# Patient Record
Sex: MALE | Race: WHITE | NOT HISPANIC OR LATINO | Employment: OTHER | URBAN - NONMETROPOLITAN AREA
[De-identification: names, ages, dates, MRNs, and addresses within clinical notes are randomized per-mention and may not be internally consistent; named-entity substitution may affect disease eponyms.]

---

## 2018-05-30 RX ORDER — NITROGLYCERIN 0.4 MG/1
0.4 TABLET SUBLINGUAL
COMMUNITY
End: 2019-09-11 | Stop reason: SDUPTHER

## 2018-05-30 RX ORDER — ATORVASTATIN CALCIUM 80 MG/1
80 TABLET, FILM COATED ORAL DAILY
COMMUNITY
End: 2018-05-31 | Stop reason: SDUPTHER

## 2018-05-30 RX ORDER — DILTIAZEM HYDROCHLORIDE 180 MG/1
180 CAPSULE, COATED, EXTENDED RELEASE ORAL DAILY
COMMUNITY
End: 2019-09-11

## 2018-05-30 RX ORDER — ASPIRIN 81 MG/1
81 TABLET ORAL DAILY
COMMUNITY

## 2018-05-31 ENCOUNTER — OFFICE VISIT (OUTPATIENT)
Dept: CARDIOLOGY | Facility: CLINIC | Age: 73
End: 2018-05-31

## 2018-05-31 VITALS
OXYGEN SATURATION: 97 % | WEIGHT: 184 LBS | DIASTOLIC BLOOD PRESSURE: 40 MMHG | HEART RATE: 75 BPM | SYSTOLIC BLOOD PRESSURE: 90 MMHG

## 2018-05-31 DIAGNOSIS — I25.10 CORONARY ARTERY DISEASE INVOLVING NATIVE CORONARY ARTERY OF NATIVE HEART WITHOUT ANGINA PECTORIS: Primary | ICD-10-CM

## 2018-05-31 DIAGNOSIS — G47.33 OBSTRUCTIVE SLEEP APNEA: ICD-10-CM

## 2018-05-31 DIAGNOSIS — E78.5 DYSLIPIDEMIA: ICD-10-CM

## 2018-05-31 DIAGNOSIS — I10 ESSENTIAL HYPERTENSION: ICD-10-CM

## 2018-05-31 PROCEDURE — 99204 OFFICE O/P NEW MOD 45 MIN: CPT | Performed by: INTERNAL MEDICINE

## 2018-05-31 PROCEDURE — 93000 ELECTROCARDIOGRAM COMPLETE: CPT | Performed by: INTERNAL MEDICINE

## 2018-05-31 RX ORDER — ATORVASTATIN CALCIUM 80 MG/1
80 TABLET, FILM COATED ORAL DAILY
Qty: 90 TABLET | Refills: 11 | Status: SHIPPED | OUTPATIENT
Start: 2018-05-31 | End: 2019-08-26 | Stop reason: SDUPTHER

## 2018-05-31 NOTE — PROGRESS NOTES
Reason for Visit: Establish cardiovascular care.    HPI:  Erasmo Martinez is a 73 y.o. male is being seen for consultation today at the request of Dr. Edwards.  His main complaint recently is fatigue and not having any energy.  Occasionally has some dizziness.   Symptoms has been going on for years and did not improving following CABG.  Also was diagnosed with sleep apnea in March.  He was set up for CPAP.  He uses it about 4-5 hours tonight.  He was told he needed to use the higher pressure setting but was unable to tolerate it.  Has not had any recent heart complaints.  He denies any recent chest pain, palpitations, dizzines, syncope, PND, or orthopnea.     Previous Cardiac Testing and Procedures:  - LHC (10/03/2016) severe subtotal occlusion of mid LAD, 80% proximal diagonal, 40-50% mid LCx, 80% proximal RCA  - Echo (11/13/2017) EF 72%, mild TR and MR, aortic valve sclerosis with trace AI  - Lipid panel (11/13/2017) total cholesterol 143, HDL 40, LDL 86, triglycerides 86  - Nuclear stress (01/09/2018) anterior apical perfusion defect consistent with transmural myocardial infarction with ischemia, lateral perfusion defect consistent with ischemia, EF 70-75%  - Sleep study (03/08/2018) severe obstructive sleep apnea    Patient Active Problem List   Diagnosis   • Obstructive sleep apnea   • Coronary artery disease involving native coronary artery of native heart without angina pectoris   • Dyslipidemia   • Essential hypertension       Social History   Substance Use Topics   • Smoking status: Never Smoker   • Smokeless tobacco: Never Used   • Alcohol use Not on file      Comment: very rare       Family History   Problem Relation Age of Onset   • No Known Problems Mother    • Heart disease Father        The following portions of the patient's history were reviewed and updated as appropriate: allergies, current medications, past family history, past medical history, past social history, past surgical history and problem  list.      Current Outpatient Prescriptions:   •  aspirin 81 MG EC tablet, Take 81 mg by mouth Daily., Disp: , Rfl:   •  atorvastatin (LIPITOR) 80 MG tablet, Take 1 tablet by mouth Daily., Disp: 90 tablet, Rfl: 11  •  diltiaZEM CD (CARDIZEM CD) 180 MG 24 hr capsule, Take 180 mg by mouth Daily., Disp: , Rfl:   •  nitroglycerin (NITROSTAT) 0.4 MG SL tablet, Place 0.4 mg under the tongue Every 5 (Five) Minutes As Needed for Chest Pain. Take no more than 3 doses in 15 minutes., Disp: , Rfl:     Review of Systems   Constitution: Positive for malaise/fatigue. Negative for chills, decreased appetite, fever and weakness.   HENT: Negative for congestion and nosebleeds.    Eyes: Negative for blurred vision and double vision.   Cardiovascular: Positive for near-syncope. Negative for chest pain, irregular heartbeat, leg swelling and palpitations.   Respiratory: Negative for cough and shortness of breath.    Endocrine: Positive for heat intolerance. Negative for cold intolerance.   Hematologic/Lymphatic: Does not bruise/bleed easily.   Skin: Negative for dry skin, itching and rash.   Musculoskeletal: Positive for back pain, joint pain and muscle cramps. Negative for neck pain.   Gastrointestinal: Negative for abdominal pain, constipation, diarrhea, heartburn and melena.   Genitourinary: Positive for nocturia. Negative for dysuria, frequency and hematuria.   Neurological: Positive for dizziness. Negative for headaches, light-headedness and loss of balance.   Psychiatric/Behavioral: Positive for memory loss. Negative for depression. The patient does not have insomnia and is not nervous/anxious.        Objective   BP 90/40 (BP Location: Left arm, Patient Position: Sitting, Cuff Size: Adult)   Pulse 75   Wt 83.5 kg (184 lb)   SpO2 97%   Physical Exam   Constitutional: He is oriented to person, place, and time. He appears well-developed and well-nourished.   HENT:   Head: Normocephalic and atraumatic.   Eyes: Conjunctivae and EOM  are normal. Pupils are equal, round, and reactive to light.   Neck: Normal range of motion. Neck supple. No JVD present. No thyromegaly present.   Cardiovascular: Normal rate, regular rhythm and normal heart sounds.    No murmur heard.  Pulmonary/Chest: Effort normal and breath sounds normal. He has no wheezes. He has no rales.   Abdominal: Soft. Bowel sounds are normal. He exhibits no distension. There is no tenderness.   Musculoskeletal: Normal range of motion. He exhibits no edema.   Neurological: He is alert and oriented to person, place, and time. Coordination normal.   Skin: Skin is warm and dry. No rash noted.   Psychiatric: He has a normal mood and affect. His behavior is normal.       ECG 12 Lead  Date/Time: 5/31/2018 3:46 PM  Performed by: APARNA DAUGHERTY  Authorized by: APARNA DAUGHERTY   Previous ECG: no previous ECG available  Rhythm: sinus rhythm  Rate: normal  Conduction: incomplete RBBB              ICD-10-CM ICD-9-CM   1. Coronary artery disease involving native coronary artery of native heart without angina pectoris I25.10 414.01   2. Dyslipidemia E78.5 272.4   3. Essential hypertension I10 401.9   4. Obstructive sleep apnea G47.33 327.23         Assessment/Plan:  1. Coronary artery disease: History of CABG in 2016.  Will attempt to obtain copy of CABG report.  Has an abnormal stress test from January.  Currently is asymptomatic with reasonable functional capacity.  Continue medical therapy with aspirin, atorvastatin, and diltiazem.    2.  Dyslipidemia: Managed on atorvastatin.  Told by his previous cardiologist needs a lipid panel and liver function test.  Will order along with refill of atorvastatin.    3.  Essential hypertension: Blood pressure is borderline low today.  Encourage him to stay well-hydrated.  If remains low we will consider discontinuation of diltiazem.    4.  Obstructive sleep apnea: Recent sleep study from March demonstrates severe obstructive sleep apnea.  Tolerating minimal  settings on CPAP for part of the night.  Encourage him to use it as much as possible at the maximally tolerated settings.  Inadequately treated sleep apnea may be contributing to his chronic fatigue.

## 2018-07-06 ENCOUNTER — TELEPHONE (OUTPATIENT)
Dept: CARDIOLOGY | Facility: CLINIC | Age: 73
End: 2018-07-06

## 2018-07-06 NOTE — TELEPHONE ENCOUNTER
Please let him know that his hepatic function panel is within normal limits and his lipid panel demonstrates good control.

## 2018-07-06 NOTE — TELEPHONE ENCOUNTER
Tried calling patient's wife back at the number she requested, no answer. Left message for her to return my call for results.

## 2018-07-06 NOTE — TELEPHONE ENCOUNTER
PATIENT'S WIFE CALLED REQUESTING LAB RESULTS. DOES NOT LOOK LIKE RESULTS WERE ROUTED TO YOU TO REVIEW. WILL YOU PLEASE REVIEW HEPATIC/LIPID RESULTS?    THANKS

## 2018-08-14 ENCOUNTER — OFFICE VISIT (OUTPATIENT)
Dept: NEUROLOGY | Age: 73
End: 2018-08-14
Payer: MEDICARE

## 2018-08-14 ENCOUNTER — TELEPHONE (OUTPATIENT)
Dept: NEUROLOGY | Age: 73
End: 2018-08-14

## 2018-08-14 VITALS
DIASTOLIC BLOOD PRESSURE: 67 MMHG | SYSTOLIC BLOOD PRESSURE: 110 MMHG | HEIGHT: 67 IN | WEIGHT: 182.4 LBS | HEART RATE: 67 BPM | BODY MASS INDEX: 28.63 KG/M2

## 2018-08-14 DIAGNOSIS — R41.3 MEMORY LOSS: ICD-10-CM

## 2018-08-14 DIAGNOSIS — G45.4 TRANSIENT GLOBAL AMNESIA: ICD-10-CM

## 2018-08-14 DIAGNOSIS — G47.52 REM SLEEP BEHAVIOR DISORDER: ICD-10-CM

## 2018-08-14 DIAGNOSIS — G47.33 SLEEP APNEA, OBSTRUCTIVE: Primary | ICD-10-CM

## 2018-08-14 LAB
FOLATE: 12.8 NG/ML (ref 4.5–32.2)
TSH SERPL DL<=0.05 MIU/L-ACNC: 2.15 UIU/ML (ref 0.27–4.2)
VITAMIN B-12: 508 PG/ML (ref 211–946)

## 2018-08-14 PROCEDURE — 99204 OFFICE O/P NEW MOD 45 MIN: CPT | Performed by: PSYCHIATRY & NEUROLOGY

## 2018-08-14 RX ORDER — NITROGLYCERIN 0.4 MG/1
0.4 TABLET SUBLINGUAL PRN
COMMUNITY
End: 2020-01-07 | Stop reason: SDUPTHER

## 2018-08-14 RX ORDER — ATORVASTATIN CALCIUM 80 MG/1
80 TABLET, FILM COATED ORAL NIGHTLY
COMMUNITY
Start: 2018-05-31 | End: 2018-08-14

## 2018-08-14 RX ORDER — MEMANTINE HYDROCHLORIDE 10 MG/1
10 TABLET ORAL DAILY
Qty: 60 TABLET | Refills: 5 | Status: SHIPPED | OUTPATIENT
Start: 2018-08-14 | End: 2019-06-24 | Stop reason: SDUPTHER

## 2018-08-14 RX ORDER — ATORVASTATIN CALCIUM 80 MG/1
80 TABLET, FILM COATED ORAL NIGHTLY
Refills: 11 | COMMUNITY
Start: 2018-07-19 | End: 2018-08-14

## 2018-08-14 RX ORDER — MEMANTINE HYDROCHLORIDE 5 MG/1
5 TABLET ORAL DAILY
Qty: 60 TABLET | Refills: 0 | Status: SHIPPED | OUTPATIENT
Start: 2018-08-14 | End: 2019-06-24

## 2018-08-14 RX ORDER — LATANOPROST 50 UG/ML
1 SOLUTION/ DROPS OPHTHALMIC NIGHTLY
Refills: 6 | COMMUNITY
Start: 2018-07-19

## 2018-08-14 RX ORDER — ASPIRIN 81 MG/1
81 TABLET ORAL NIGHTLY
COMMUNITY

## 2018-08-14 RX ORDER — DILTIAZEM HYDROCHLORIDE 180 MG/1
180 CAPSULE, COATED, EXTENDED RELEASE ORAL NIGHTLY
COMMUNITY
End: 2020-01-07

## 2018-08-14 RX ORDER — CLONAZEPAM 0.5 MG/1
0.5 TABLET ORAL NIGHTLY
Refills: 0 | COMMUNITY
Start: 2018-07-26 | End: 2020-01-07

## 2018-08-14 ASSESSMENT — ENCOUNTER SYMPTOMS
VOMITING: 0
CONSTIPATION: 1
BLOOD IN STOOL: 0
COUGH: 0
HEMOPTYSIS: 0
ABDOMINAL PAIN: 0
DOUBLE VISION: 0
SHORTNESS OF BREATH: 0
BACK PAIN: 0
SPUTUM PRODUCTION: 0
BLURRED VISION: 0
NAUSEA: 0
DIARRHEA: 0

## 2018-08-14 NOTE — LETTER
[facility]  [address] [address]  [place], [address]  Phone [phone]  Fax [phone]     Patient Name: Ivory Haile 2018  : 1945  Age: 75 y.o.   Patient Address: [address] [place] [address] [address] [address]       Patient Phone: [phone] (home)     REFERRAL  Referred to: DME provider of patient's choice  Ivory Haile is referred for the following:        Replinishible PAP Supplies, 1 year supply  Item HPCPS Code Frequency   Mask of choice  or  1 per 3 months   Nasal Mask cushion/pillows  or  2 per 30 days   Full Face Mask Interface  1 per 30 days   Headgear  1 per 6 months   Tubing, length of choice  or  1 per 3 months   Water Chamber  1 per 6 months   Chinstrap  1 per 6 months   Disposable Filters  2 per 30 days   Reusable Filters  1 per 6 months     Diagnoses:  Obstructive sleep apnea (G47.33)  Length of Need: Lifetime, 99    Ordering Provider: STAN Reyes Kasia: [id number]         Signature:       Date: 2018      Electronically Signed by Ciera Lopez M.D.

## 2018-08-14 NOTE — PROGRESS NOTES
[facility]  [address] [place] [address] [address]  [address]  Phone [phone]  Fax [phone]     Giovani Alegria PATIENT VISIT        Patient: Velasquez yCr  :  1945  Age:  75 y.o. MRN:  [id number]  Account #:  [de-identified]  Today:  18    Referring Provider:  Natalee Baker M.D. Consulting Provider: Mary Sanabria M.D.    [address] [place] [address]:  Chief Complaint   Patient presents with   Orlene Gala New Patient     feels tired all the time       History Source: History obtained from chart review and the patient. PCP: Natalee Baker    HISTORY OF PRESENT ILLNESS:   Velasquez Cyr is a 75y.o. year old man with a history of obstructive sleep apnea who was referred for continued care. He had a sleep study in [place] 3/2018 that showed ANGE with an AHI of 47. He was prescribed CPAP at 8cm. He is having problems using the device. He uses a full face mask and it makes noises. He ends up taking the mask off. He sleeps OK but is fatigued in the day. He gets to sleep quickly. He has active sleep and fights, hits, and kicks in his sleep. He has had this since about . 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 He has had several episodes of transient amnesia and has had worsening memory loss. He did see a neurologist in [place] and had a negative work up his wife says. He has had worsening memory for many years apparently. He and his wife have been  for 6 years and she relates that she founds letters or a diary he wrote 10 years ago complaining of memory loss.     PAST MEDICAL HITORY:    Past Medical History:   Diagnosis Date    Arthritis     Cardiovascular disease     Glaucoma     pre glaucoma    Hyperlipidemia     Hypertension     Memory disorder     Obstructive sleep apnea     Polycythemia        Past Surgical History:   Procedure Laterality Date    CORONARY ARTERY BYPASS GRAFT      HERNIA REPAIR      HIP ARTHROPLASTY Right        Recent

## 2018-08-14 NOTE — TELEPHONE ENCOUNTER
Patients wife called and wanted to know if the patient needed to atorvastatin, it was discounted and on the paperwork that the patient left the office with to quit taking it. I asked [facility], she stated that it was on there twice and met to take one off not both. I called the patients wife back and left a message that the patient is suppose to be taking that, it was our fault that since there was two listed. That patient needs to continue that medication and start the nameda.

## 2018-08-16 ENCOUNTER — TELEPHONE (OUTPATIENT)
Dept: NEUROLOGY | Age: 73
End: 2018-08-16

## 2018-09-07 ENCOUNTER — HOSPITAL ENCOUNTER (OUTPATIENT)
Dept: NEUROLOGY | Age: 73
Discharge: HOME | End: 2018-09-07
Payer: MEDICARE

## 2018-09-07 ENCOUNTER — HOSPITAL ENCOUNTER (OUTPATIENT)
Dept: MRI IMAGING | Age: 73
Discharge: HOME | End: 2018-09-07
Payer: MEDICARE

## 2018-09-07 DIAGNOSIS — G45.4 TRANSIENT GLOBAL AMNESIA: ICD-10-CM

## 2018-09-07 DIAGNOSIS — R41.3 MEMORY LOSS: ICD-10-CM

## 2018-09-07 PROCEDURE — 70551 MRI BRAIN STEM W/O DYE: CPT

## 2018-09-07 PROCEDURE — 95819 EEG AWAKE AND ASLEEP: CPT | Performed by: PSYCHIATRY & NEUROLOGY

## 2018-09-07 PROCEDURE — 95812 EEG 41-60 MINUTES: CPT

## 2018-09-10 ENCOUNTER — TELEPHONE (OUTPATIENT)
Dept: NEUROLOGY | Age: 73
End: 2018-09-10

## 2018-09-26 ENCOUNTER — TELEPHONE (OUTPATIENT)
Dept: NEUROLOGY | Age: 73
End: 2018-09-26

## 2018-11-12 ENCOUNTER — OFFICE VISIT (OUTPATIENT)
Dept: NEUROLOGY | Age: 73
End: 2018-11-12
Payer: MEDICARE

## 2018-11-12 VITALS
SYSTOLIC BLOOD PRESSURE: 100 MMHG | WEIGHT: 192.2 LBS | BODY MASS INDEX: 29.13 KG/M2 | RESPIRATION RATE: 18 BRPM | HEART RATE: 60 BPM | HEIGHT: 68 IN | DIASTOLIC BLOOD PRESSURE: 62 MMHG

## 2018-11-12 DIAGNOSIS — G47.33 SLEEP APNEA, OBSTRUCTIVE: ICD-10-CM

## 2018-11-12 DIAGNOSIS — G47.52 REM SLEEP BEHAVIOR DISORDER: ICD-10-CM

## 2018-11-12 DIAGNOSIS — G45.4 TRANSIENT GLOBAL AMNESIA: ICD-10-CM

## 2018-11-12 DIAGNOSIS — R41.3 MEMORY LOSS: Primary | ICD-10-CM

## 2018-11-12 PROCEDURE — G8484 FLU IMMUNIZE NO ADMIN: HCPCS | Performed by: PSYCHIATRY & NEUROLOGY

## 2018-11-12 PROCEDURE — G8427 DOCREV CUR MEDS BY ELIG CLIN: HCPCS | Performed by: PSYCHIATRY & NEUROLOGY

## 2018-11-12 PROCEDURE — 1036F TOBACCO NON-USER: CPT | Performed by: PSYCHIATRY & NEUROLOGY

## 2018-11-12 PROCEDURE — 1123F ACP DISCUSS/DSCN MKR DOCD: CPT | Performed by: PSYCHIATRY & NEUROLOGY

## 2018-11-12 PROCEDURE — 4040F PNEUMOC VAC/ADMIN/RCVD: CPT | Performed by: PSYCHIATRY & NEUROLOGY

## 2018-11-12 PROCEDURE — G8419 CALC BMI OUT NRM PARAM NOF/U: HCPCS | Performed by: PSYCHIATRY & NEUROLOGY

## 2018-11-12 PROCEDURE — 3017F COLORECTAL CA SCREEN DOC REV: CPT | Performed by: PSYCHIATRY & NEUROLOGY

## 2018-11-12 PROCEDURE — 1101F PT FALLS ASSESS-DOCD LE1/YR: CPT | Performed by: PSYCHIATRY & NEUROLOGY

## 2018-11-12 PROCEDURE — 99213 OFFICE O/P EST LOW 20 MIN: CPT | Performed by: PSYCHIATRY & NEUROLOGY

## 2018-11-12 RX ORDER — ATORVASTATIN CALCIUM 80 MG/1
80 TABLET, FILM COATED ORAL
COMMUNITY
Start: 2018-05-31 | End: 2020-01-07

## 2018-11-12 NOTE — PROGRESS NOTES
3  Heart[de-identified]  regular rate and rhythm, S1, S2 normal, no murmur, click, rub or gallop  Lungs:  clear to auscultation bilaterally  Extremities:  extremities normal, atraumatic, no cyanosis or edema  Neurologic:  Extraocular movements are intact without nystagmus. He has reduced eye blink rate. Visual fields are full to confrontation. Facial movements are symmetrical and mildly hypomymic. Speech is precise. Extremity strength is normal in both uppers and lowers. Deep tendon reflexes are intact and symmetrical.  Rapid alternating movements are unimpaired. Finger-to-nose testing is performed well, without dysmetria. Gait is normal.    Pertinent Diagnostic Studies:  Narrative   MRI BRAIN WO CONTRAST 9/7/2018 8:37 AM   HISTORY: R41.3   Comparison: None.     Technique: Multiplanar imaging of the brain was performed in a routine   fashion without intravenous contrast.   Findings: There is no diffusion signal abnormality to suggest acute infarct. There appears to be mild generalized white matter volume loss in the   cerebral hemispheres. There does not appear to be significant small   vessel ischemic change or evidence of old cortical infarct. No lacunar   infarcts identified. Bilateral symmetric T2 hyperintensity in the   globus pallidus (series 5-image 13). No other areas of signal   abnormality identified. There is no intra-axial or extra-axial   hemorrhage. No visualized mass lesion on this noncontrast exam. Normal   size and configuration of the ventricular system for patient age. The   posterior fossa structures are unremarkable. There does not appear to   be obvious volume loss in the brainstem or cerebellar hemispheres. Incidentally noted small hippocampal cysts. The pituitary gland and   sella are unremarkable. The major intracranial flow voids are   preserved. The orbits are unremarkable. The paranasal sinuses and mastoids are   clear.  Marrow signal in the calvarium and skull base appears normal.

## 2019-06-24 ENCOUNTER — OFFICE VISIT (OUTPATIENT)
Dept: NEUROLOGY | Age: 74
End: 2019-06-24
Payer: MEDICARE

## 2019-06-24 VITALS
DIASTOLIC BLOOD PRESSURE: 64 MMHG | WEIGHT: 194 LBS | HEART RATE: 64 BPM | HEIGHT: 67 IN | BODY MASS INDEX: 30.45 KG/M2 | SYSTOLIC BLOOD PRESSURE: 108 MMHG | OXYGEN SATURATION: 96 %

## 2019-06-24 DIAGNOSIS — G47.52 REM SLEEP BEHAVIOR DISORDER: ICD-10-CM

## 2019-06-24 DIAGNOSIS — G45.4 TRANSIENT GLOBAL AMNESIA: ICD-10-CM

## 2019-06-24 DIAGNOSIS — R41.3 MEMORY LOSS: ICD-10-CM

## 2019-06-24 DIAGNOSIS — R53.83 FATIGUE, UNSPECIFIED TYPE: ICD-10-CM

## 2019-06-24 DIAGNOSIS — G47.33 OBSTRUCTIVE SLEEP APNEA: Primary | ICD-10-CM

## 2019-06-24 DIAGNOSIS — Z99.89 CPAP (CONTINUOUS POSITIVE AIRWAY PRESSURE) DEPENDENCE: ICD-10-CM

## 2019-06-24 PROCEDURE — 1036F TOBACCO NON-USER: CPT | Performed by: PHYSICIAN ASSISTANT

## 2019-06-24 PROCEDURE — G8427 DOCREV CUR MEDS BY ELIG CLIN: HCPCS | Performed by: PHYSICIAN ASSISTANT

## 2019-06-24 PROCEDURE — 4040F PNEUMOC VAC/ADMIN/RCVD: CPT | Performed by: PHYSICIAN ASSISTANT

## 2019-06-24 PROCEDURE — 3017F COLORECTAL CA SCREEN DOC REV: CPT | Performed by: PHYSICIAN ASSISTANT

## 2019-06-24 PROCEDURE — 99214 OFFICE O/P EST MOD 30 MIN: CPT | Performed by: PHYSICIAN ASSISTANT

## 2019-06-24 PROCEDURE — 1123F ACP DISCUSS/DSCN MKR DOCD: CPT | Performed by: PHYSICIAN ASSISTANT

## 2019-06-24 PROCEDURE — G8417 CALC BMI ABV UP PARAM F/U: HCPCS | Performed by: PHYSICIAN ASSISTANT

## 2019-06-24 RX ORDER — MEMANTINE HYDROCHLORIDE 10 MG/1
10 TABLET ORAL 2 TIMES DAILY
Qty: 60 TABLET | Refills: 3 | Status: SHIPPED | OUTPATIENT
Start: 2019-06-24 | End: 2020-07-21

## 2019-06-24 NOTE — PATIENT INSTRUCTIONS
Instructions:  1. Call our office in about 1 week to see if I received the lab and MRI report  2. 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 Increase memantine 10 mg to twice a day  4. Please obtain a compliance report   5. I ordered a repeat sleep study, the sleep lab will mail you a packet and call you with an appointment      Patient education: Sleep apnea in adults       INTRODUCTION -- Normally during sleep, air moves through the throat and in and out of the lungs at a regular rhythm. In a person with sleep apnea, air movement is periodically diminished or stopped. There are two types of sleep apnea: obstructive sleep apnea and central sleep apnea. In obstructive sleep apnea, breathing is abnormal because of narrowing or closure of the throat. In central sleep apnea, breathing is abnormal because of a change in the breathing control and rhythm. Sleep apnea is a serious condition that can affect a person's ability to safely perform normal daily activities and can affect long term health. Approximately 25 percent of adults are at risk for sleep apnea of some degree. Men are more commonly affected than women. Other risk factors include middle and older age, being overweight or obese, and having a small mouth and throat. This topic review focuses on the most common type of sleep apnea in adults, obstructive sleep apnea (AGNE). HOW SLEEP APNEA OCCURS -- The throat is surrounded by muscles that control the airway for speaking, swallowing, and breathing. During sleep, these muscles are less active, and this causes the throat to narrow. In most people, this narrowing does not affect breathing. In others, it can cause snoring, sometimes with reduced or completely blocked airflow. A completely blocked airway without airflow is called an obstructive apnea. Partial obstruction with diminished airflow is called a hypopnea.  A person may have apnea and hypopnea during sleep. Insufficient breathing due to apnea or hypopnea causes oxygen levels to fall and carbon dioxide to rise. Because the airway is blocked, breathing faster or harder does not help to improve oxygen levels until the airway is reopened. Typically, the obstruction requires the person to awaken to activate the upper airway muscles. Once the airway is opened, the person then takes several deep breaths to catch up on breathing. As the person awakens, he or she may move briefly, snort or snore, and take a deep breath. Less frequently, a person may awaken completely with a sensation of gasping, smothering, or choking. If the person falls back to sleep quickly, he or she will not remember the event. Many people with sleep apnea are unaware of their abnormal breathing in sleep, and all patients underestimate how often their sleep is interrupted. Awakening from sleep causes sleep to be unrefreshing and causes fatigue and daytime sleepiness. Anatomic causes of obstructive sleep apnea --  Most patients have ANGE because of a small upper airway. As the bones of the face and skull develop, some people develop a small lower face, a small mouth, and a tongue that seems too large for the mouth. These features are genetically determined, which explains why ANGE tends to cluster in families. Obesity is another major factor. Tonsil enlargement can be an important cause, especially in children. SLEEP APNEA SYMPTOMS -- The main symptoms of ANGE are loud snoring, fatigue, and daytime sleepiness. However, some people have no symptoms. For example, if the person does not have a bed partner, he or she may not be aware of the snoring. Fatigue and sleepiness have many causes and are often attributed to overwork and increasing age. As a result, a person may be slow to recognize that they have a problem. A bed partner or spouse often prompts the patient to seek medical care.   Other symptoms may include one or more of the following:  ?Restless sleep  ? Awakening with choking, gasping, or smothering  ? Morning headaches, dry mouth, or sore throat  ? Waking frequently to urinate  ? Awakening unrested, groggy  ? Low energy, difficulty concentrating, memory impairment    Risk factors -- Certain factors increase the risk of sleep apnea. ?Increasing age - ANGE occurs at all ages, but it is more common in middle and older age adults. ?Male sex - ANGE is two times more common in men, especially in middle age. ?Obesity - The more obese a person is, the more likely he or she is to have ANGE. ? Sedation from medication or alcohol - This interferes with the ability to awaken from sleep and can lengthen periods of apnea (no breathing), with potentially dangerous consequences. ? Abnormality of the airway. SLEEP APNEA CONSEQUENCES -- Complications of sleep apnea can include daytime sleepiness and difficulty concentrating. The consequence of this is an increased risk of accidents and errors in daily activities. Studies have shown that people with severe ANGE are more than twice as likely to be involved in a motor vehicle accident as people without these conditions. People with ANGE are encouraged to discuss options for driving, working, and performing other high-risk tasks with a healthcare provider. In addition, people with untreated ANGE may have an increased risk of cardiovascular problems such as high blood pressure, heart attack, abnormal heart rhythms, or stroke. This risk may be due to changes in the heart rate and blood pressure that occur during sleep. SLEEP APNEA DIAGNOSIS -- The diagnosis of ANGE is best made by a knowledgeable sleep medicine specialist who has an understanding of the individual's health issues. The diagnosis is usually based upon the person's medical history, physical examination, and testing, including:  ? A complaint of snoring and ineffective sleep  ? Neck size (greater than 16 inches in men or 14 inches in women) is CPAP should be used any time the person sleeps (day or night). The CPAP device is usually used for the first time in the sleep lab, where a technician can adjust the pressure and select the best equipment to keep the airway open. Alternatively, an auto device with a self-adjusting pressure feature, provided with proper education and training, can get treatment started without another sleep test. While the treatment may seem uncomfortable, noisy, or bulky at first, most people accept the treatment after experiencing better sleep. However, difficulty with mask comfort and nasal congestion prevent up to 50 percent of people from using the treatment on a regular basis. Continued follow up with a healthcare provider helps to ensure that the treatment is effective and comfortable. Information from the CPAP machine is often used by physicians, therapists, and insurers to track the success of treatment. CPAP can be delivered with different features to improve comfort and solve problems that may come up during treatment. Changes in treatment may be needed if symptoms do not improve or if the persons condition changes, such as a gain or loss of weight. Adjust sleep position -- Adjusting sleep position (to stay off the back) may help improve sleep quality in people who have ANGE when sleeping on the back. However, this is difficult to maintain throughout the night and is rarely an adequate solution. Weight loss -- Weight loss may be helpful for obese or overweight patients. Weight loss may be accomplished with dietary changes, exercise, and/or surgical treatment. However, it can be difficult to maintain weight loss; the five-year success of non-surgical weight loss is only 5 percent, meaning that 95 percent of people regain lost weight. Avoid alcohol and other sedatives -- Alcohol can worsen sleepiness, potentially increasing the risk of accidents or injury.  People with ANGE are often counseled to drink little to no alcohol, even during the daytime. Similarly, people who take anti-anxiety medications or sedatives to sleep should speak with their healthcare provider about the safety of these medications. People with ANGE must notify all healthcare providers, including surgeons, about their condition and the potential risks of being sedated. People with ANGE who are given anesthesia and/or pain medications require special management and close monitoring to reduce the risk of a blocked airway. Dental devices -- A dental device, called an oral appliance or mandibular advancement device, can reposition the jaw (mandible), bringing the tongue and soft palate forward as well. This may relieve obstruction in some people. This treatment is excellent for reducing snoring, although the effect on ANGE is sometimes more limited. As a result, dental devices are best used for mild cases of ANGE when relief of snoring is the main goal. Failure to tolerate and accept CPAP is another indication for dental devices. While dental devices are not as effective as CPAP for ANGE, some patients prefer a dental device to CPAP. Side effects of dental devices are generally minor but may include changes to the bite with prolonged use. Surgical treatment -- Surgery is an alternative therapy for patients who cannot tolerate or do not improve with nonsurgical treatments such as CPAP or oral devices. Surgery can also be used in combination with other nonsurgical treatments. Surgical procedures reshape structures in the upper airways or surgically reposition bone or soft tissue. Uvulopalatopharyngoplasty (UPPP) removes the uvula and excessive tissue in the throat, including the tonsils, if present. Other procedures, such as maxillomandibular advancement (MMA), address both the upper and lower pharyngeal airway more globally. UPPP alone has limited success rates (less than 50 percent) and people can relapse (when ANGE symptoms return after surgery).  As a result, this surgery is only recommended in a minority of people and should be considered with caution. MMA may have a higher success rate, particularly in people with abnormal jaw (maxilla and mandible) anatomy, but it is the most complicated procedure. A newer surgical approach, nerve stimulation to protrude the tongue, has promising success rates in very selected people. Tracheostomy creates a permanent opening in the neck. It is reserved for people with severe disease in whom less drastic measures have failed or are inappropriate. Although it is always successful in eliminating obstructive sleep apnea, tracheostomy requires significant lifestyle changes and carries some serious risks (eg, infection, bleeding, blockage). All surgical treatments require discussions about the goals of treatment, the expected outcomes, and potential complications. Hypoglossal nerve stimulator- \"Inspire\" device    WHERE TO GET MORE INFORMATION -- Your healthcare provider is the best source of information for questions and concerns related to your medical problem. Organizations  American Sleep Apnea Association  Provides information about sleep apnea to the public, publishes a newsletter, and serves as an advocate for people with the disorder. [place], [address]   [email or web address] org   [employer].. org   Tel: [phone]   Fax: [facility] organization that works to [employer] and safety by promoting public understanding of sleep and sleep disorders. Supports sleep-related education, research, and advocacy; produces and distributes educational materials to the public and healthcare professionals; and offers postdoctoral fellowships and grants for sleep researchers. Marino [email or web address]. org   [email or web address]. org   Tel: [phone]   Fax: [phone]    Important information:  Medicare/private insurance CPAP/BiPAP/APAP requirements:  Medicare/private insurance has specific requirements for PAP compliance that must be met during the first 90 days of use to continue coverage for CPAP/BiPAP/APAP  from day 91 and beyond. The policy requires that patients use a PAP device 4 hours per 24 hour period, at least 70% of the time over a 30 day period. This data must be downloaded as a report direct from the PAP devices. This is called a compliance download. Your PAP supplier will assist you in this matter. Reminder:  Please bring a copy of the compliance download to your next office visit or have your supplier fax it to our office prior to your office visit. Note:  Where applicable, we will utilize PAP device efficiency reports, additional testing, and face-to-face  clinical evaluation subsequent to any treatment, changes in treatment, and continued treatment. Caution:  Please abstain from driving or engaging in other activities which may be hazardous in the presence of diminished alertness or daytime drowsiness. And avoid the use of sedatives or alcohol, which can worsen sleep apnea and daytime drowsiness. Mask suggestions:  - Resmed Airfit N20 (Nasal) or F20 (Full face mask). They conform to your face, thus decreasing the potential for mask leakage. You might like the [employer] (full face mask). It has a \"memory foam\" like cushion. The AirFit F30 is a smaller style full face mask designed to sit low on and cover less of your face for fewer facial marks. Respironics: You might also like to try a nasal mask called a Dreamwear nasal mask or the Dreamwear nasal pillow. Another suggestion is the [facility], it is a minimal contact full face mask. The Christine Lovings incredible under the nose design makes it the only full face mask that won't cause red marks on the bridge of your nose when compared to other full face masks.  The Dreamwear
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
## 2019-08-26 RX ORDER — ATORVASTATIN CALCIUM 80 MG/1
80 TABLET, FILM COATED ORAL DAILY
Qty: 90 TABLET | Refills: 0 | Status: SHIPPED | OUTPATIENT
Start: 2019-08-26 | End: 2019-09-11 | Stop reason: SDUPTHER

## 2019-09-11 ENCOUNTER — OFFICE VISIT (OUTPATIENT)
Dept: CARDIOLOGY | Facility: CLINIC | Age: 74
End: 2019-09-11

## 2019-09-11 VITALS
BODY MASS INDEX: 30.13 KG/M2 | SYSTOLIC BLOOD PRESSURE: 106 MMHG | OXYGEN SATURATION: 94 % | DIASTOLIC BLOOD PRESSURE: 60 MMHG | WEIGHT: 192 LBS | HEART RATE: 69 BPM | HEIGHT: 67 IN

## 2019-09-11 DIAGNOSIS — G47.33 OBSTRUCTIVE SLEEP APNEA: ICD-10-CM

## 2019-09-11 DIAGNOSIS — E78.5 DYSLIPIDEMIA: ICD-10-CM

## 2019-09-11 DIAGNOSIS — I10 ESSENTIAL HYPERTENSION: ICD-10-CM

## 2019-09-11 DIAGNOSIS — R53.83 FATIGUE, UNSPECIFIED TYPE: ICD-10-CM

## 2019-09-11 DIAGNOSIS — I25.10 CORONARY ARTERY DISEASE INVOLVING NATIVE CORONARY ARTERY OF NATIVE HEART WITHOUT ANGINA PECTORIS: Primary | ICD-10-CM

## 2019-09-11 PROCEDURE — 99214 OFFICE O/P EST MOD 30 MIN: CPT | Performed by: INTERNAL MEDICINE

## 2019-09-11 RX ORDER — LATANOPROST 50 UG/ML
1 SOLUTION/ DROPS OPHTHALMIC NIGHTLY
COMMUNITY

## 2019-09-11 RX ORDER — MEMANTINE HYDROCHLORIDE 10 MG/1
TABLET ORAL
COMMUNITY
Start: 2019-06-24

## 2019-09-11 RX ORDER — ATORVASTATIN CALCIUM 20 MG/1
20 TABLET, FILM COATED ORAL DAILY
Qty: 30 TABLET | Refills: 11 | Status: SHIPPED | OUTPATIENT
Start: 2019-09-11

## 2019-09-11 RX ORDER — NITROGLYCERIN 0.4 MG/1
0.4 TABLET SUBLINGUAL
Qty: 25 TABLET | Refills: 11 | Status: SHIPPED | OUTPATIENT
Start: 2019-09-11

## 2019-09-11 RX ORDER — PHENOL 1.4 %
AEROSOL, SPRAY (ML) MUCOUS MEMBRANE
COMMUNITY

## 2019-09-11 NOTE — PROGRESS NOTES
Reason for Visit: cardiovascular follow up.    HPI:  Erasmo Martinez is a 74 y.o. male is here today for follow-up.  He is doing well from a cardiac standpoint.  His main complaint is significant fatigue.  He feels tired much of the time.  Does not have any energy.  He was started on CPAP but family notes only minimal improvement.  He is also being worked up for memory loss.  He complains of muscle cramps.  He is previously started on diltiazem for his coronary artery disease by her prior cardiologist.  Family denies ever having blood pressure problems.  He is dizzy when he leans over.  He does not get much exercise.    Previous Cardiac Testing and Procedures:  - LHC (10/03/2016) severe subtotal occlusion of mid LAD, 80% proximal diagonal, 40-50% mid LCx, 80% proximal RCA  - Echo (11/13/2017) EF 72%, mild TR and MR, aortic valve sclerosis with trace AI  - Lipid panel (11/13/2017) total cholesterol 143, HDL 40, LDL 86, triglycerides 86  - Nuclear stress (01/09/2018) anterior apical perfusion defect consistent with transmural myocardial infarction with ischemia, lateral perfusion defect consistent with ischemia, EF 70-75%  - Sleep study (03/08/2018) severe obstructive sleep apnea  -Lipid panel (12/28/2018) total cholesterol 102, HDL 37, LDL 40, triglycerides 125    Patient Active Problem List   Diagnosis   • Obstructive sleep apnea   • Coronary artery disease involving native coronary artery of native heart without angina pectoris   • Dyslipidemia   • Essential hypertension       Social History     Tobacco Use   • Smoking status: Never Smoker   • Smokeless tobacco: Never Used   Substance Use Topics   • Alcohol use: Not on file     Comment: very rare   • Drug use: No       Family History   Problem Relation Age of Onset   • No Known Problems Mother    • Heart disease Father        The following portions of the patient's history were reviewed and updated as appropriate: allergies, current medications, past family  "history, past medical history, past social history, past surgical history and problem list.      Current Outpatient Medications:   •  aspirin 81 MG EC tablet, Take 81 mg by mouth Daily., Disp: , Rfl:   •  atorvastatin (LIPITOR) 20 MG tablet, Take 1 tablet by mouth Daily., Disp: 30 tablet, Rfl: 11  •  latanoprost (XALATAN) 0.005 % ophthalmic solution, 1 drop Every Night., Disp: , Rfl:   •  Melatonin 10 MG tablet, Take  by mouth., Disp: , Rfl:   •  memantine (NAMENDA) 10 MG tablet, memantine 10 mg tablet  Take 1 tablet every day by oral route., Disp: , Rfl:   •  nitroglycerin (NITROSTAT) 0.4 MG SL tablet, Place 1 tablet under the tongue Every 5 (Five) Minutes As Needed for Chest Pain. Take no more than 3 doses in 15 minutes., Disp: 25 tablet, Rfl: 11    Review of Systems   Constitution: Positive for weakness and malaise/fatigue. Negative for chills and fever.   Cardiovascular: Negative for chest pain and paroxysmal nocturnal dyspnea.   Respiratory: Negative for cough and shortness of breath.    Skin: Negative for rash.   Musculoskeletal: Positive for muscle cramps.   Gastrointestinal: Negative for abdominal pain and heartburn.   Neurological: Positive for dizziness. Negative for numbness.   Psychiatric/Behavioral: Positive for memory loss.       Objective   /60 (BP Location: Left arm, Patient Position: Sitting, Cuff Size: Adult)   Pulse 69   Ht 170.2 cm (67\")   Wt 87.1 kg (192 lb)   SpO2 94%   BMI 30.07 kg/m²   Physical Exam   Constitutional: He is oriented to person, place, and time. He appears well-developed and well-nourished.   HENT:   Head: Normocephalic and atraumatic.   Cardiovascular: Normal rate, regular rhythm and normal heart sounds.   No murmur heard.  Pulmonary/Chest: Effort normal and breath sounds normal.   Musculoskeletal: He exhibits no edema.   Neurological: He is alert and oriented to person, place, and time.   Skin: Skin is warm and dry.   Psychiatric: He has a normal mood and affect. "     Procedures      ICD-10-CM ICD-9-CM   1. Coronary artery disease involving native coronary artery of native heart without angina pectoris I25.10 414.01   2. Dyslipidemia E78.5 272.4   3. Essential hypertension I10 401.9   4. Obstructive sleep apnea G47.33 327.23   5. Fatigue, unspecified type R53.83 780.79         Assessment/Plan:  1. Coronary artery disease: History of CABG in 2016.    Results remain unavailable at this time.    Previous abnormal nuclear stress from 1/2018 has been managed medically due to lack of any chest pain or anginal symptoms. Continue aspirin.  Decrease atorvastatin to 20 mg.  Discontinue diltiazem.     2.  Dyslipidemia: Controlled on atorvastatin.  Given muscle aches and fatigue will decrease atorvastatin down to 20 mg.     3.  Essential hypertension: Blood pressure is borderline low today.  Given his fatigue will discontinue diltiazem.     4.  Obstructive sleep apnea: Patient is compliant with CPAP and uses it regularly.  Still has significant fatigue.    5.  Fatigue: Unclear etiology.  Diltiazem could potentially be contributing.  Will discontinue.  Also has muscle aches with atorvastatin.  Will decrease from 80 mg down to 20 mg.

## 2019-09-25 ENCOUNTER — HOSPITAL ENCOUNTER (OUTPATIENT)
Dept: SLEEP CENTER | Age: 74
Discharge: HOME | End: 2019-09-27
Payer: MEDICARE

## 2019-09-25 PROCEDURE — 95811 POLYSOM 6/>YRS CPAP 4/> PARM: CPT

## 2019-09-25 PROCEDURE — 95811 POLYSOM 6/>YRS CPAP 4/> PARM: CPT | Performed by: PSYCHIATRY & NEUROLOGY

## 2019-10-01 ENCOUNTER — OFFICE VISIT (OUTPATIENT)
Dept: NEUROLOGY | Age: 74
End: 2019-10-01
Payer: MEDICARE

## 2019-10-01 VITALS
WEIGHT: 190 LBS | HEART RATE: 73 BPM | BODY MASS INDEX: 29.82 KG/M2 | DIASTOLIC BLOOD PRESSURE: 80 MMHG | SYSTOLIC BLOOD PRESSURE: 120 MMHG | HEIGHT: 67 IN

## 2019-10-01 DIAGNOSIS — E55.9 VITAMIN D DEFICIENCY, UNSPECIFIED: ICD-10-CM

## 2019-10-01 DIAGNOSIS — R41.3 MEMORY LOSS: ICD-10-CM

## 2019-10-01 DIAGNOSIS — G47.10 HYPERSOMNOLENCE: ICD-10-CM

## 2019-10-01 DIAGNOSIS — G47.52 REM SLEEP BEHAVIOR DISORDER: ICD-10-CM

## 2019-10-01 DIAGNOSIS — R93.0 ABNORMAL MRI OF HEAD: ICD-10-CM

## 2019-10-01 DIAGNOSIS — R53.83 FATIGUE, UNSPECIFIED TYPE: ICD-10-CM

## 2019-10-01 DIAGNOSIS — G47.33 OBSTRUCTIVE SLEEP APNEA: Primary | ICD-10-CM

## 2019-10-01 LAB
T4 FREE: 1.3 NG/DL (ref 0.9–1.7)
TSH SERPL DL<=0.05 MIU/L-ACNC: 1.48 UIU/ML (ref 0.27–4.2)
VITAMIN D 25-HYDROXY: 34.5 NG/ML

## 2019-10-01 PROCEDURE — G8427 DOCREV CUR MEDS BY ELIG CLIN: HCPCS | Performed by: PSYCHIATRY & NEUROLOGY

## 2019-10-01 PROCEDURE — G8417 CALC BMI ABV UP PARAM F/U: HCPCS | Performed by: PSYCHIATRY & NEUROLOGY

## 2019-10-01 PROCEDURE — G8484 FLU IMMUNIZE NO ADMIN: HCPCS | Performed by: PSYCHIATRY & NEUROLOGY

## 2019-10-01 PROCEDURE — 1123F ACP DISCUSS/DSCN MKR DOCD: CPT | Performed by: PSYCHIATRY & NEUROLOGY

## 2019-10-01 PROCEDURE — 99214 OFFICE O/P EST MOD 30 MIN: CPT | Performed by: PSYCHIATRY & NEUROLOGY

## 2019-10-01 PROCEDURE — 4040F PNEUMOC VAC/ADMIN/RCVD: CPT | Performed by: PSYCHIATRY & NEUROLOGY

## 2019-10-01 PROCEDURE — 3017F COLORECTAL CA SCREEN DOC REV: CPT | Performed by: PSYCHIATRY & NEUROLOGY

## 2019-10-01 PROCEDURE — 1036F TOBACCO NON-USER: CPT | Performed by: PSYCHIATRY & NEUROLOGY

## 2019-10-01 RX ORDER — AMANTADINE HYDROCHLORIDE 100 MG/1
100 CAPSULE, GELATIN COATED ORAL 2 TIMES DAILY
Qty: 60 CAPSULE | Refills: 3 | Status: SHIPPED | OUTPATIENT
Start: 2019-10-01 | End: 2020-01-07

## 2019-10-03 LAB — ANA IGG, ELISA: DETECTED

## 2019-10-04 LAB
ARSENIC BLOOD: <10 UG/L (ref 0–12)
DOUBLE STRANDED DNA AB, IGG: DETECTED
LEAD LEVEL BLOOD: <2 UG/DL (ref 0–4.9)
MERCURY BLOOD: <2.5 UG/L (ref 0–10)

## 2019-10-05 LAB
ENA TO RNP ANTIBODY: 4 AU/ML (ref 0–40)
ENA TO SMITH (SM) ANTIBODY: 2 AU/ML (ref 0–40)
ENA TO SSB (LA) ANTIBODY: 29 AU/ML (ref 0–40)
MANGANESE, BLOOD: <1 UG/L (ref 0–2)
SCLERODERMA (SCL-70) AB: 4 AU/ML (ref 0–40)
SSA 52 (RO) (ENA) AB, IGG: 1 AU/ML (ref 0–40)
SSA 60 (RO) (ENA) AB, IGG: 4 AU/ML (ref 0–40)
VITAMIN B1, PLASMA: 9 NMOL/L (ref 4–15)

## 2019-10-07 DIAGNOSIS — R76.8 DS DNA ANTIBODY POSITIVE: ICD-10-CM

## 2019-10-07 DIAGNOSIS — R76.8 POSITIVE ANA (ANTINUCLEAR ANTIBODY): Primary | ICD-10-CM

## 2019-10-10 ENCOUNTER — TELEPHONE (OUTPATIENT)
Dept: NEUROLOGY | Age: 74
End: 2019-10-10

## 2020-01-07 ENCOUNTER — OFFICE VISIT (OUTPATIENT)
Dept: CARDIOLOGY | Age: 75
End: 2020-01-07
Payer: MEDICARE

## 2020-01-07 VITALS
HEIGHT: 67 IN | SYSTOLIC BLOOD PRESSURE: 102 MMHG | DIASTOLIC BLOOD PRESSURE: 68 MMHG | WEIGHT: 190 LBS | BODY MASS INDEX: 29.82 KG/M2 | HEART RATE: 63 BPM

## 2020-01-07 PROCEDURE — 4040F PNEUMOC VAC/ADMIN/RCVD: CPT | Performed by: NURSE PRACTITIONER

## 2020-01-07 PROCEDURE — 1123F ACP DISCUSS/DSCN MKR DOCD: CPT | Performed by: NURSE PRACTITIONER

## 2020-01-07 PROCEDURE — G8417 CALC BMI ABV UP PARAM F/U: HCPCS | Performed by: NURSE PRACTITIONER

## 2020-01-07 PROCEDURE — 3017F COLORECTAL CA SCREEN DOC REV: CPT | Performed by: NURSE PRACTITIONER

## 2020-01-07 PROCEDURE — 1036F TOBACCO NON-USER: CPT | Performed by: NURSE PRACTITIONER

## 2020-01-07 PROCEDURE — 99203 OFFICE O/P NEW LOW 30 MIN: CPT | Performed by: NURSE PRACTITIONER

## 2020-01-07 PROCEDURE — 93000 ELECTROCARDIOGRAM COMPLETE: CPT | Performed by: NURSE PRACTITIONER

## 2020-01-07 PROCEDURE — G8427 DOCREV CUR MEDS BY ELIG CLIN: HCPCS | Performed by: NURSE PRACTITIONER

## 2020-01-07 PROCEDURE — G8484 FLU IMMUNIZE NO ADMIN: HCPCS | Performed by: NURSE PRACTITIONER

## 2020-01-07 RX ORDER — GABAPENTIN 300 MG/1
300 CAPSULE ORAL DAILY
COMMUNITY
End: 2020-01-07

## 2020-01-07 RX ORDER — ASPIRIN 325 MG
325 TABLET ORAL DAILY
COMMUNITY
End: 2020-01-07

## 2020-01-07 RX ORDER — SILDENAFIL 100 MG/1
100 TABLET, FILM COATED ORAL PRN
COMMUNITY
End: 2020-01-07

## 2020-01-07 RX ORDER — ATORVASTATIN CALCIUM 20 MG/1
20 TABLET, FILM COATED ORAL DAILY
COMMUNITY
End: 2020-07-29 | Stop reason: SDUPTHER

## 2020-01-07 RX ORDER — NITROGLYCERIN 0.4 MG/1
0.4 TABLET SUBLINGUAL PRN
Qty: 25 TABLET | Refills: 3 | Status: SHIPPED | OUTPATIENT
Start: 2020-01-07

## 2020-01-07 RX ORDER — DIPHENHYDRAMINE HCL 25 MG
25 TABLET ORAL EVERY 6 HOURS PRN
COMMUNITY
End: 2020-01-07

## 2020-01-07 RX ORDER — CLONAZEPAM 0.5 MG/1
0.5 TABLET ORAL NIGHTLY
COMMUNITY
End: 2022-07-25 | Stop reason: SDUPTHER

## 2020-01-07 RX ORDER — BUPROPION HYDROCHLORIDE 150 MG/1
150 TABLET ORAL EVERY MORNING
COMMUNITY
End: 2020-01-07

## 2020-01-07 RX ORDER — PHENOL 1.4 %
AEROSOL, SPRAY (ML) MUCOUS MEMBRANE NIGHTLY
COMMUNITY

## 2020-01-07 RX ORDER — AMANTADINE HYDROCHLORIDE 100 MG/1
100 CAPSULE, GELATIN COATED ORAL NIGHTLY
COMMUNITY
End: 2021-04-30

## 2020-01-07 ASSESSMENT — ENCOUNTER SYMPTOMS
TROUBLE SWALLOWING: 0
ABDOMINAL PAIN: 0
EYE DISCHARGE: 0
VOMITING: 0
COUGH: 0
ABDOMINAL DISTENTION: 0
EYE REDNESS: 0
NAUSEA: 0
SHORTNESS OF BREATH: 0
BLOOD IN STOOL: 0
FACIAL SWELLING: 0
WHEEZING: 0
COLOR CHANGE: 0

## 2020-01-07 NOTE — PROGRESS NOTES
[address] [place] [address] Cardiology  [address]  [address]  Phone: [phone]  Fax: [phone]    OFFICE VISIT:  2020    Jaden Baugh - : 1945    Reason For Visit:  Sai Oliva is a 75 y.o. male who is here for New Patient (patient stated had light chest pain,took nitro-no other cardiac symptoms)        HPI    Mr. Newell Holstein is a 75 y.o. male with history of coronary artery disease, hyperlipidemia, a family history of CAD, and obstructive sleep apnea requiring CPAP at night who presents with the chief complaint of chest pain. Patient states that on  he was in his basement pulling around when he began to have a dull ache on the left side of his chest.  He had accompanying lightheadedness and dizziness. He took 1 sublingual nitroglycerin at home and by the time he arrived at the hospital his chest pain has resolved. The discomfort lasted approximately less than 30 minutes this was his first major episode of chest discomfort since having cardiac bypass surgery in . He has not had any further chest pain since this episode. Patient states he has chronic fatigue that has improved since beginning to wear CPAP at night. Sai Oliva has no symptomatic tachy- or glenroy-arrhythmia. No syncope. No numbness or weakness to suggest cerebrovascular accident or transient ischemic attack. he denies signs of bleeding. Reports no edema or shortness of breath. He denies orthopnea or paroxysmal nocturnal dyspnea. he is sleeping on 1 pillow at night. he has been compliant with his medications. Maki Carmona MD is PCP.   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     Past Medical History:   Diagnosis Date    Acute sinusitis     Arthritis     Atherosclerosis of coronary artery bypass graft(s) without angina pectoris     Bone pain     Cardiovascular disease     Chest pain     pressure    CPAP (continuous positive airway pressure) dependence 04/2018    8cm    Diarrhea     Dyspnea on exertion     ED (erectile dysfunction)     Glaucoma     pre glaucoma    Hyperlipidemia     Hypertension     Hypoglycemia, unspecified     Left shoulder pain     Low back pain     Memory disorder     Nocturia     Obstructive sleep apnea     AHI:  47 per [facility] in [place], 3/2018    Other fatigue     Polycythemia     REM sleep behavior disorder     Sleep terrors     Unstable angina (HCC)      Past Surgical History:   Procedure Laterality Date    APPENDECTOMY      CORONARY ARTERY BYPASS GRAFT      CORONARY ARTERY BYPASS GRAFT      aortofemoral    HERNIA REPAIR      HIP ARTHROPLASTY Right      Family History   Problem Relation Age of Onset    No Known Problems Mother     Heart Disease Father      Social History     Tobacco Use    Smoking status: Never Smoker    Smokeless tobacco: Never Used   Substance Use Topics    Alcohol use: No      Current Outpatient Medications   Medication Sig Dispense Refill    Melatonin 10 MG TABS Take by mouth every morning      atorvastatin (LIPITOR) 20 MG tablet Take 20 mg by mouth daily      amantadine (SYMMETREL) 100 MG capsule Take 100 mg by mouth nightly       clonazePAM (KLONOPIN) 0.5 MG tablet Take 0.5 mg by mouth nightly.  Indications: patient takes 1/2 tablet      nitroGLYCERIN (NITROSTAT) 0.4 MG SL tablet Place 1 tablet under the tongue as needed for Chest pain 25 tablet 3    memantine (NAMENDA) 10 MG tablet Take 1 tablet by mouth 2 times daily 60 tablet 3    aspirin EC 81 MG EC tablet Take 81 mg by mouth nightly      latanoprost (XALATAN) 0.005 % ophthalmic solution Place 0.005 drops into both eyes nightly  6     No to large-  caliber vessel. 3. A 40% to 50% lesion in the mid left circumflex artery. 4. An 80% proximal right coronary artery lesion. Assessment, Recommendations, & Plan:  75 y.o. male with      Diagnosis Orders   1. Chest pain, unspecified type  EKG 12 lead    Comprehensive Metabolic Panel    Lipid Panel    NM MYOCARDIAL SPECT REST EXERCISE OR RX    Echo 2D w doppler w color complete   2. Coronary artery disease involving native heart with angina pectoris, unspecified vessel or lesion type ([address]  NM MYOCARDIAL SPECT REST EXERCISE OR RX    Echo 2D w doppler w color complete   3. Mixed hyperlipidemia     4. Sleep apnea, obstructive         1. Chest pain-first occurrence since undergone bypass surgery in 2016. Will reorder sublingual nitroglycerin tablets. Will order nuclear Jigna scan and echocardiogram.    2.  Coronary artery disease-patient underwent coronary bypass x3 in 2016 at [facility]. With patient having chest pain will order nuclear study and 2D echo. 3.  Mixed hyperlipidemia-patient continues on statin therapy. No recent lab work done. Will order today. 4.  Sleep apnea with CPAP use-managed by PCP      Orders Placed This Encounter   Procedures    NM MYOCARDIAL SPECT REST EXERCISE OR RX     Standing Status:   Future     Standing Expiration Date:   1/7/2021     Order Specific Question:   Reason for Exam?     Answer:   Chest pain     Order Specific Question:   Procedure Type     Answer:   Rx     Order Specific Question:   Reason for exam:     Answer:   chest pain    Comprehensive Metabolic Panel     Standing Status:   Future     Standing Expiration Date:   1/7/2021    Lipid Panel     Standing Status:   Future     Standing Expiration Date:   1/7/2021     Order Specific Question:   Is Patient Fasting?/# of Hours     Answer:   yes    EKG 12 lead     Order Specific Question:   Reason for Exam?     Answer:    Other     Order Specific Question:   Reason for Exam?     Answer:   Chest pain    Echo 2D w

## 2020-01-07 NOTE — PATIENT INSTRUCTIONS
Orders Placed This Encounter   Procedures    NM MYOCARDIAL SPECT REST EXERCISE OR RX     Standing Status:   Future     Standing Expiration Date:   1/7/2021     Order Specific Question:   Reason for Exam?     Answer:   Chest pain     Order Specific Question:   Procedure Type     Answer:   Rx     Order Specific Question:   Reason for exam:     Answer:   chest pain    Comprehensive Metabolic Panel     Standing Status:   Future     Standing Expiration Date:   1/7/2021    Lipid Panel     Standing Status:   Future     Standing Expiration Date:   1/7/2021     Order Specific Question:   Is Patient Fasting?/# of Hours     Answer:   yes    EKG 12 lead     Order Specific Question:   Reason for Exam?     Answer: Other     Order Specific Question:   Reason for Exam?     Answer:   Chest pain    Echo 2D w doppler w color complete     Standing Status:   Future     Standing Expiration Date:   1/7/2021     Order Specific Question:   Reason for exam:     Answer:   chest pain     Return in about 2 weeks (around 1/21/2020). Call with any questionsor concerns  Follow up with Maki Carmona MD for non cardiac problems  Report any new problems  Cardiovascular Fitness-Exercise as tolerated. Strive for 15 minutes of exercise most days of the week. Cardiac / HealthyDiet  Continue current medications as directed  Continue plan of treatment  It is always recommended that you bring your medicationsbottles with you to each visit - this is for your safety! [place] at the [facility] and [address] [facility] located on the first floor of [place] [address] through hospital main entrance and turn immediately to your left. Date/Time:     Patient's contact number:  [phone] (home)     Echocardiogram -  No prep. Takes approximately 30 min. An echocardiogram uses sound waves to produce images of your heart. This commonly used test allows your doctor to see how your heart is beating and pumping blood.  Your Do not take any of your medications the morning of the test, but bring all morning medications with you as you will take them after the stress portion of the test is completed.  Do not eat Bananas 24 hours prior to test.     No caffeine 24 hours prior to the testing. This includes: coffee, pop/soda, chocolate, cold medications, etc.  Any product that might contain caffeine.  No nicotine or alcohol 12 hours prior to your test.    Nothing to eat or drink 4-6 hours prior to appointment time. It is okay to drink small amounts of water during the four hours prior to the test.   Nitroglycerin patches must be taken off 1 hour before testing.  Wear comfortable clothing.  Please refrain from any strenuous exercise or activities the day before your test, or the day of your test.   The Nuclear Lexiscan Stress test takes about 2 ½ to 3 hours to complete. If for any reason you are unable to keep this appointment, please contact Outpatient Scheduling, [phone], as soon as possible to reschedule. How to take:  NITROGLYCERIN (Nitrostat) 0.4 mg tablets, sublingual.  Nitroglycerin is in a group of drugs called nitrates. Nitroglycerin dilates (widens) blood vessels, making it easier for blood to flow through them and easier for the heart to pump. Dosing Guidelines for Nitroglycerin Tablets  · At the start of an angina (chest pain) attack, place one tablet under the tongue or between the cheek and gum. Do not swallow or chew the tablet; let it dissolve on its own. If necessary, a second and third tablet may be used, with five minutes between using each tablet. If you use a third tablet and your chest pain continues, it is time to seek immediate medical attention. Call 911 immediately and have someone drive you to the emergency room. You may be having a heart attack or other serious heart problem.     · To prevent angina from exercise or stress, use 1 tablet 5 to 10 minutes before the activity.

## 2020-01-09 ENCOUNTER — TELEPHONE (OUTPATIENT)
Dept: CARDIOLOGY | Age: 75
End: 2020-01-09

## 2020-01-14 ENCOUNTER — HOSPITAL ENCOUNTER (OUTPATIENT)
Dept: NON INVASIVE DIAGNOSTICS | Age: 75
Discharge: HOME | End: 2020-01-14
Payer: MEDICARE

## 2020-01-14 ENCOUNTER — HOSPITAL ENCOUNTER (OUTPATIENT)
Dept: NUCLEAR MEDICINE | Age: 75
Discharge: HOME | End: 2020-01-16
Payer: MEDICARE

## 2020-01-14 LAB
LV EF: 48 %
LV EF: 50 %
LVEF MODALITY: NORMAL
LVEF MODALITY: NORMAL

## 2020-01-14 PROCEDURE — A9500 TC99M SESTAMIBI: HCPCS | Performed by: NURSE PRACTITIONER

## 2020-01-14 PROCEDURE — 93017 CV STRESS TEST TRACING ONLY: CPT

## 2020-01-14 PROCEDURE — 78452 HT MUSCLE IMAGE SPECT MULT: CPT

## 2020-01-14 PROCEDURE — C8929 TTE W OR WO FOL WCON,DOPPLER: HCPCS

## 2020-01-14 PROCEDURE — 6360000004 HC RX CONTRAST MEDICATION: Performed by: INTERNAL MEDICINE

## 2020-01-14 PROCEDURE — 6360000002 HC RX W HCPCS: Performed by: NURSE PRACTITIONER

## 2020-01-14 PROCEDURE — 3430000000 HC RX DIAGNOSTIC RADIOPHARMACEUTICAL: Performed by: NURSE PRACTITIONER

## 2020-01-14 RX ADMIN — PERFLUTREN 2.2 MG: 6.52 INJECTION, SUSPENSION INTRAVENOUS at 09:05

## 2020-01-14 RX ADMIN — TETRAKIS(2-METHOXYISOBUTYLISOCYANIDE)COPPER(I) TETRAFLUOROBORATE 30 MILLICURIE: 1 INJECTION, POWDER, LYOPHILIZED, FOR SOLUTION INTRAVENOUS at 12:54

## 2020-01-14 RX ADMIN — REGADENOSON 0.4 MG: 0.08 INJECTION, SOLUTION INTRAVENOUS at 12:12

## 2020-01-14 RX ADMIN — TETRAKIS(2-METHOXYISOBUTYLISOCYANIDE)COPPER(I) TETRAFLUOROBORATE 10 MILLICURIE: 1 INJECTION, POWDER, LYOPHILIZED, FOR SOLUTION INTRAVENOUS at 12:53

## 2020-01-15 ENCOUNTER — TELEPHONE (OUTPATIENT)
Dept: CARDIOLOGY | Age: 75
End: 2020-01-15

## 2020-01-16 ENCOUNTER — OFFICE VISIT (OUTPATIENT)
Dept: CARDIOLOGY | Age: 75
End: 2020-01-16
Payer: MEDICARE

## 2020-01-16 VITALS
HEART RATE: 64 BPM | SYSTOLIC BLOOD PRESSURE: 124 MMHG | BODY MASS INDEX: 30.13 KG/M2 | HEIGHT: 67 IN | WEIGHT: 192 LBS | DIASTOLIC BLOOD PRESSURE: 66 MMHG

## 2020-01-16 PROBLEM — I25.10 CORONARY ARTERY DISEASE INVOLVING NATIVE CORONARY ARTERY: Status: ACTIVE | Noted: 2020-01-16

## 2020-01-16 PROBLEM — Z95.1 HX OF CABG: Status: ACTIVE | Noted: 2020-01-16

## 2020-01-16 PROBLEM — R94.39 ABNORMAL STRESS TEST: Status: ACTIVE | Noted: 2020-01-16

## 2020-01-16 PROCEDURE — G8427 DOCREV CUR MEDS BY ELIG CLIN: HCPCS | Performed by: INTERNAL MEDICINE

## 2020-01-16 PROCEDURE — G8417 CALC BMI ABV UP PARAM F/U: HCPCS | Performed by: INTERNAL MEDICINE

## 2020-01-16 PROCEDURE — G8484 FLU IMMUNIZE NO ADMIN: HCPCS | Performed by: INTERNAL MEDICINE

## 2020-01-16 PROCEDURE — 3017F COLORECTAL CA SCREEN DOC REV: CPT | Performed by: INTERNAL MEDICINE

## 2020-01-16 PROCEDURE — 99204 OFFICE O/P NEW MOD 45 MIN: CPT | Performed by: INTERNAL MEDICINE

## 2020-01-16 PROCEDURE — 1123F ACP DISCUSS/DSCN MKR DOCD: CPT | Performed by: INTERNAL MEDICINE

## 2020-01-16 PROCEDURE — 4040F PNEUMOC VAC/ADMIN/RCVD: CPT | Performed by: INTERNAL MEDICINE

## 2020-01-16 PROCEDURE — 1036F TOBACCO NON-USER: CPT | Performed by: INTERNAL MEDICINE

## 2020-01-16 RX ORDER — METOPROLOL SUCCINATE 25 MG/1
25 TABLET, EXTENDED RELEASE ORAL DAILY
Qty: 30 TABLET | Refills: 5 | Status: SHIPPED | OUTPATIENT
Start: 2020-01-16 | End: 2020-07-29 | Stop reason: SDUPTHER

## 2020-01-16 NOTE — PROGRESS NOTES
[facility] Progress Note                            Date:  1/16/2020  Patient: Crow Jimenez  Age:  75 y.o., 1945      Reason for evaluation:         SUBJECTIVE:    Patient here for cardiology assessment and evaluation of recent stress test.  History of coronary artery disease underwent CABG times 3/2016 has not undergone invasive assessment since then. From a symptom standpoint he was seen at [facility] emergency department several weeks ago with a brief episode of chest pain he was evaluated and released he has had no further chest pain since then he does not take nitroglycerin at home although he has it available I believe denies exertional chest discomfort with normal routine activities on a flat surface denies exertional dyspnea. Recent stress test 1/14/2020 revealed ejection fraction 50% septal hypokinesis small apical defect with a summed difference score of 2 interpreted as borderline study no other complaints. Echocardiogram 1/14/2020 revealed trace mitral and tricuspid regurgitation dilated left atrium mildly decreased ejection fraction 45 to 50% no other abnormalities. Review of Systems      OBJECTIVE:     /66   Pulse 64   Ht 5' 7\" (1.702 m)   Wt 192 lb (87.1 kg)   BMI 30.07 kg/m²     Labs:   CBC: No results for input(s): WBC, HGB, HCT, PLT in the last 72 hours. BMP:No results for input(s): NA, K, CO2, BUN, CREATININE, LABGLOM, GLUCOSE in the last 72 hours. BNP: No results for input(s): BNP in the last 72 hours. PT/INR: No results for input(s): PROTIME, INR in the last 72 hours. APTT:No results for input(s): APTT in the last 72 hours. CARDIAC ENZYMES:No results for input(s): CKTOTAL, CKMB, CKMBINDEX, TROPONINI in the last 72 hours. FASTING LIPID PANEL:No results found for: HDL, LDLDIRECT, LDLCALC, TRIG  LIVER PROFILE:No results for input(s): AST, ALT, LABALBU in the last 72 hours.         Past Medical History:   Diagnosis Date    Acute sinusitis    

## 2020-01-21 ENCOUNTER — OFFICE VISIT (OUTPATIENT)
Dept: NEUROLOGY | Age: 75
End: 2020-01-21
Payer: MEDICARE

## 2020-01-21 VITALS
BODY MASS INDEX: 30.2 KG/M2 | SYSTOLIC BLOOD PRESSURE: 105 MMHG | WEIGHT: 192.4 LBS | DIASTOLIC BLOOD PRESSURE: 63 MMHG | HEART RATE: 59 BPM | HEIGHT: 67 IN | RESPIRATION RATE: 16 BRPM

## 2020-01-21 PROCEDURE — 99214 OFFICE O/P EST MOD 30 MIN: CPT | Performed by: PSYCHIATRY & NEUROLOGY

## 2020-01-21 PROCEDURE — G8484 FLU IMMUNIZE NO ADMIN: HCPCS | Performed by: PSYCHIATRY & NEUROLOGY

## 2020-01-21 PROCEDURE — 1123F ACP DISCUSS/DSCN MKR DOCD: CPT | Performed by: PSYCHIATRY & NEUROLOGY

## 2020-01-21 PROCEDURE — 3017F COLORECTAL CA SCREEN DOC REV: CPT | Performed by: PSYCHIATRY & NEUROLOGY

## 2020-01-21 PROCEDURE — G8427 DOCREV CUR MEDS BY ELIG CLIN: HCPCS | Performed by: PSYCHIATRY & NEUROLOGY

## 2020-01-21 PROCEDURE — 4040F PNEUMOC VAC/ADMIN/RCVD: CPT | Performed by: PSYCHIATRY & NEUROLOGY

## 2020-01-21 PROCEDURE — G8417 CALC BMI ABV UP PARAM F/U: HCPCS | Performed by: PSYCHIATRY & NEUROLOGY

## 2020-01-21 PROCEDURE — 1036F TOBACCO NON-USER: CPT | Performed by: PSYCHIATRY & NEUROLOGY

## 2020-01-21 NOTE — PATIENT INSTRUCTIONS
INSTRUCTIONS:  1. Take the amantadine 100 mg twice a day, morning and afternoon. 2. Continue to use CPAP during sleep. 3. Continue to take Melatonin and clonazepam at bedtime for the REM sleep behavior disorder. Can increase the clonazepam if necessary.

## 2020-01-21 NOTE — PROGRESS NOTES
 CORONARY ARTERY BYPASS GRAFT      aortofemoral    DIAGNOSTIC CARDIAC CATH LAB PROCEDURE      HERNIA REPAIR      HIP ARTHROPLASTY Right        Recent Hospitalizations  · None    Significant Injuries  · None    Habits  Santiago Brown reports that he has never smoked. He has never used smokeless tobacco. He reports that he does not drink alcohol or use drugs. Family History   Problem Relation Age of Onset    No Known Problems Mother     Heart Disease Father        Social History  Santiago Brown is , lives in [address], and is retired. Medications:  Current Outpatient Medications   Medication Sig Dispense Refill    metoprolol succinate (TOPROL XL) 25 MG extended release tablet Take 1 tablet by mouth daily 30 tablet 5    Melatonin 10 MG TABS Take by mouth every morning      atorvastatin (LIPITOR) 20 MG tablet Take 20 mg by mouth daily      clonazePAM (KLONOPIN) 0.5 MG tablet Take 0.5 mg by mouth nightly. Indications: patient takes 1/2 tablet      nitroGLYCERIN (NITROSTAT) 0.4 MG SL tablet Place 1 tablet under the tongue as needed for Chest pain 25 tablet 3    memantine (NAMENDA) 10 MG tablet Take 1 tablet by mouth 2 times daily 60 tablet 3    aspirin EC 81 MG EC tablet Take 81 mg by mouth nightly      latanoprost (XALATAN) 0.005 % ophthalmic solution Place 0.005 drops into both eyes nightly  6    amantadine (SYMMETREL) 100 MG capsule Take 100 mg by mouth nightly        No current facility-administered medications for this visit. Allergies:   Allergies as of 01/21/2020    (No Known Allergies)       Review of Systems:  General ROS: negative for - chills or fever  Psychological ROS: negative for - anxiety or depression  ENT ROS: negative for - headaches or sinus pain  Hematological and Lymphatic ROS: negative for - bleeding problems, bruising or swollen lymph nodes  Respiratory ROS: negative for - cough, hemoptysis or shortness of breath  Cardiovascular ROS: negative for - chest

## 2020-03-02 RX ORDER — MEMANTINE HYDROCHLORIDE 10 MG/1
10 TABLET ORAL 2 TIMES DAILY
Qty: 60 TABLET | Refills: 11 | Status: SHIPPED | OUTPATIENT
Start: 2020-03-02 | End: 2021-03-29 | Stop reason: SDUPTHER

## 2020-03-02 NOTE — TELEPHONE ENCOUNTER
Requested Prescriptions     Pending Prescriptions Disp Refills    memantine (NAMENDA) 10 MG tablet 60 tablet 3     Sig: Take 1 tablet by mouth 2 times daily       Last Office Visit: 1/21/2020  Next Office Visit: 7/21/2020  Last Medication Refill: 6/24/19 with 3 refills

## 2020-04-15 ENCOUNTER — TELEPHONE (OUTPATIENT)
Dept: CARDIOLOGY | Age: 75
End: 2020-04-15

## 2020-04-23 ENCOUNTER — TELEMEDICINE (OUTPATIENT)
Dept: CARDIOLOGY | Age: 75
End: 2020-04-23
Payer: MEDICARE

## 2020-04-23 PROCEDURE — 1123F ACP DISCUSS/DSCN MKR DOCD: CPT | Performed by: INTERNAL MEDICINE

## 2020-04-23 PROCEDURE — 3017F COLORECTAL CA SCREEN DOC REV: CPT | Performed by: INTERNAL MEDICINE

## 2020-04-23 PROCEDURE — G8417 CALC BMI ABV UP PARAM F/U: HCPCS | Performed by: INTERNAL MEDICINE

## 2020-04-23 PROCEDURE — 4040F PNEUMOC VAC/ADMIN/RCVD: CPT | Performed by: INTERNAL MEDICINE

## 2020-04-23 PROCEDURE — G8427 DOCREV CUR MEDS BY ELIG CLIN: HCPCS | Performed by: INTERNAL MEDICINE

## 2020-04-23 PROCEDURE — 99213 OFFICE O/P EST LOW 20 MIN: CPT | Performed by: INTERNAL MEDICINE

## 2020-04-23 PROCEDURE — 1036F TOBACCO NON-USER: CPT | Performed by: INTERNAL MEDICINE

## 2020-04-23 ASSESSMENT — ENCOUNTER SYMPTOMS
RESPIRATORY NEGATIVE: 1
GASTROINTESTINAL NEGATIVE: 1
NAUSEA: 0
SHORTNESS OF BREATH: 0
EYES NEGATIVE: 1
VOMITING: 0
DIARRHEA: 0

## 2020-04-27 DIAGNOSIS — I25.10 CORONARY ARTERY DISEASE INVOLVING NATIVE CORONARY ARTERY OF NATIVE HEART WITHOUT ANGINA PECTORIS: ICD-10-CM

## 2020-04-27 DIAGNOSIS — Z95.1 HX OF CABG: ICD-10-CM

## 2020-04-27 LAB
CHOLESTEROL, TOTAL: 131 MG/DL (ref 160–199)
HDLC SERPL-MCNC: 43 MG/DL (ref 55–121)
LDL CHOLESTEROL CALCULATED: 59 MG/DL
TRIGL SERPL-MCNC: 146 MG/DL (ref 0–149)

## 2020-05-11 ENCOUNTER — TELEPHONE (OUTPATIENT)
Dept: CARDIOLOGY | Age: 75
End: 2020-05-11

## 2020-05-11 NOTE — TELEPHONE ENCOUNTER
Received a call from patient's spouse that he was not feeling well this morning, he was cold and clammy and just did not feel right. Patient denies any CP or SOB. She checked his bp and it was around 107/50s.   He is feeling better now and bp is back around 123/70
Spoke with patient wife.  Wanted to see MD. Scheduled dion for Thursday at [address].
Principal Discharge DX:	Concussion, without LOC, initial encounter

## 2020-05-14 ENCOUNTER — OFFICE VISIT (OUTPATIENT)
Dept: CARDIOLOGY | Age: 75
End: 2020-05-14
Payer: MEDICARE

## 2020-05-14 VITALS
WEIGHT: 194 LBS | DIASTOLIC BLOOD PRESSURE: 72 MMHG | HEART RATE: 68 BPM | BODY MASS INDEX: 31.18 KG/M2 | SYSTOLIC BLOOD PRESSURE: 100 MMHG | HEIGHT: 66 IN

## 2020-05-14 PROBLEM — E78.00 HYPERCHOLESTEROLEMIA: Status: ACTIVE | Noted: 2020-05-14

## 2020-05-14 PROCEDURE — 1123F ACP DISCUSS/DSCN MKR DOCD: CPT | Performed by: INTERNAL MEDICINE

## 2020-05-14 PROCEDURE — 3017F COLORECTAL CA SCREEN DOC REV: CPT | Performed by: INTERNAL MEDICINE

## 2020-05-14 PROCEDURE — G8427 DOCREV CUR MEDS BY ELIG CLIN: HCPCS | Performed by: INTERNAL MEDICINE

## 2020-05-14 PROCEDURE — 4040F PNEUMOC VAC/ADMIN/RCVD: CPT | Performed by: INTERNAL MEDICINE

## 2020-05-14 PROCEDURE — 99214 OFFICE O/P EST MOD 30 MIN: CPT | Performed by: INTERNAL MEDICINE

## 2020-05-14 PROCEDURE — 1036F TOBACCO NON-USER: CPT | Performed by: INTERNAL MEDICINE

## 2020-05-14 PROCEDURE — 93000 ELECTROCARDIOGRAM COMPLETE: CPT | Performed by: INTERNAL MEDICINE

## 2020-05-14 PROCEDURE — G8417 CALC BMI ABV UP PARAM F/U: HCPCS | Performed by: INTERNAL MEDICINE

## 2020-05-14 RX ORDER — ZINC GLUCONATE 50 MG
50 TABLET ORAL DAILY
COMMUNITY
End: 2021-10-28

## 2020-05-14 ASSESSMENT — ENCOUNTER SYMPTOMS
NAUSEA: 0
RESPIRATORY NEGATIVE: 1
SHORTNESS OF BREATH: 0
VOMITING: 0
DIARRHEA: 0
GASTROINTESTINAL NEGATIVE: 1
EYES NEGATIVE: 1

## 2020-07-21 ENCOUNTER — HOSPITAL ENCOUNTER (OUTPATIENT)
Dept: PAIN MANAGEMENT | Age: 75
Discharge: HOME | End: 2020-07-21
Payer: MEDICARE

## 2020-07-21 ENCOUNTER — OFFICE VISIT (OUTPATIENT)
Dept: NEUROLOGY | Age: 75
End: 2020-07-21
Payer: MEDICARE

## 2020-07-21 VITALS
HEART RATE: 68 BPM | BODY MASS INDEX: 31.18 KG/M2 | SYSTOLIC BLOOD PRESSURE: 100 MMHG | HEIGHT: 66 IN | WEIGHT: 194 LBS | DIASTOLIC BLOOD PRESSURE: 64 MMHG

## 2020-07-21 VITALS
SYSTOLIC BLOOD PRESSURE: 139 MMHG | DIASTOLIC BLOOD PRESSURE: 74 MMHG | OXYGEN SATURATION: 98 % | TEMPERATURE: 96.2 F | HEART RATE: 70 BPM | RESPIRATION RATE: 18 BRPM

## 2020-07-21 PROCEDURE — 4040F PNEUMOC VAC/ADMIN/RCVD: CPT | Performed by: PSYCHIATRY & NEUROLOGY

## 2020-07-21 PROCEDURE — 1036F TOBACCO NON-USER: CPT | Performed by: PSYCHIATRY & NEUROLOGY

## 2020-07-21 PROCEDURE — G8427 DOCREV CUR MEDS BY ELIG CLIN: HCPCS | Performed by: PSYCHIATRY & NEUROLOGY

## 2020-07-21 PROCEDURE — 6360000002 HC RX W HCPCS

## 2020-07-21 PROCEDURE — 2500000003 HC RX 250 WO HCPCS

## 2020-07-21 PROCEDURE — G8417 CALC BMI ABV UP PARAM F/U: HCPCS | Performed by: PSYCHIATRY & NEUROLOGY

## 2020-07-21 PROCEDURE — 3209999900 FLUORO FOR SURGICAL PROCEDURES

## 2020-07-21 PROCEDURE — 2580000003 HC RX 258

## 2020-07-21 PROCEDURE — 99214 OFFICE O/P EST MOD 30 MIN: CPT | Performed by: PSYCHIATRY & NEUROLOGY

## 2020-07-21 PROCEDURE — 1123F ACP DISCUSS/DSCN MKR DOCD: CPT | Performed by: PSYCHIATRY & NEUROLOGY

## 2020-07-21 PROCEDURE — 62323 NJX INTERLAMINAR LMBR/SAC: CPT

## 2020-07-21 RX ORDER — ZINC GLUCONATE 50 MG
50 TABLET ORAL DAILY
COMMUNITY

## 2020-07-21 RX ORDER — SULFAMETHOXAZOLE AND TRIMETHOPRIM 400; 80 MG/1; MG/1
1 TABLET ORAL 2 TIMES DAILY
COMMUNITY
End: 2020-07-29

## 2020-07-21 RX ORDER — TAMSULOSIN HYDROCHLORIDE 0.4 MG/1
0.4 CAPSULE ORAL DAILY
COMMUNITY

## 2020-07-21 RX ORDER — LIDOCAINE HYDROCHLORIDE 10 MG/ML
INJECTION, SOLUTION EPIDURAL; INFILTRATION; INTRACAUDAL; PERINEURAL
Status: COMPLETED | OUTPATIENT
Start: 2020-07-21 | End: 2020-07-21

## 2020-07-21 RX ORDER — ALLOPURINOL 100 MG/1
100 TABLET ORAL DAILY
COMMUNITY
End: 2021-04-30

## 2020-07-21 RX ORDER — HYDROCODONE BITARTRATE AND ACETAMINOPHEN 5; 325 MG/1; MG/1
1 TABLET ORAL EVERY 6 HOURS PRN
COMMUNITY
End: 2021-04-30

## 2020-07-21 RX ORDER — METHYLPREDNISOLONE ACETATE 80 MG/ML
INJECTION, SUSPENSION INTRA-ARTICULAR; INTRALESIONAL; INTRAMUSCULAR; SOFT TISSUE
Status: COMPLETED | OUTPATIENT
Start: 2020-07-21 | End: 2020-07-21

## 2020-07-21 RX ORDER — SODIUM CHLORIDE 9 MG/ML
INJECTION INTRAVENOUS
Status: COMPLETED | OUTPATIENT
Start: 2020-07-21 | End: 2020-07-21

## 2020-07-21 RX ADMIN — METHYLPREDNISOLONE ACETATE 80 MG: 80 INJECTION, SUSPENSION INTRA-ARTICULAR; INTRALESIONAL; INTRAMUSCULAR; SOFT TISSUE at 09:21

## 2020-07-21 RX ADMIN — LIDOCAINE HYDROCHLORIDE 5 ML: 10 INJECTION, SOLUTION EPIDURAL; INFILTRATION; INTRACAUDAL; PERINEURAL at 09:21

## 2020-07-21 RX ADMIN — SODIUM CHLORIDE 5 ML: 9 INJECTION INTRAVENOUS at 09:21

## 2020-07-21 ASSESSMENT — PAIN DESCRIPTION - PAIN TYPE: TYPE: CHRONIC PAIN

## 2020-07-21 ASSESSMENT — PAIN DESCRIPTION - ORIENTATION: ORIENTATION: LOWER

## 2020-07-21 ASSESSMENT — PAIN DESCRIPTION - PROGRESSION: CLINICAL_PROGRESSION: OTHER (COMMENT)

## 2020-07-21 ASSESSMENT — PAIN DESCRIPTION - ONSET: ONSET: AWAKENED FROM SLEEP

## 2020-07-21 ASSESSMENT — PAIN DESCRIPTION - DESCRIPTORS: DESCRIPTORS: ACHING

## 2020-07-21 ASSESSMENT — PAIN DESCRIPTION - FREQUENCY: FREQUENCY: CONTINUOUS

## 2020-07-21 ASSESSMENT — PAIN SCALES - GENERAL: PAINLEVEL_OUTOF10: 5

## 2020-07-21 ASSESSMENT — PAIN DESCRIPTION - LOCATION: LOCATION: BACK

## 2020-07-21 ASSESSMENT — PAIN - FUNCTIONAL ASSESSMENT: PAIN_FUNCTIONAL_ASSESSMENT: PREVENTS OR INTERFERES SOME ACTIVE ACTIVITIES AND ADLS

## 2020-07-21 NOTE — PROGRESS NOTES
[address] [facility]  [address] [place] [address] Drive, [address] [place] [address],[address]  Chad Smith  Phone [phone]  Fax [phone] [address] [facility] Follow Up Encounter  20 10:49 AM CDT    Information:   Patient Name: Coleen Daley  :   1945  Age:   67 y.o. MRN:   [id number]  Account #:  [de-identified]  Today:  20    Provider: Rupesh Villaseñor M.D. Chief Complaint:   Chief Complaint   Patient presents with    Sleep Apnea     6 month follow up        Subjective:   Coleen Daley is a 67 y.o. man with a history of mild cognitive impairment, RSBD, and ANGE who is following up. His memory is worsening. He remains independent. He talks in his sleep but does not get out of bed. He uses his CPAP nightly. He does not rest well. He complains of fatigue and daytime drowsiness. He has had no tremor, gait impairment, problems with fine motor skills.         Objective:     Past Medical History:  Past Medical History:   Diagnosis Date    Acute sinusitis     Arthritis     Atherosclerosis of coronary artery bypass graft(s) without angina pectoris     Bone pain     Cardiovascular disease     Chest pain     pressure    CPAP (continuous positive airway pressure) dependence 2018    8cm    Diarrhea     Dyspnea on exertion     ED (erectile dysfunction)     Glaucoma     pre glaucoma    Hyperlipidemia     Hypertension     Hypoglycemia, unspecified     Left shoulder pain     Low back pain     Memory disorder     Nocturia     Obstructive sleep apnea     AHI:  47 per [facility] in [place], 3/2018    Other fatigue     Polycythemia     REM sleep behavior disorder     Sleep terrors     Unstable angina (HCC)        Past Surgical History:   Procedure Laterality Date    APPENDECTOMY      CORONARY ARTERY BYPASS GRAFT      CORONARY ARTERY BYPASS GRAFT      aortofemoral    DIAGNOSTIC CARDIAC CATH LAB PROCEDURE      HERNIA REPAIR      HIP ARTHROPLASTY Right     LITHOTRIPSY         Recent Hospitalizations  · None    Significant Injuries  · None    Habits  Harriet Ivy reports that he has never smoked. He has never used smokeless tobacco. He reports that he does not drink alcohol or use drugs. Family History   Problem Relation Age of Onset    No Known Problems Mother     Heart Disease Father        Social History  Yelitza Leal is , lives [place], [place], and is retired. Medications:  Current Outpatient Medications   Medication Sig Dispense Refill    HYDROcodone-acetaminophen (NORCO) 5-325 MG per tablet Take 1 tablet by mouth every 6 hours as needed for Pain.  sulfamethoxazole-trimethoprim (BACTRIM;SEPTRA) 400-80 MG per tablet Take 1 tablet by mouth 2 times daily      allopurinol (ZYLOPRIM) 100 MG tablet Take 100 mg by mouth daily      tamsulosin (FLOMAX) 0.4 MG capsule Take 0.4 mg by mouth daily      LACTOBACILLUS PO Take by mouth      zinc gluconate 50 MG tablet Take 50 mg by mouth daily      Coenzyme Q10 (Q-10 CO-ENZYME PO) Take 200 mg by mouth daily      zinc 50 MG TABS tablet Take 50 mg by mouth daily      Probiotic Product (PROBIOTIC DAILY PO) Take 0.5 mg by mouth      memantine (NAMENDA) 10 MG tablet Take 1 tablet by mouth 2 times daily 60 tablet 11    metoprolol succinate (TOPROL XL) 25 MG extended release tablet Take 1 tablet by mouth daily 30 tablet 5    Melatonin 10 MG TABS Take by mouth every morning      atorvastatin (LIPITOR) 20 MG tablet Take 20 mg by mouth daily      amantadine (SYMMETREL) 100 MG capsule Take 100 mg by mouth nightly       clonazePAM (KLONOPIN) 0.5 MG tablet Take 0.5 mg by mouth nightly.  Indications: patient takes 1/2 tablet      nitroGLYCERIN (NITROSTAT) 0.4 MG SL tablet Place 1 tablet under the tongue as needed for Chest pain 25 tablet 3    aspirin EC 81 MG EC tablet Take 81 mg by mouth nightly      latanoprost (XALATAN) 0.005 % ophthalmic solution Place 0.005 drops into both eyes nightly  6     No current facility-administered medications for this visit. Allergies: Allergies as of 07/21/2020    (No Known Allergies)       Review of Systems:  Constitutional: negative for - chills and fever  Eyes:  negative for - visual disturbance and photophobia  HENMT: negative for - headaches and sinus pain  Respiratory: negative for - cough, hemoptysis, and shortness of breath  Cardiovascular: negative for - chest pain and palpitations  Gastrointestinal: negative for - blood in stools, constipation, diarrhea, nausea, and vomiting  Genito-Urinary: negative for - hematuria, urinary frequency, urinary urgency, and urinary retention  Musculoskeletal: negative for - joint pain, joint stiffness, and joint swelling  Hematological and Lymphatic: negative for - bleeding problems, abnormal bruising, and swollen lymph nodes  Endocrine:  negative for - polydipsia and polyphagia  Allergy/Immunology:  negative for - rhinorrhea, sinus congestion, hives  Integument:  negative for - negative for - rash, change in moles, new or changing lesions  Psychological: negative for - anxiety and depression  Neurological: positive for - memory loss  Negative for - numbness/tingling, and weakness     PHYSICAL EXAMINATION:  Vitals:  /64   Pulse 68   Ht 5' 6\" (1.676 m)   Wt 194 lb (88 kg)   BMI 31.31 kg/m²   General appearance:  Alert, well developed, well nourished, in no distress  HEENT:  normocephalic, atraumatic, sclera appear normal, no nasal abnormalities, no rhinorrhea, Ears appear normal, oral mucous membranes are moist without erythema, trachea midline, thyroid is normal, no lymphadenopathy or neck mass. Cardiovascular:  Regular rate and rhythm without murmer. No peripheral edema, No cyanosis or clubbing. No carotid bruits. Pulmonary:  Lungs are clear to auscultation. Breathing appears normal, good expansion, normal effort without use of accessory muscles  Musculoskeletal:  Joints are normal.  No splints, slings, or casts.   Spine appears normal with normal posture and range of motion. Integument:  No rash, erythema, or pallor  Psychiatric:  Mood, affect, and behavior appear normal      NEUROLOGIC EXAMINATION:  Mental Status:  alert, oriented to person, place, and time. Speech:  Clear without dysarthria or dysphonia  Language:  Fluent without aphasia  Cranial Nerves:   II Visual fields are full to confrontation   III,IV, VI Extraocular movements are full   VII Facial movements are symmetrical without weakness   VIII Hearing is intact   IX,X Shoulder shrug and head rotation strength are intact   XII No tongue atrophy or fasciculations. Normal tongue protrusion. No tongue weakness  Motor:  Normal strength in both upper and lower extremities. Normal muscle tone and bulk. Deep tendon reflexes are intact and symmetrical in both upper and lower extremities. Bentley's signs are absent bilaterally. There is no ankle clonus on either side. Coordination:  Rapid alternating movements are normal in both upper and lower extremities. Finger to nose testing is unimpaired bilaterally. Gait:  Normal station and gait. Pertinent Diagnostic Studies:  CPAP download showed 94% compliance with auto CPAP at 6cm to 20cm with residual AHI of 1.2. Assessment:       ICD-10-CM    1. Mild cognitive impairment  G31.84    2. Obstructive sleep apnea  G47.33    3. REM sleep behavior disorder  G47.52    4. Abnormal MRI of head  R93.0    5. Positive REVA (antinuclear antibody)  R76.8    6. 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 He has no parkinsonism or hallucinations so it is hard to be sure. He is objectively compliant with CPAP use. Given his heart disease, he is not a good candidate for modafinil for his somnolence. Plan:   1. Discontinue amantadine  2. Increase clonazepam to 0.5 mg or even 1 mg at HS  3. Continue CPAP use during sleep.   4. Notes to Dr. Dari Lew    Electronically signed by Radha Pedro MD on 7/21/20

## 2020-07-21 NOTE — INTERVAL H&P NOTE
Update History & Physical    The patient's History and Physical was reviewed with the patient and I examined the patient. There was   NO CHANGE:[id number]}. The surgical site was confirmed by the patient and me. Plan: The risks, benefits, expected outcome, and alternative to the recommended procedure have been discussed with the patient. Patient understands and wants to proceed with the procedure.      Electronically signed by Cristino Grimaldo MD on 7/21/2020 at 9:40 AM

## 2020-07-28 ENCOUNTER — TELEPHONE (OUTPATIENT)
Dept: CARDIOLOGY | Age: 75
End: 2020-07-28

## 2020-07-28 NOTE — TELEPHONE ENCOUNTER
Pt wife called stating pt was moving a bed and starting having SOA and broke out in a cold sweat. Pt took a break and took a nap and states he feels much better. Pt advised to report to ER if he get SOB or starts to feel bad. Pt has apt brandie to see us.

## 2020-07-29 ENCOUNTER — TELEPHONE (OUTPATIENT)
Dept: CARDIOLOGY | Age: 75
End: 2020-07-29

## 2020-07-29 ENCOUNTER — OFFICE VISIT (OUTPATIENT)
Dept: CARDIOLOGY | Age: 75
End: 2020-07-29
Payer: MEDICARE

## 2020-07-29 VITALS
SYSTOLIC BLOOD PRESSURE: 124 MMHG | HEART RATE: 60 BPM | WEIGHT: 193 LBS | BODY MASS INDEX: 31.02 KG/M2 | OXYGEN SATURATION: 97 % | HEIGHT: 66 IN | DIASTOLIC BLOOD PRESSURE: 76 MMHG

## 2020-07-29 PROBLEM — E78.2 MIXED HYPERLIPIDEMIA: Status: ACTIVE | Noted: 2020-07-29

## 2020-07-29 PROBLEM — R07.9 CHEST PAIN: Status: ACTIVE | Noted: 2020-07-29

## 2020-07-29 PROCEDURE — G8417 CALC BMI ABV UP PARAM F/U: HCPCS | Performed by: NURSE PRACTITIONER

## 2020-07-29 PROCEDURE — 99214 OFFICE O/P EST MOD 30 MIN: CPT | Performed by: NURSE PRACTITIONER

## 2020-07-29 PROCEDURE — 1123F ACP DISCUSS/DSCN MKR DOCD: CPT | Performed by: NURSE PRACTITIONER

## 2020-07-29 PROCEDURE — 1036F TOBACCO NON-USER: CPT | Performed by: NURSE PRACTITIONER

## 2020-07-29 PROCEDURE — G8427 DOCREV CUR MEDS BY ELIG CLIN: HCPCS | Performed by: NURSE PRACTITIONER

## 2020-07-29 PROCEDURE — 4040F PNEUMOC VAC/ADMIN/RCVD: CPT | Performed by: NURSE PRACTITIONER

## 2020-07-29 PROCEDURE — 93000 ELECTROCARDIOGRAM COMPLETE: CPT | Performed by: NURSE PRACTITIONER

## 2020-07-29 RX ORDER — ATORVASTATIN CALCIUM 20 MG/1
20 TABLET, FILM COATED ORAL DAILY
Qty: 90 TABLET | Refills: 3 | Status: SHIPPED | OUTPATIENT
Start: 2020-07-29 | End: 2021-08-26 | Stop reason: SDUPTHER

## 2020-07-29 RX ORDER — ISOSORBIDE MONONITRATE 30 MG/1
30 TABLET, EXTENDED RELEASE ORAL NIGHTLY
Qty: 30 TABLET | Refills: 1 | Status: SHIPPED | OUTPATIENT
Start: 2020-07-29 | End: 2021-07-01

## 2020-07-29 RX ORDER — METOPROLOL SUCCINATE 25 MG/1
25 TABLET, EXTENDED RELEASE ORAL DAILY
Qty: 90 TABLET | Refills: 3 | Status: SHIPPED | OUTPATIENT
Start: 2020-07-29 | End: 2020-07-29

## 2020-07-29 RX ORDER — MEMANTINE HYDROCHLORIDE 10 MG/1
10 TABLET ORAL NIGHTLY
Status: ON HOLD | COMMUNITY
End: 2021-11-16

## 2020-07-29 RX ORDER — CLONAZEPAM 0.5 MG/1
0.5 TABLET ORAL DAILY
COMMUNITY
End: 2021-10-28

## 2020-07-29 RX ORDER — METOPROLOL SUCCINATE 25 MG/1
12.5 TABLET, EXTENDED RELEASE ORAL DAILY
Qty: 10 TABLET | Refills: 0
Start: 2020-07-29 | End: 2020-08-24 | Stop reason: SDUPTHER

## 2020-07-29 NOTE — TELEPHONE ENCOUNTER
Patient seen in office today. Hx CAD s/p CABG in 2014 in [place]. Has been extremely tired recently. BP is borderline low on occasion. No glenroy. Sleep apnea test negative. Presented with concern over episode yesterday of SOA with chest pain and diaphoresis. Relief with rest in approximately 15 minutes. Last Lexiscan 1/20 - borderline EF at 50% with small apical defect and no reversible ischemia. I added Imdur today with intention to discuss possible cath with Dr. Korey Carrillo. Patient will follow up in 2 weeks.   tlm

## 2020-07-29 NOTE — PATIENT INSTRUCTIONS
New instructions for today:    Start Imdur (isosoribide) 30 mg (1) tab at bedtime. Decrease Toprol XL 25 mg to 1/2 tab daily. Continue blood pressure and heart log and bring to follow up visit. If symptoms worsen, go to the emergency room. I will discuss the chest pain with Dr. Junior Roche and call you back with his recommendation. Patient Instructions:  Continue current medications as prescribed. Always keep a current medication list. Bring your medications to every office visit. Continue to follow up with primary care provider for non cardiac medical problems. Call the office with any problems, questions or concerns at [phone]. If you have been asked to keep a blood pressure log, do so for 2 weeks. Call the office to report readings to the triage nurse at [phone]. Follow up with cardiologist as scheduled. The following educational material has been included in this after visit summary for your review: Life simple 7. Heart health. Life simple 7  1) Manage blood pressure - high blood pressure is a major risk factor for heart disease and stroke. Keeping blood pressure in health range reduces strain on your heart, arteries and kidneys. Blood pressure goal is less than 130/80. 2) Control cholesterol - contributes to plaque, which can clog arteries and lead to heart disease and stroke. When you control your cholesterol you are giving your arteries their best chance to remain clear. It is recommended that you get cholesterol lab work done once a year. 3) Reduce blood sugar - most of the food we eat is turning into glucose or blood sugar that our body uses for energy. Over time, high levels of blood sugar can damage your heart, kidneys, eyes and nerves. 4) Get active - living an active life is one of the most rewarding gifts you can give yourself and those you love. Simply put, daily physical activity increases your length and quality of life.  Strive to exercise 15 minutes most salt.  · Eat fewer snack items, fast foods, canned soups, and other high-salt, high-fat, processed foods. · Read food labels and try to avoid saturated and trans fats. They increase your risk of heart disease by raising cholesterol levels. · Limit the amount of solid fat-butter, margarine, and shortening-you eat. Use olive, peanut, or canola oil when you cook. Bake, broil, and steam foods instead of frying them. · Eat a variety of fruit and vegetables every day. Dark green, deep orange, red, or yellow fruits and vegetables are especially good for you. Examples include spinach, carrots, peaches, and berries. · Foods high in fiber can reduce your cholesterol and provide important vitamins and minerals. High-fiber foods include whole-grain cereals and breads, oatmeal, beans, brown rice, citrus fruits, and apples. · Eat lean proteins. Heart-healthy proteins include seafood, lean meats and poultry, eggs, beans, peas, nuts, seeds, and soy products. · Limit drinks and foods with added sugar. These include candy, desserts, and soda pop. Lifestyle changes  · If your doctor recommends it, get more exercise. Walking is a good choice. Bit by bit, increase the amount you walk every day. Try for at least 30 minutes on most days of the week. You also may want to swim, bike, or do other activities. · Do not smoke. If you need help quitting, talk to your doctor about stop-smoking programs and medicines. These can increase your chances of quitting for good. Quitting smoking may be the most important step you can take to protect your heart. It is never too late to quit. · Limit alcohol to 2 drinks a day for men and 1 drink a day for women. Too much alcohol can cause health problems. · Manage other health problems such as diabetes, high blood pressure, and high cholesterol. If you think you may have a problem with alcohol or drug use, talk to your doctor. Medicines  · Take your medicines exactly as prescribed.  Call your

## 2020-07-29 NOTE — PROGRESS NOTES
[facility], [place]. [address]  [address] [place] [address], [facility] [address] [facility]  [phone] office  [phone] fax      OFFICE VISIT:  2020    Coleen Daley - : 1945  Reason For Visit:  Dee Tierney is a 67 y.o. male who is here for Follow-up (\"few slight chest pains\"SOB,cold and sweaty,fatigue,dizziness); Coronary Artery Disease; and Chest Pain    History:   Diagnosis Orders   1. Chest pain, unspecified type  EKG 12 lead   2. Coronary artery disease involving native coronary artery of native heart, angina presence unspecified     3. Hx of CABG     4. Mixed hyperlipidemia       The patient presents today for cardiology follow up. Yesterday episode of difficulty breathing. 10-15 minutes. Little chest pain. Lasted 15 minutes. Back in  some dizzy spells with sweating. BP was low with sweating. Currently, BP is well controlled on current regimen. The patient's PCP monitors cholesterol. Has fatigue. Sleep apnea testing negative. Reports labs are current and WNL. CABG 2014 [facility]. Subjective  Dee Tierney denies  orthopnea, paroxysmal nocturnal dyspnea, syncope, presyncope, sensed arrhythmia, edema and fatigue. The patient denies numbness or weakness to suggest cerebrovascular accident or transient ischemic attack. + fatigue. + episode yesterday of SOA with chest pressure and diaphoresis. Associated diaphoresis.      Coleen Daley has the following history as recorded in [facility]:  Patient Active Problem List   Diagnosis Code    Sleep apnea, obstructive G47.33    REM sleep behavior disorder G47.52    Memory loss R41.3    Transient global amnesia G45.4    Obstructive sleep apnea G47.33    CPAP (continuous positive airway pressure) dependence Z99.89    Fatigue R53.83    Coronary artery disease involving native coronary artery of native heart I25.10    Hx of CABG Z95.1    Abnormal stress test R94.39    Hypercholesterolemia E78.00    Chest pain R07.9    Mixed hyperlipidemia E78.2     Past Medical History:   Diagnosis Date    Acute sinusitis     Arthritis     Atherosclerosis of coronary artery bypass graft(s) without angina pectoris     Bone pain     Cardiovascular disease     Chest pain     pressure    CPAP (continuous positive airway pressure) dependence 04/2018    8cm    Diarrhea     Dyspnea on exertion     ED (erectile dysfunction)     Glaucoma     pre glaucoma    Hyperlipidemia     Hypertension     Hypoglycemia, unspecified     Left shoulder pain     Low back pain     Memory disorder     Nocturia     Obstructive sleep apnea     AHI:  47 per [facility] in [place], 3/2018    Other fatigue     Polycythemia     REM sleep behavior disorder     Sleep terrors     Unstable angina (HCC)      Past Surgical History:   Procedure Laterality Date    APPENDECTOMY      CORONARY ARTERY BYPASS GRAFT      CORONARY ARTERY BYPASS GRAFT      aortofemoral    DIAGNOSTIC CARDIAC CATH LAB PROCEDURE      HERNIA REPAIR      HIP ARTHROPLASTY Right     LITHOTRIPSY       Family History   Problem Relation Age of Onset    No Known Problems Mother     Heart Disease Father      Social History     Tobacco Use    Smoking status: Never Smoker    Smokeless tobacco: Never Used   Substance Use Topics    Alcohol use: No      Current Outpatient Medications   Medication Sig Dispense Refill    clonazePAM (KLONOPIN) 0.5 MG tablet Take 0.5 mg by mouth daily.  memantine (NAMENDA) 10 MG tablet Take 10 mg by mouth nightly      HYDROcodone-acetaminophen (NORCO) 5-325 MG per tablet Take 1 tablet by mouth every 6 hours as needed for Pain.       allopurinol (ZYLOPRIM) 100 MG tablet Take 100 mg by mouth daily      tamsulosin (FLOMAX) 0.4 MG capsule Take 0.4 mg by mouth daily      LACTOBACILLUS PO Take by mouth      Coenzyme Q10 (Q-10 CO-ENZYME PO) Take 200 mg by mouth daily      zinc 50 MG TABS tablet Take 50 mg by mouth daily      Probiotic Product (PROBIOTIC DAILY PO) Take 0.5 mg by mouth      metoprolol succinate (TOPROL XL) 25 MG extended release tablet Take 1 tablet by mouth daily 30 tablet 5    Melatonin 10 MG TABS Take by mouth every morning      atorvastatin (LIPITOR) 20 MG tablet Take 20 mg by mouth daily      amantadine (SYMMETREL) 100 MG capsule Take 100 mg by mouth nightly       nitroGLYCERIN (NITROSTAT) 0.4 MG SL tablet Place 1 tablet under the tongue as needed for Chest pain 25 tablet 3    aspirin EC 81 MG EC tablet Take 81 mg by mouth nightly      latanoprost (XALATAN) 0.005 % ophthalmic solution Place 0.005 drops into both eyes nightly  6    sulfamethoxazole-trimethoprim (BACTRIM;SEPTRA) 400-80 MG per tablet Take 1 tablet by mouth 2 times daily      zinc gluconate 50 MG tablet Take 50 mg by mouth daily      memantine (NAMENDA) 10 MG tablet Take 1 tablet by mouth 2 times daily (Patient not taking: Reported on 7/29/2020) 60 tablet 11    clonazePAM (KLONOPIN) 0.5 MG tablet Take 0.5 mg by mouth nightly. Indications: patient takes 1/2 tablet       No current facility-administered medications for this visit. Allergies: Patient has no known allergies. Review of Systems  Constitutional - no appetite change, or unexpected weight change. No fever or chills. + fatigue. HEENT - no significant rhinorrhea or epistaxis. No tinnitus or significant hearing loss. Eyes - no sudden vision change or amaurosis. No corneal arcus, xantholasma, subconjunctival hemorrhage or discharge. Respiratory - no significant wheezing, stridor, apnea or cough.  + ATKINS. Cardiovascular - no orthopnea or PND. No sensation of sustained arrythmia. No occurrence of slow heart rate. No palpitations. No claudication. + fatigue. + episode yesterday of SOA with chest pressure and diaphoresis. Associated diaphoresis. Gastrointestinal - no abdominal swelling or pain. No blood in stool.  No severe constipation, diarrhea, nausea, or vomiting. Genitourinary - no dysuria, frequency, or urgency. No flank pain or hematuria. Musculoskeletal - no back pain or myalgia. No problems with gait. Extremities - no clubbing, cyanosis or extremity edema. Skin - no color change or rash. No pallor. No new surgical incision. Neurologic - no speech difficulty, facial asymmetry or lateralizing weakness. No seizures, presyncope or syncope. No significant dizziness. Hematologic - no easy bruising or excessive bleeding. Psychiatric - no severe anxiety or insomnia. No confusion. All other review of systems are negative. Objective  Vital Signs - /76   Pulse 60   Ht 5' 6\" (1.676 m)   Wt 193 lb (87.5 kg)   SpO2 97%   BMI 31.15 kg/m²   General - Israel Estrada is alert, cooperative, and pleasant. Well groomed. No acute distress. Body habitus - Body mass index is 31.15 kg/m². HEENT - Head is normocephalic. No circumoral cyanosis. Dentition is normal.  EYES -   Lids normal without ptosis. No discharge, edema or subconjunctival hemorrhage. Neck - Symmetrical without apparent mass or lymphadenopathy. Respiratory - Normal respiratory effort without use of accessory muscles. Ausculatation reveals vesicular breath sounds without crackles, wheezes, rub or rhonchi. Cardiovascular - No jugular venous distention. Auscultation reveals regular rate and rhythm. No audible clicks, gallop or rub. No murmur. No lower extremity varicosities. No carotid bruits. Abdominal -  No visible distention, mass or pulsations. Extremities - No clubbing or cyanosis. No statis dermatitis or ulcers. No edema. Musculoskeletal -   No Osler's nodes. No kyphosis or scoliosis. Gait is even and regular without limp or shuffle. Ambulates without assistance. Skin -  Warm and dry; no rash or pallor. No new surgical wound. Neurological - No focal neurological deficits. Thought processes coherent. No apparent tremor.    Oriented to person, place and time.    Psychiatric -  Appropriate affect and mood. Assessment:     Diagnosis Orders   1. Chest pain, unspecified type  EKG 12 lead   2. Coronary artery disease involving native coronary artery of native heart, angina presence unspecified     3. Hx of CABG     4. Mixed hyperlipidemia       Data reviewed:  1/14/20 Lexiscan  Summary impressions:    Borderline study with borderline ejection fraction 50% small apical    defect no reversible ischemia correlate clinically    Signed by Dr Jamil Brooks on 1/14/2020 3:24 PM     1/14/20 echo   Summary   Mitral valve leaflets are mildly thickened with preserved leaflet   mobility. Trace mitral regurgitation is present. Aortic valve appears to be tricuspid. No evidence of aortic stenosis; trivial aortic regurgitation. Tricuspid valve is structurally normal.   Trace tricuspid regurgitation. Mildly dilated left atrium. Left ventricular size is normal.   Generalized hypokinesis of the left ventricle with overall mild impairment  of LV systolic function. Left ventricular ejection fraction is low normal and is estimated at  45-50%. No evidence of left ventricular mass or thrombus noted. Definity contrast was used to enhance endocardial borders. Signature    ----------------------------------------------------------------   Electronically signed by Mariya Dalton MD(Interpreting   physician) on 01/14/2020 04:52 PM   ----------------------------------------------------------------    EKG reviewed:    Sinus  Rhythm 60 bpm - no acute ischemic changes or ectopy. -Incomplete right bundle branch block. -  Nonspecific T-abnormality. CAD s/p CABG in 2014 in [place]. Lexiscan negative for ischemia 1/20. Dizzy spells with diaphoresis with episode yesterday of severe SOA, diaphoresis and mild chest pain. Reports increasing fatigue. Add Imdur 30 mg (1) tab at hs. Due to report of low BP on occasion - decreased Toprol XL 25 mg to 1/2 tab daily.   Plan to consult Dr. Brien Rose about proceeding with cath. Advised to go to ED with worsened symptoms. Hyperlipidemia - followed by PCP. On Lipitor 20 mg daily. Patient is compliant with medication regimen. BP Readings from Last 3 Encounters:   07/29/20 124/76   07/21/20 139/74   07/21/20 100/64    Pulse Readings from Last 3 Encounters:   07/29/20 60   07/21/20 70   07/21/20 68        Wt Readings from Last 3 Encounters:   07/29/20 193 lb (87.5 kg)   07/21/20 194 lb (88 kg)   05/14/20 194 lb (88 kg)     Plan  Previous cardiac history and records reviewed. Continue current medical management. Add Imdur 30 mg (1) tab nightly. Decrease Toprol XL 25 mg to 1/2 tab daily due to low BP on occasion with dizziness. Consult Dr. Brien Rose about proceeding with cath. Had normal Lexiscan 1/20. Continue other current medications as directed. Continue to follow up with primary care provider for non cardiac medical problems. Call the office with any problems, questions or concerns at [phone]. Cardiology follow up: 2 weeks Dr. Brien Rose. Educational included in patient instructions. Heart health. NTG SL.      NAYA Cox

## 2020-07-30 NOTE — TELEPHONE ENCOUNTER
I spoke with Susan today. She has spoken with Dr. Korey Carrillo and he wants to see him the office before deciding whether patient needs a cardiac cath.   javad

## 2020-08-10 ENCOUNTER — OFFICE VISIT (OUTPATIENT)
Dept: CARDIOLOGY | Age: 75
End: 2020-08-10
Payer: MEDICARE

## 2020-08-10 VITALS
SYSTOLIC BLOOD PRESSURE: 126 MMHG | DIASTOLIC BLOOD PRESSURE: 78 MMHG | BODY MASS INDEX: 30.86 KG/M2 | HEIGHT: 66 IN | HEART RATE: 70 BPM | WEIGHT: 192 LBS

## 2020-08-10 DIAGNOSIS — E78.2 MIXED HYPERLIPIDEMIA: ICD-10-CM

## 2020-08-10 DIAGNOSIS — I25.10 CORONARY ARTERY DISEASE INVOLVING NATIVE CORONARY ARTERY OF NATIVE HEART, ANGINA PRESENCE UNSPECIFIED: ICD-10-CM

## 2020-08-10 DIAGNOSIS — I25.10 CORONARY ARTERY DISEASE INVOLVING NATIVE CORONARY ARTERY OF NATIVE HEART WITHOUT ANGINA PECTORIS: ICD-10-CM

## 2020-08-10 DIAGNOSIS — E78.00 HYPERCHOLESTEROLEMIA: ICD-10-CM

## 2020-08-10 DIAGNOSIS — G47.33 OBSTRUCTIVE SLEEP APNEA: ICD-10-CM

## 2020-08-10 DIAGNOSIS — Z95.1 HX OF CABG: ICD-10-CM

## 2020-08-10 DIAGNOSIS — R07.9 CHEST PAIN, UNSPECIFIED TYPE: ICD-10-CM

## 2020-08-10 LAB
ALBUMIN SERPL-MCNC: 4 G/DL (ref 3.5–5.2)
ALP BLD-CCNC: 97 U/L (ref 40–130)
ALT SERPL-CCNC: 27 U/L (ref 5–41)
ANION GAP SERPL CALCULATED.3IONS-SCNC: 13 MMOL/L (ref 7–19)
AST SERPL-CCNC: 26 U/L (ref 5–40)
BILIRUB SERPL-MCNC: 0.6 MG/DL (ref 0.2–1.2)
BUN BLDV-MCNC: 20 MG/DL (ref 8–23)
CALCIUM SERPL-MCNC: 9.4 MG/DL (ref 8.8–10.2)
CHLORIDE BLD-SCNC: 104 MMOL/L (ref 98–111)
CO2: 23 MMOL/L (ref 22–29)
CREAT SERPL-MCNC: 0.9 MG/DL (ref 0.5–1.2)
GFR AFRICAN AMERICAN: >59
GFR NON-AFRICAN AMERICAN: >60
GLUCOSE BLD-MCNC: 96 MG/DL (ref 74–109)
HCT VFR BLD CALC: 51.5 % (ref 42–52)
HEMOGLOBIN: 17.2 G/DL (ref 14–18)
MCH RBC QN AUTO: 30 PG (ref 27–31)
MCHC RBC AUTO-ENTMCNC: 33.4 G/DL (ref 33–37)
MCV RBC AUTO: 89.7 FL (ref 80–94)
PDW BLD-RTO: 13.4 % (ref 11.5–14.5)
PLATELET # BLD: 214 K/UL (ref 130–400)
PMV BLD AUTO: 10.5 FL (ref 9.4–12.4)
POTASSIUM SERPL-SCNC: 4.3 MMOL/L (ref 3.5–5)
RBC # BLD: 5.74 M/UL (ref 4.7–6.1)
SODIUM BLD-SCNC: 140 MMOL/L (ref 136–145)
T4 FREE: 1.24 NG/DL (ref 0.93–1.7)
TOTAL PROTEIN: 6.7 G/DL (ref 6.6–8.7)
TSH SERPL DL<=0.05 MIU/L-ACNC: 1.26 UIU/ML (ref 0.27–4.2)
WBC # BLD: 6.6 K/UL (ref 4.8–10.8)

## 2020-08-10 PROCEDURE — 4040F PNEUMOC VAC/ADMIN/RCVD: CPT | Performed by: INTERNAL MEDICINE

## 2020-08-10 PROCEDURE — 99214 OFFICE O/P EST MOD 30 MIN: CPT | Performed by: INTERNAL MEDICINE

## 2020-08-10 PROCEDURE — 1123F ACP DISCUSS/DSCN MKR DOCD: CPT | Performed by: INTERNAL MEDICINE

## 2020-08-10 PROCEDURE — G8427 DOCREV CUR MEDS BY ELIG CLIN: HCPCS | Performed by: INTERNAL MEDICINE

## 2020-08-10 PROCEDURE — 1036F TOBACCO NON-USER: CPT | Performed by: INTERNAL MEDICINE

## 2020-08-10 PROCEDURE — G8417 CALC BMI ABV UP PARAM F/U: HCPCS | Performed by: INTERNAL MEDICINE

## 2020-08-10 ASSESSMENT — ENCOUNTER SYMPTOMS
VOMITING: 0
SHORTNESS OF BREATH: 0
EYES NEGATIVE: 1
GASTROINTESTINAL NEGATIVE: 1
DIARRHEA: 0
RESPIRATORY NEGATIVE: 1
NAUSEA: 0

## 2020-08-10 NOTE — PROGRESS NOTES
angina pectoris     Bone pain     Cardiovascular disease     Chest pain     pressure    CPAP (continuous positive airway pressure) dependence 04/2018    8cm    Diarrhea     Dyspnea on exertion     ED (erectile dysfunction)     Glaucoma     pre glaucoma    Hyperlipidemia     Hypertension     Hypoglycemia, unspecified     Left shoulder pain     Low back pain     Memory disorder     Nocturia     Obstructive sleep apnea     AHI:  47 per [facility] in [place], 3/2018    Other fatigue     Polycythemia     REM sleep behavior disorder     Sleep terrors     Unstable angina (HCC)      Past Surgical History:   Procedure Laterality Date    APPENDECTOMY      CORONARY ARTERY BYPASS GRAFT      CORONARY ARTERY BYPASS GRAFT      aortofemoral    DIAGNOSTIC CARDIAC CATH LAB PROCEDURE      HERNIA REPAIR      HIP ARTHROPLASTY Right     LITHOTRIPSY       Family History   Problem Relation Age of Onset    No Known Problems Mother     Heart Disease Father      No Known Allergies  Current Outpatient Medications   Medication Sig Dispense Refill    clonazePAM (KLONOPIN) 0.5 MG tablet Take 0.5 mg by mouth daily.  memantine (NAMENDA) 10 MG tablet Take 10 mg by mouth nightly      atorvastatin (LIPITOR) 20 MG tablet Take 1 tablet by mouth daily 90 tablet 3    isosorbide mononitrate (IMDUR) 30 MG extended release tablet Take 1 tablet by mouth nightly 30 tablet 1    metoprolol succinate (TOPROL XL) 25 MG extended release tablet Take 0.5 tablets by mouth daily 10 tablet 0    HYDROcodone-acetaminophen (NORCO) 5-325 MG per tablet Take 1 tablet by mouth every 6 hours as needed for Pain.       allopurinol (ZYLOPRIM) 100 MG tablet Take 100 mg by mouth daily      tamsulosin (FLOMAX) 0.4 MG capsule Take 0.4 mg by mouth daily      LACTOBACILLUS PO Take by mouth      zinc gluconate 50 MG tablet Take 50 mg by mouth daily      Coenzyme Q10 (Q-10 CO-ENZYME PO) Take 200 mg by mouth daily      zinc 50 MG TABS tablet Take 50 mg by mouth daily      Probiotic Product (PROBIOTIC DAILY PO) Take 0.5 mg by mouth      memantine (NAMENDA) 10 MG tablet Take 1 tablet by mouth 2 times daily 60 tablet 11    Melatonin 10 MG TABS Take by mouth every morning      amantadine (SYMMETREL) 100 MG capsule Take 100 mg by mouth nightly       clonazePAM (KLONOPIN) 0.5 MG tablet Take 0.5 mg by mouth nightly. Indications: patient takes 1/2 tablet      nitroGLYCERIN (NITROSTAT) 0.4 MG SL tablet Place 1 tablet under the tongue as needed for Chest pain 25 tablet 3    aspirin EC 81 MG EC tablet Take 81 mg by mouth nightly      latanoprost (XALATAN) 0.005 % ophthalmic solution Place 0.005 drops into both eyes nightly  6     No current facility-administered medications for this visit.       Social History     Socioeconomic History    Marital status:      Spouse name: Not on file    Number of children: Not on file    Years of education: Not on file    Highest education level: Not on file   Occupational History    Occupation: retired   Social Needs    Financial resource strain: Not on file    Food insecurity     Worry: Not on file     Inability: Not on file   Doist needs     Medical: Not on file     Non-medical: Not on file   Tobacco Use    Smoking status: Never Smoker    Smokeless tobacco: Never Used   Substance and Sexual Activity    Alcohol use: No    Drug use: Never    Sexual activity: Not on file   Lifestyle    Physical activity     Days per week: Not on file     Minutes per session: Not on file    Stress: Not on file   Relationships    Social connections     Talks on phone: Not on file     Gets together: Not on file     Attends [employer] service: Not on file     Active member of club or organization: Not on file     Attends meetings of clubs or organizations: Not on file     Relationship status: Not on file    Intimate partner violence     Fear of current or ex partner: Not on file     Emotionally abused: Not on file Physically abused: Not on file     Forced sexual activity: Not on file   Other Topics Concern    Not on file   Social History Narrative     8 years second marriage    He has 2 daughters    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 Physical Examination:  /78   Pulse 70   Ht 5' 6\" (1.676 m)   Wt 192 lb (87.1 kg)   BMI 30.99 kg/m²   Physical Exam  Vitals signs reviewed. Constitutional:       Appearance: He is well-developed. Neck:      Vascular: No carotid bruit or JVD. Cardiovascular:      Rate and Rhythm: Normal rate and regular rhythm. Heart sounds: Normal heart sounds. No murmur. No friction rub. No gallop. Pulmonary:      Effort: Pulmonary effort is normal. No respiratory distress. Breath sounds: Normal breath sounds. No wheezing or rales. Abdominal:      General: There is no distension. Tenderness: There is no abdominal tenderness. Lymphadenopathy:      Cervical: No cervical adenopathy. Skin:     General: Skin is warm and dry. ASSESSMENT:     Diagnosis Orders   1. Coronary artery disease involving native coronary artery of native heart, angina presence unspecified     2. Hx of CABG     3. Mixed hyperlipidemia     4. Chest pain, unspecified type     5. Coronary artery disease involving native coronary artery of native heart without angina pectoris     6. Obstructive sleep apnea     7. Hypercholesterolemia         PLAN:  No orders of the defined types were placed in this encounter. No orders of the defined types were placed in this encounter. 1. Continue present medications  2. Laboratory studies as ordered  3.  Recommend follow-up assessment in 6 months    Return in about 6 months (around 2/10/2021) for return to Dr. Josue Reagan only. Mi Major MD 8/10/2020 3:23 PM CDT    [facility]      Thisdictation was generated by voice recognition computer software. Although all attempts are made to edit the dictation for accuracy, there may be errors in the transcription that are not intended.

## 2020-08-24 RX ORDER — METOPROLOL SUCCINATE 25 MG/1
12.5 TABLET, EXTENDED RELEASE ORAL DAILY
Qty: 15 TABLET | Refills: 5 | Status: SHIPPED | OUTPATIENT
Start: 2020-08-24 | End: 2021-08-26 | Stop reason: SDUPTHER

## 2020-08-31 ENCOUNTER — TELEPHONE (OUTPATIENT)
Dept: CARDIOLOGY | Age: 75
End: 2020-08-31

## 2020-08-31 NOTE — TELEPHONE ENCOUNTER
Patients PCP advised that wife call office and report HGB and RBC from recent labs. HGB 18 and RBC 6. Advised they were both WNL. She said PCP is going to send him to hematologist to figure out why they are increasing. She just wanted Dr. Purvi Pozo to be aware.

## 2020-09-02 ENCOUNTER — TELEPHONE (OUTPATIENT)
Dept: CARDIOLOGY | Age: 75
End: 2020-09-02

## 2020-09-02 NOTE — TELEPHONE ENCOUNTER
Patients wife called to report that he was having chest pain last night that was also present in his back. His BP got to 170/120 and within 10 minutes it had gone down to 159/83. He [facility] ER Saturday with similar issues and they didn't find anything cardiac related. Today he is doing fine with BP of 107/62. Advised if he has more chest pain to go to ED. She voiced understanding. Sending to you so you can discuss with Dr. Criselda Greer.

## 2020-09-16 ENCOUNTER — TELEPHONE (OUTPATIENT)
Dept: CARDIOLOGY | Age: 75
End: 2020-09-16

## 2020-09-16 NOTE — TELEPHONE ENCOUNTER
9/16 Inez Thornton from the pt PCP called, said she faxed some labs over, the pt has some elevated labs, wanted to make sure we are aware. The pt also has some testing done at [facility] due to pain, I will scan the labs in so it can be viewed. I have sent a fax requesting records from [facility] for the past week.

## 2021-03-23 ENCOUNTER — TELEPHONE (OUTPATIENT)
Dept: CARDIOLOGY CLINIC | Age: 76
End: 2021-03-23

## 2021-03-23 DIAGNOSIS — R07.9 CHEST PAIN IN ADULT: ICD-10-CM

## 2021-03-23 DIAGNOSIS — R06.02 SOB (SHORTNESS OF BREATH): Primary | ICD-10-CM

## 2021-03-23 NOTE — TELEPHONE ENCOUNTER
Patients wife called to report that for the past month patient has had issues with SOB when he bends over to tie his shoes or when he uses stairs. Denies any swelling. Next appt 6/17/21. She wanted to know if he needed to be seen sooner.  She is requesting call back at her number [phone]

## 2021-03-25 NOTE — TELEPHONE ENCOUNTER
Spoke with patient he is agreeable to stress test. Says he can walk on a treadmill for several minutes at a time with incline. Nuc treadmill  ordered. Patient given instructions below and number to call to schedule. [place] at the [address] [facility] and [address] [facility] located on the first floor of [place] [address] through hospital main entrance and turn immediately to your left. Patient's contact number:  [phone] (home) [phone] (work)     Tilford Rodriguez (regadenoson injection) is a prescription drug given through an IV line that increases blood flow through the arteries of the heart during a cardiac nuclear stress test.     There are two parts to a Lexiscan stress test: the rest portion and the exercise portion. For the rest portion, a radioactive tracer is injected into your arm through the IV. After 30 to 60 minutes, the process of imaging will begin. A nuclear camera will be placed on your chest area and images are taken for the next 15 to 20 minutes. For the exercise portion, a nurse will attach EKG electrodes to your chest to monitor your heart rate. The drug Eveleen Trinity is administered to simulate stress on the heart. Your heart rhythm will then be monitored for the next few minutes. Your blood pressure will also be monitored throughout the exercise portion. [place] through the exercise portion, a second round of radioactive tracer is injected into your body. Your heart rate and EKG will be monitored for another few minutes after administering the drug. Test Preparation:     Bring a list of your current medications. Do not take any of your medications the morning of the test, but bring all morning medications with you as you will take them after the stress portion of the test is completed.  Do not eat Bananas 24 hours prior to test.     No caffeine 24 hours prior to the testing.   This includes: coffee, pop/soda, chocolate, cold medications, etc.  Any product that might contain caffeine.  No nicotine or alcohol 12 hours prior to your test.    Nothing to eat or drink 6-8 hours prior to appointment time. It is okay to drink small amounts of water during the four hours prior to the test.   Nitroglycerin patches must be taken off 1 hour before testing.  Wear comfortable clothing.  Please refrain from any strenuous exercise or activities the day before your test, or the day of your test.   The Nuclear Lexiscan Stress test takes about 2 ½ to 3 hours to complete. If for any reason you are unable to keep this appointment, please contact Outpatient Scheduling, [phone], as soon as possible to reschedule.

## 2021-03-29 ENCOUNTER — TELEPHONE (OUTPATIENT)
Dept: NEUROLOGY | Age: 76
End: 2021-03-29

## 2021-03-29 RX ORDER — MEMANTINE HYDROCHLORIDE 10 MG/1
10 TABLET ORAL 2 TIMES DAILY
Qty: 60 TABLET | Refills: 11 | Status: SHIPPED | OUTPATIENT
Start: 2021-03-29 | End: 2021-04-30

## 2021-03-29 NOTE — TELEPHONE ENCOUNTER
Requested Prescriptions     Pending Prescriptions Disp Refills    memantine (NAMENDA) 10 MG tablet 60 tablet 11     Sig: Take 1 tablet by mouth 2 times daily       Last Office Visit: 7/21/2020  Next Office Visit: Visit date not found  Last Medication Refill: 3/2/2020 11 refills

## 2021-03-29 NOTE — TELEPHONE ENCOUNTER
====================  CCU MIDNIGHT ROUNDS  ====================    EFREN MEMBRENO  [id number]  Patient is a 48y old  Male who presents with a chief complaint of cardiac arrest (22 Jun 2018 03:47)    ====================  SUMMARY:  ====================  49 y/o PMH of Asthma, HTN, and hx of polysubstance abuse (including cocaine, alcohol, marijuana) coming in after witnessed cardiac arrest by EMS now w/ confirmed anoxic brain injury      ====================  NEW EVENTS:  ====================    Patient febrile to 101.2 at 5pm. GOC conversation had. Patient with a wife who was  religiously, but not legally. Aunt (who is technicaly the pts surrogate) and the wife have differing views on GOC. GOC ongoing.    ====================  VITALS (Last 12 hrs):  ====================    T(C): 37 (06-25-18 @ 22:30), Max: 38.6 (06-25-18 @ 16:00)  T(F): 98.6 (06-25-18 @ 22:30), Max: 101.5 (06-25-18 @ 16:00)  HR: 120 (06-25-18 @ 22:30) (109 - 130)  BP: --  BP(mean): --  ABP: 104/72 (06-25-18 @ 22:30) (104/72 - 158/82)  ABP(mean): 82 (06-25-18 @ 22:30) (82 - 104)  RR: 26 (06-25-18 @ 22:30) (16 - 31)  SpO2: 96% (06-25-18 @ 22:30) (96% - 100%)  Wt(kg): --  CVP(mm Hg): 7 (06-25-18 @ 22:30) (6 - 15)  CVP(cm H2O): --  CO: --  CI: --  PA: --  PA(mean): --  PCWP: --  SVR: --  PVR: --    I&O's Summary    24 Jun 2018 07:01  -  25 Jun 2018 07:00  --------------------------------------------------------  IN: 2310.9 mL / OUT: 1680 mL / NET: 630.9 mL    25 Jun 2018 07:01  -  25 Jun 2018 23:52  --------------------------------------------------------  IN: 2021.6 mL / OUT: 2630 mL / NET: -608.4 mL        Mode: AC/ CMV (Assist Control/ Continuous Mandatory Ventilation)  RR (machine): 24  TV (machine): 500  FiO2: 35  PEEP: 5  ITime: 1  MAP: 12  PIP: 20      ====================  NEW LABS:  ====================                          13.1   14.9  )-----------( 289      ( 25 Jun 2018 05:26 )             40.3     06-25    141  |  100  |  25<H>  ----------------------------<  165<H>  3.9   |  29  |  1.31<H>    Ca    9.6      25 Jun 2018 18:02  Phos  3.5     06-25  Mg     2.1     06-25    TPro  6.8  /  Alb  3.8  /  TBili  0.2  /  DBili  x   /  AST  75<H>  /  ALT  61<H>  /  AlkPhos  96  06-25    PT/INR - ( 25 Jun 2018 05:26 )   PT: 10.4 sec;   INR: 0.96 ratio         PTT - ( 25 Jun 2018 05:26 )  PTT:58.1 sec  Creatine Kinase, Serum: 445 U/L <H> (06-25-18 @ 05:29)    CKMB Units: 6.3 ng/mL (06-25 @ 05:29)    ABG - ( 25 Jun 2018 04:59 )  pH, Arterial: 7.43  pH, Blood: x     /  pCO2: 44    /  pO2: 112   / HCO3: 28    / Base Excess: 3.7   /  SaO2: 99                    ====================  A/P:  ====================  49 y/o PMH of Asthma, HTN, and hx of substance abuse including cocaine, alcohol, marijuana coming in after witnessed cardiac arrest by EMS now w/ confirmed anoxic brain injury    CV:  1) Cardiac arrest   - no longer on hypothermia  - c/w central line    2) ACS, ST depressions on lateral leads - -bedside echo showed severe LV systolic dysfunction  - cont on ASA, Brilinta and heparin drip; GOC discussion ongoing   - c/w Atorvastatin 80  - will likely hold off on cath given hx of anoxic brain injury  - f/u TTE    3) Hypertension   -SBP in the 180's on admission. Now WNL  -continue with Isordil, Hydralazine, and Lasix  -continue monitoring,  off nitro drip      Respiratory - hx of asthma  -intubated   -patient with evidence of pulmonary edema  -continue with diuresis with lasix 80 q8  -Trend I/Os, monitor ABGs, daily weights    Renal:  -LALITA likely prerenal after cardiac arrest  -trend Scr on BMP and monitor I/Os    Neuro: -6/23 EEG provides evidence of a severe diffuse encephalopathy, with a generalized burst suppression pattern seen throughout the recording.  No epileptiform abnormalities were recorded  -will assess mentation after clinical condition improves     Endo: lipid profile shows Cholesterol 134, , HDL 45    GI:   - no active issues  - Hypoactive BS improving 6/23 started Jevity TF with goal rate of 60    ID:  - No active issues    Heme: mild neutrophil predominant leukocytosis; Hgb stable  - no active issues  - will trend CBC    DVT ppx:  - HSQ    Ethics:   - GOC ongoing Patient called stating he would like higher pressures on his CPAP machine. I have scheduled patient with f/u with Shepherd to evaluate this.

## 2021-04-13 ENCOUNTER — HOSPITAL ENCOUNTER (OUTPATIENT)
Dept: NUCLEAR MEDICINE | Age: 76
Discharge: HOME | End: 2021-04-15
Payer: MEDICARE

## 2021-04-13 DIAGNOSIS — R07.9 CHEST PAIN IN ADULT: ICD-10-CM

## 2021-04-13 DIAGNOSIS — R06.02 SOB (SHORTNESS OF BREATH): ICD-10-CM

## 2021-04-13 LAB
LV EF: 50 %
LVEF MODALITY: NORMAL

## 2021-04-13 PROCEDURE — A9500 TC99M SESTAMIBI: HCPCS | Performed by: INTERNAL MEDICINE

## 2021-04-13 PROCEDURE — 3430000000 HC RX DIAGNOSTIC RADIOPHARMACEUTICAL: Performed by: INTERNAL MEDICINE

## 2021-04-13 PROCEDURE — 6360000002 HC RX W HCPCS: Performed by: INTERNAL MEDICINE

## 2021-04-13 PROCEDURE — 93017 CV STRESS TEST TRACING ONLY: CPT

## 2021-04-13 RX ADMIN — TETRAKIS(2-METHOXYISOBUTYLISOCYANIDE)COPPER(I) TETRAFLUOROBORATE 30 MILLICURIE: 1 INJECTION, POWDER, LYOPHILIZED, FOR SOLUTION INTRAVENOUS at 12:06

## 2021-04-13 RX ADMIN — TETRAKIS(2-METHOXYISOBUTYLISOCYANIDE)COPPER(I) TETRAFLUOROBORATE 10 MILLICURIE: 1 INJECTION, POWDER, LYOPHILIZED, FOR SOLUTION INTRAVENOUS at 12:06

## 2021-04-13 RX ADMIN — REGADENOSON 0.4 MG: 0.08 INJECTION, SOLUTION INTRAVENOUS at 10:14

## 2021-04-30 ENCOUNTER — OFFICE VISIT (OUTPATIENT)
Dept: NEUROLOGY | Age: 76
End: 2021-04-30
Payer: MEDICARE

## 2021-04-30 ENCOUNTER — TELEPHONE (OUTPATIENT)
Dept: CARDIOTHORACIC SURGERY | Age: 76
End: 2021-04-30

## 2021-04-30 VITALS
DIASTOLIC BLOOD PRESSURE: 64 MMHG | HEIGHT: 66 IN | SYSTOLIC BLOOD PRESSURE: 104 MMHG | WEIGHT: 192 LBS | BODY MASS INDEX: 30.86 KG/M2 | HEART RATE: 75 BPM

## 2021-04-30 DIAGNOSIS — G47.33 OBSTRUCTIVE SLEEP APNEA: Primary | ICD-10-CM

## 2021-04-30 DIAGNOSIS — G47.52 REM SLEEP BEHAVIOR DISORDER: ICD-10-CM

## 2021-04-30 DIAGNOSIS — G31.84 MILD COGNITIVE IMPAIRMENT: ICD-10-CM

## 2021-04-30 DIAGNOSIS — Z99.89 CPAP (CONTINUOUS POSITIVE AIRWAY PRESSURE) DEPENDENCE: ICD-10-CM

## 2021-04-30 PROCEDURE — 99214 OFFICE O/P EST MOD 30 MIN: CPT | Performed by: PHYSICIAN ASSISTANT

## 2021-04-30 PROCEDURE — 1123F ACP DISCUSS/DSCN MKR DOCD: CPT | Performed by: PHYSICIAN ASSISTANT

## 2021-04-30 PROCEDURE — 1036F TOBACCO NON-USER: CPT | Performed by: PHYSICIAN ASSISTANT

## 2021-04-30 PROCEDURE — G8417 CALC BMI ABV UP PARAM F/U: HCPCS | Performed by: PHYSICIAN ASSISTANT

## 2021-04-30 PROCEDURE — G8427 DOCREV CUR MEDS BY ELIG CLIN: HCPCS | Performed by: PHYSICIAN ASSISTANT

## 2021-04-30 PROCEDURE — 4040F PNEUMOC VAC/ADMIN/RCVD: CPT | Performed by: PHYSICIAN ASSISTANT

## 2021-04-30 RX ORDER — POTASSIUM CHLORIDE 1.5 G/1.77G
10 POWDER, FOR SOLUTION ORAL 3 TIMES DAILY
Status: ON HOLD | COMMUNITY
End: 2021-11-16

## 2021-04-30 RX ORDER — HYDROXYUREA 500 MG/1
500 CAPSULE ORAL 2 TIMES DAILY
COMMUNITY

## 2021-04-30 NOTE — PROGRESS NOTES
Sleep terrors     Unstable angina (HCC)        Past Surgical History:   Procedure Laterality Date    APPENDECTOMY      CORONARY ARTERY BYPASS GRAFT      CORONARY ARTERY BYPASS GRAFT      aortofemoral    DIAGNOSTIC CARDIAC CATH LAB PROCEDURE      HERNIA REPAIR      HIP ARTHROPLASTY Right     LITHOTRIPSY         Recent Hospitalizations  ·     Significant Injuries  ·     Family History   Problem Relation Age of Onset    No Known Problems Mother     Heart Disease Father        Social History  Social History     Tobacco Use   Smoking Status Never Smoker   Smokeless Tobacco Never Used     Social History     Substance and Sexual Activity   Alcohol Use No     Social History     Substance and Sexual Activity   Drug Use Never         Current Outpatient Medications   Medication Sig Dispense Refill    hydroxyurea (HYDREA) 500 MG chemo capsule Take by mouth 2 times daily      potassium chloride (KLOR-CON) 20 MEQ packet Take 10 mEq by mouth 3 times daily      metoprolol succinate (TOPROL XL) 25 MG extended release tablet Take 0.5 tablets by mouth daily 15 tablet 5    clonazePAM (KLONOPIN) 0.5 MG tablet Take 0.5 mg by mouth daily.  memantine (NAMENDA) 10 MG tablet Take 10 mg by mouth nightly      atorvastatin (LIPITOR) 20 MG tablet Take 1 tablet by mouth daily 90 tablet 3    tamsulosin (FLOMAX) 0.4 MG capsule Take 0.4 mg by mouth daily      zinc gluconate 50 MG tablet Take 50 mg by mouth daily      Coenzyme Q10 (Q-10 CO-ENZYME PO) Take 200 mg by mouth daily      zinc 50 MG TABS tablet Take 50 mg by mouth daily      Probiotic Product (PROBIOTIC DAILY PO) Take 0.5 mg by mouth      Melatonin 10 MG TABS Take by mouth nightly       clonazePAM (KLONOPIN) 0.5 MG tablet Take 0.5 mg by mouth nightly.  Indications: patient takes 1/2 tablet      nitroGLYCERIN (NITROSTAT) 0.4 MG SL tablet Place 1 tablet under the tongue as needed for Chest pain 25 tablet 3    aspirin EC 81 MG EC tablet Take 81 mg by mouth nightly  latanoprost (XALATAN) 0.005 % ophthalmic solution Place 0.005 drops into both eyes nightly  6    isosorbide mononitrate (IMDUR) 30 MG extended release tablet Take 1 tablet by mouth nightly 30 tablet 1     No current facility-administered medications for this visit. Allergies:  Patient has no known allergies. REVIEW OF SYSTEMS     Constitutional: []? Fever []? Sweats []? Chills []? Recent Injury   [x]? Denies all unless marked  HENT:[]? Headache  []? Head Injury  []? Sore Throat  []? Ear Pain  [x]? Dizziness []? Hearing Loss   [x]? Denies all unless marked  Musculoskeletal: []? Arthralgia  []? Myalgias []? Muscle cramps  []? Muscle twitches   [x]? Denies all unless marked   Spine:  []? Neck pain  []? Back pain  []? Sciaticia  [x]? Denies all unless marked  Neurological:[]? Visual Disturbance []? Double Vision []? Slurred Speech []? Trouble swallowing  []? Vertigo []? Tingling []? Numbness []? Weakness []? Loss of Balance   []? Loss of Consciousness [x]? Memory Loss []? Seizures  [x]? Denies all unless marked  Psychiatric/Behavioral:[]? Depression []? Anxiety  [x]? Denies all unless marked  Sleep: []? Insomnia [x]? Sleep Disturbance []? Snoring []? Restless Legs []? Daytime Sleepiness [x]? Sleep Apnea  [x]? Denies all unless marked    The MA has completed the ROS with the patient. I have reviewed it in its' entirety with the patient and agree with the documentation.      PHYSICAL EXAM  /64   Pulse 75   Ht 5' 6\" (1.676 m)   Wt 192 lb (87.1 kg)   BMI 30.99 kg/m²      Constitutional   Alert in NAD, well developed, pleasant and cooperative with exam; body habitus obese as indicated by BMI  HEENT- Conjunctiva normal.  No scars, masses, or lesions over external nose or ears, hearing intact, no neck masses noted, no jugular vein distension, no bruit  Cardiac- Regular rate and rhythm  Pulmonary- Clear to auscultation, good expansion, normal effort without use of accessory muscles  Musculoskeletal  No significant wasting of muscles noted, no bony deformities  Extremities - No clubbing, cyanosis or edema  Skin  Warm, dry, and intact. No rash, erythema, or pallor  Psychiatric  Mood, affect, and behavior appear normal      Neurologic:  Extraocular movements are intact without nystagmus. PERRL. Visual fields are full to confrontation. Facial movements are symmetrical and normal.  Speech is precise. Extremity strength is normal in both uppers and lowers. Deep tendon reflexes are intact and symmetrical.  Rapid alternating movements are unimpaired. Finger-to-nose testing is performed well, without dysmetria. Gait is normal.    I reviewed the following studies:       []  :  Clinical laboratory test results     []  :  Radiology reports                    [x]  :  Review and summarization of medical records and/or obtain medical records        []  :  Previous/recent polysomnogram report(s)     []  :  [place] Sleepiness Scale       [x]  :  Compliance download: The auto CPAP is set at a pressure range of 6cm to 20cm. Compliance download shows that he uses device: 97% of the time;  percentage of days with usage >=4 hours: 93%. AHI: 1.1    Assessment:       ICD-10-CM    1. Obstructive sleep apnea  G47.33    2. REM sleep behavior disorder  G47.52    3. Mild cognitive impairment  G31.84    4. CPAP (continuous positive airway pressure) dependence  Z99.89           [x]  :  Stable-RSBD     [x]  :  Improved-Mild Cognitive impairment                      [x]  :  Well controlled-ANGE            []  :  Resolving     []  :  Resolved     []  :  Inadequately controlled     []  :  Worsening     []  :  Additional workup planned    Patient is compliant and benefiting from therapy as indicated by compliance evaluation and patient report. Plan:     No orders of the defined types were placed in this encounter.       1.   Previously or presently advised of the etiology,  pathophysiology, diagnosis, treatment options, and risks of untreated ANGE. Risks may include, but are not limited to  hypertension, coronary artery disease, diabetes, stroke, weight gain, impaired cognition, daytime somnolence, and motor vehicle accidents. Advised to abstain from driving or operating heavy machinery when drowsy and the use of respiratory suppressants. Discussed diagnostic studies and potential treatment plan. Will evaluate for clinical benefit and and compliance during a 30 day period within the preceding 90 days PRN. 2.  The following educational material has been included in this visit after visit summary for your review: ANGE/PAP guidelines/memory-Discussed with the patient and all questions fully answered. 3.  Continue CPAP  4. Continue clonazepam per Dr. Sara Graham; he is prescribed 0.5 mg but is only taking 1/2  5. Continue Namenda 10 mg and increase to BID as prescribed  6.   Follow up with Dr. Lo Navarrete in 6 months

## 2021-04-30 NOTE — TELEPHONE ENCOUNTER
Caitlin,  Can you please get this pt a sooner appt . ... see note: Wife called stating pt went out to mow on the rider and came back to the house just a couple minutes later saying he was feeling dizzy and like throwing up. Said bp was 159/139 so she retook it and it was 133/71 then just did again and it is now 116/64 and he is feeling fine. She reports this has happened a few times. Has an appt. End of May to see Dr Paulina Junior but feels he needs to be sooner.

## 2021-04-30 NOTE — PATIENT INSTRUCTIONS
Patient education: Sleep apnea in adults       INTRODUCTION  Normally during sleep, air moves through the throat and in and out of the lungs at a regular rhythm. In a person with sleep apnea, air movement is periodically diminished or stopped. There are two types of sleep apnea: obstructive sleep apnea and central sleep apnea. In obstructive sleep apnea, breathing is abnormal because of narrowing or closure of the throat. In central sleep apnea, breathing is abnormal because of a change in the breathing control and rhythm. Sleep apnea is a serious condition that can affect a person's ability to safely perform normal daily activities and can affect long term health. Approximately 25 percent of adults are at risk for sleep apnea of some degree. Men are more commonly affected than women. Other risk factors include middle and older age, being overweight or obese, and having a small mouth and throat. This topic review focuses on the most common type of sleep apnea in adults, obstructive sleep apnea (ANGE). HOW SLEEP APNEA OCCURS  The throat is surrounded by muscles that control the airway for speaking, swallowing, and breathing. During sleep, these muscles are less active, and this causes the throat to narrow. In most people, this narrowing does not affect breathing. In others, it can cause snoring, sometimes with reduced or completely blocked airflow. A completely blocked airway without airflow is called an obstructive apnea. Partial obstruction with diminished airflow is called a hypopnea. A person may have apnea and hypopnea during sleep. Insufficient breathing due to apnea or hypopnea causes oxygen levels to fall and carbon dioxide to rise. Because the airway is blocked, breathing faster or harder does not help to improve oxygen levels until the airway is reopened. Typically, the obstruction requires the person to awaken to activate the upper airway muscles.  Once the airway is opened, the person then takes several deep breaths to catch up on breathing. As the person awakens, he or she may move briefly, snort or snore, and take a deep breath. Less frequently, a person may awaken completely with a sensation of gasping, smothering, or choking. If the person falls back to sleep quickly, he or she will not remember the event. Many people with sleep apnea are unaware of their abnormal breathing in sleep, and all patients underestimate how often their sleep is interrupted. Awakening from sleep causes sleep to be unrefreshing and causes fatigue and daytime sleepiness. Anatomic causes of obstructive sleep apnea   Most patients have ANGE because of a small upper airway. As the bones of the face and skull develop, some people develop a small lower face, a small mouth, and a tongue that seems too large for the mouth. These features are genetically determined, which explains why ANGE tends to cluster in families. Obesity is another major factor. Tonsil enlargement can be an important cause, especially in children. SLEEP APNEA SYMPTOMS  The main symptoms of ANGE are loud snoring, fatigue, and daytime sleepiness. However, some people have no symptoms. For example, if the person does not have a bed partner, he or she may not be aware of the snoring. Fatigue and sleepiness have many causes and are often attributed to overwork and increasing age. As a result, a person may be slow to recognize that they have a problem. A bed partner or spouse often prompts the patient to seek medical care. Other symptoms may include one or more of the following:  ?Restless sleep  ? Awakening with choking, gasping, or smothering  ? Morning headaches, dry mouth, or sore throat  ? Waking frequently to urinate  ? Awakening unrested, groggy  ? Low energy, difficulty concentrating, memory impairment    Risk factors  Certain factors increase the risk of sleep apnea.   ?Increasing age [de-identified] ANGE occurs at all ages, but it is more common in middle and older age adults. ?Male sex  ANGE is two times more common in men, especially in middle age. ?Obesity  The more obese a person is, the more likely he or she is to have ANGE. ? Sedation from medication or alcohol  This interferes with the ability to awaken from sleep and can lengthen periods of apnea (no breathing), with potentially dangerous consequences. ? Abnormality of the airway. SLEEP APNEA CONSEQUENCES  Complications of sleep apnea can include daytime sleepiness and difficulty concentrating. The consequence of this is an increased risk of accidents and errors in daily activities. Studies have shown that people with severe ANGE are more than twice as likely to be involved in a motor vehicle accident as people without these conditions. People with ANGE are encouraged to discuss options for driving, working, and performing other high-risk tasks with a healthcare provider. In addition, people with untreated ANGE may have an increased risk of cardiovascular problems such as high blood pressure, heart attack, abnormal heart rhythms, or stroke. This risk may be due to changes in the heart rate and blood pressure that occur during sleep. SLEEP APNEA DIAGNOSIS  The diagnosis of ANGE is best made by a knowledgeable sleep medicine specialist who has an understanding of the individual's health issues. The diagnosis is usually based upon the person's medical history, physical examination, and testing, including:  ? A complaint of snoring and ineffective sleep  ? Neck size (greater than 16 inches in men or 14 inches in women) is associated with an increased risk of sleep apnea  ? A small upper airway: difficulty seeing the throat because of a tongue that is large for the mouth  ? High blood pressure, especially if it is resistant to treatment  ? If a bed partner has observed the patient during episodes of stopped breathing (apnea), choking, or gasping during sleep, there is a strong possibility of sleep apnea.     Testing is pressure)  device uses an air-tight attachment to the nose, typically a mask, connected to a tube and a blower which generates the pressure. Devices that fit comfortably into the nasal opening, rather than over the nose, are also available. CPAP should be used any time the person sleeps (day or night). The CPAP device is usually used for the first time in the sleep lab, where a technician can adjust the pressure and select the best equipment to keep the airway open. Alternatively, an auto device with a self-adjusting pressure feature, provided with proper education and training, can get treatment started without another sleep test. While the treatment may seem uncomfortable, noisy, or bulky at first, most people accept the treatment after experiencing better sleep. However, difficulty with mask comfort and nasal congestion prevent up to 50 percent of people from using the treatment on a regular basis. Continued follow up with a healthcare provider helps to ensure that the treatment is effective and comfortable. Information from the CPAP machine is often used by physicians, therapists, and insurers to track the success of treatment. CPAP can be delivered with different features to improve comfort and solve problems that may come up during treatment. Changes in treatment may be needed if symptoms do not improve or if the persons condition changes, such as a gain or loss of weight. Adjust sleep position  Adjusting sleep position (to stay off the back) may help improve sleep quality in people who have ANGE when sleeping on the back. However, this is difficult to maintain throughout the night and is rarely an adequate solution. Weight loss  Weight loss may be helpful for obese or overweight patients. Weight loss may be accomplished with dietary changes, exercise, and/or surgical treatment.  However, it can be difficult to maintain weight loss; the five-year success of non-surgical weight loss is only 5 percent, established. Annual visits are reasonable, with more frequent visits in between if new issues arise. The purpose of long-term follow-up is to assess usage and monitor for recurrent ANGE, new side effects, air leakage, and fluctuations in body weight. WHERE TO GET MORE INFORMATION  Your healthcare provider is the best source of information for questions and concerns related to your medical problem. Organizations  American [employer] Association  Provides information about sleep apnea to the public, publishes a newsletter, and serves as an advocate for people with the disorder. [place], [address]   James@[employer]. org   [employer]. org   Tel: [phone]   Fax: [facility] organization that works to [employer] and safety by promoting public understanding of sleep and sleep disorders. Supports sleep-related education, research, and advocacy; produces and distributes educational materials to the public and healthcare professionals; and offers postdoctoral fellowships and grants for sleep researchers. [address]   Constantine@[employer]. org   [employer]fer[employer].at. org   Tel: [phone]   Fax: [phone]    Important information:  Medicare/private insurance CPAP/BiPAP/APAP requirements:  Medicare/private insurance has specific requirements for PAP compliance that must be met during the first 90 days of use to continue coverage for CPAP/BiPAP/APAP  from day 91 and beyond. The policy requires that patients use a PAP device 4 hours per 24 hour period, at least 70% of the time over a 30 day period. This data must be downloaded as a report direct from the PAP devices. This is called a compliance download. Your PAP supplier will assist you in this matter.      Reminder:  Please bring a copy of the compliance download to your next office visit or have your supplier fax it to our office prior to your office visit. Note:  Where applicable, we will utilize PAP device efficiency reports, additional testing, and face-to-face  clinical evaluation subsequent to any treatment, changes in treatment, and continued treatment. Caution:  Please abstain from driving or engaging in other activities which may be hazardous in the presence of diminished alertness or daytime drowsiness. And avoid the use of sedatives or alcohol, which can worsen sleep apnea and daytime drowsiness. Mask suggestions:  -     Resmed Airfit N20 (Nasal) or F20 (Full face mask). They conform to your face, thus decreasing the potential for mask leakage. You might like the AirTouch F20(full face mask). It has a \"memory foam\" like cushion. The AirFit F30 is a smaller style full face mask designed to sit low on and cover less of your face for fewer facial marks. AirFit N30i has a top of the head tube with a nasal mask. AirFit P10 reported to be the most comfortable nasal pillow mask. Resmed Mirage FX reported to be the most comfortable nasal mask. Resmed Mirage Ferron reported to be the most comfortable hybrid mask. AirTouch N20-memory foam nasal mask. Respironics: You might also like to try a nasal mask called a Dreamwear nasal mask or the Dreamwear nasal pillow. Another suggestion is the [facility], it is a minimal contact full face mask. The [facility] incredible under the nose design makes it the only full face mask that won't cause red marks on the bridge of your nose when compared to other full face masks. The Dreamwear full face mask has a  soft feel, unique in-frame air-flow, and innovative air tube connection at the top of the head for the ultimate in sleep comfort. Comfort Gel Blue. Dreamwear gel pillows. [employer]: Brevida nasal pillow mask and Simplus FFM    The use of a memory foam CPAP pillow supports the head and neck throughout the night.           Keep Your Memory Sharp         Many factors can affect your ability to remembera hectic lifestyle, aging, stress, chronic disease, and certain medicines. But, there are steps you can take to sharpen your mind and help preserve your memory. Challenge Your Brain   Regularly challenging your mind may help keeps it in top shape. Good mental exercises include:   · Crossword puzzles. Use a dictionary if you need it; you will learn more that way. · Brainteasers: Try some! · Crafts, such as wood working and sewing   · [employer], such as gardening and building model airplanes   · Socializing: Visit old friends or join groups to meet new ones. · Reading   · Learning a new language   · Taking a class, whether it be art history or diaz chi   · Traveling: Experience the food, history, and culture of your destination   · Learning to use a computer   · Going to museums, the theater, or thought-provoking movies   · Changing things in your daily life, such as reversing your pattern in the grocery store or brushing your teeth using your nondominant hand   Use Memory Aids   There is no need to remember every detail on your own. These memory aids can help:   · Calendars and day planners   · Electronic organizers to store all sorts of helpful information. These devices can \"beep\" to remind you of appointments. · A book of days to record birthdays, anniversaries, and other occasions that occur on the same date every year   · Detailed \"to-do\" lists and strategically placed sticky notes   · Quick \"study\" sessions before a gathering, review who will be there so their names will be fresh in your mind. · Establish routines. For example, keep your keys, wallet, and umbrella in the same place all the time or take medicine with your 8:00 AM glass of juice   Live a Healthy Life   Many actions that will keep your body strong will do the same for your mind.  For example:   Talk to Your Doctor About Herbs and Supplements   Malnutrition and vitamin deficiencies can impair your mental function. For example, vitamin B12 deficiency can cause a range of symptoms, including confusion. But, what if your nutritional needs are being met? Can herbs and supplements still offer a benefit? Researchers have investigated a range of natural remedies, such as ginkgo , ginseng , and the supplement phosphatidylserine (PS). So far, though, the evidence is inconsistent as to whether these products can improve memory or thinking. If you are interested in taking herbs and supplements, talk to your doctor first because they may interact with other medicines that you are taking. Exercise Regularly   Among the many benefits of regular exercise are increased blood flow to the brain and decreased risk of certain diseases that can interfere with memory function. One study found that even moderate exercise has a beneficial effect. Examples of \"moderate\" exercise include:   · Playing 18 holes of golf once a week, without a cart   · Playing tennis twice a week   · Walking one mile per day   Manage Stress   It can be tough to remember what is important when your mind is cluttered. Make time for relaxation. Choose activities that calm you down, and make it routine. Manage Chronic Conditions   Side effects of high blood pressure , diabetes, and heart disease can interfere with mental function. Many of the lifestyle steps discussed here can help manage these conditions. Strive to eat a healthy diet, exercise regularly, get stress under control, and follow your doctor's advice for your condition. Minimize Medications   Talk to your doctor about the medicines that you take. Some may be unnecessary. Also, healthy lifestyle habits may lower the need for certain drugs.    Older adults have a particularly high risk of adverse effects from hypnotic drugs, including excessive sedation, cognitive impairment, delirium, night wandering, agitation, postoperative confusion, balance problems, and impaired performance of daily activities. An increased risk of falls with severe consequences, including traumatic brain injury and hip fracture, has been observed in association with both benzodiazepines and nonbenzodiazepines such as zolpidem. Patient education: Evaluating memory and thinking problems    Written by the doctors and editors at [facility]   What does \"evaluating memory and thinking problems\" mean?  It means checking your memory and thinking. An \"evaluation\" is another word for a check-up or test.  Your doctor might do an evaluation if you or your family has noticed that you are having problems with your memory or thinking, or doing daily activities. An evaluation can show:  ?If you have memory or thinking problems  It is normal for adults to have slight memory problems as they get older. But some people have memory or thinking problems that are more serious. ? The types of problems you have, and how serious they are  An evaluation might also show what's causing your problems. Different conditions can cause memory and thinking problems. Some people have only 1 evaluation. Other people have repeat evaluations to see if their problems get worse over time. What does an evaluation involve?  An evaluation involves different parts. You might have all of the parts in 1 day, or it might take a few days to do all the parts. The different parts include:  ?Talking with your doctor or nurse  Your doctor or nurse will talk with you and your family about your symptoms, behavior, and daily activities. He or she will ask about changes in your symptoms and behavior. He or she will also ask about your medical problems, medicines, and drug and alcohol use. ?A physical exam  ?Tests for your memory and thinking  The doctor or nurse will ask some questions to check your memory and thinking. For example, he or she might tell you 3 words and have you remember them for a few minutes.  You might also see another person for more detailed testing. ? Blood tests  These tests can check for other conditions that could be causing your symptoms. ? A CT or MRI scan of your brain  These are imaging tests that can create pictures of your brain. Not everyone needs a brain scan. Whether or not you have a brain scan depends on your symptoms and physical exam.  Why is it important to have an evaluation?  It's important to have an evaluation because some memory and thinking problems can be treated. After treatment, the symptoms will often get better. On the other hand, some memory or thinking problems can't be treated. Knowing this is helpful, too. For example, if you know that your problems might affect certain daily tasks or activities, then you can get help for those tasks or activities. Plus, if you know that your condition is not likely to improve, you and your family can make plans for the future.

## 2021-04-30 NOTE — PROGRESS NOTES

## 2021-05-03 ENCOUNTER — OFFICE VISIT (OUTPATIENT)
Dept: CARDIOLOGY CLINIC | Age: 76
End: 2021-05-03
Payer: MEDICARE

## 2021-05-03 VITALS
HEART RATE: 82 BPM | SYSTOLIC BLOOD PRESSURE: 132 MMHG | WEIGHT: 192 LBS | HEIGHT: 66 IN | DIASTOLIC BLOOD PRESSURE: 64 MMHG | BODY MASS INDEX: 30.86 KG/M2

## 2021-05-03 DIAGNOSIS — I73.9 CLAUDICATION (HCC): ICD-10-CM

## 2021-05-03 DIAGNOSIS — R00.2 PALPITATIONS: Primary | ICD-10-CM

## 2021-05-03 DIAGNOSIS — I20.8 OTHER FORMS OF ANGINA PECTORIS (HCC): ICD-10-CM

## 2021-05-03 DIAGNOSIS — Z95.1 HX OF CABG: ICD-10-CM

## 2021-05-03 DIAGNOSIS — I25.10 CORONARY ARTERY DISEASE INVOLVING NATIVE CORONARY ARTERY OF NATIVE HEART WITHOUT ANGINA PECTORIS: ICD-10-CM

## 2021-05-03 PROCEDURE — 1036F TOBACCO NON-USER: CPT | Performed by: INTERNAL MEDICINE

## 2021-05-03 PROCEDURE — 93246 EXT ECG>7D<15D RECORDING: CPT | Performed by: INTERNAL MEDICINE

## 2021-05-03 PROCEDURE — 99214 OFFICE O/P EST MOD 30 MIN: CPT | Performed by: INTERNAL MEDICINE

## 2021-05-03 PROCEDURE — G8417 CALC BMI ABV UP PARAM F/U: HCPCS | Performed by: INTERNAL MEDICINE

## 2021-05-03 PROCEDURE — G8427 DOCREV CUR MEDS BY ELIG CLIN: HCPCS | Performed by: INTERNAL MEDICINE

## 2021-05-03 PROCEDURE — 4040F PNEUMOC VAC/ADMIN/RCVD: CPT | Performed by: INTERNAL MEDICINE

## 2021-05-03 PROCEDURE — 1123F ACP DISCUSS/DSCN MKR DOCD: CPT | Performed by: INTERNAL MEDICINE

## 2021-05-03 RX ORDER — POTASSIUM CITRATE 10 MEQ/1
TABLET, EXTENDED RELEASE ORAL
COMMUNITY

## 2021-05-03 ASSESSMENT — ENCOUNTER SYMPTOMS
EYES NEGATIVE: 1
DIARRHEA: 0
NAUSEA: 0
VOMITING: 0
SHORTNESS OF BREATH: 0
RESPIRATORY NEGATIVE: 1
GASTROINTESTINAL NEGATIVE: 1

## 2021-05-03 NOTE — PROGRESS NOTES
[facility] Progress Note                            Date:  5/3/2021  Patient: Heydi Inman  Age:  67 y.o., 1945      Reason for evaluation:         SUBJECTIVE:    Returns today follow-up assessment follow-up for ischemic heart disease previous CABG hyperlipidemia also complains of intermittent dizziness occasionally associated with blurred vision primarily when he autumn or bends over. Denies anginal chest pain or limiting dyspnea. He is not worn a heart monitor. He also complains of bilateral lower extremity discomfort in his calves after walking about 100 yards of both legs equally effective stable over the past 12 months. No previous assessment regarding this. He does have a history of varicose veins. Blood pressure 132/64 heart 82. Review of Systems   Constitutional: Negative. Negative for chills, fever and unexpected weight change. HENT: Negative. Eyes: Negative. Respiratory: Negative. Negative for shortness of breath. Cardiovascular: Negative. Negative for chest pain. Gastrointestinal: Negative. Negative for diarrhea, nausea and vomiting. Endocrine: Negative. Genitourinary: Negative. Musculoskeletal: Negative. Skin: Negative. Neurological: Negative. All other systems reviewed and are negative. OBJECTIVE:     /64   Pulse 82   Ht 5' 6\" (1.676 m)   Wt 192 lb (87.1 kg)   BMI 30.99 kg/m²     Labs:   CBC: No results for input(s): WBC, HGB, HCT, PLT in the last 72 hours. BMP:No results for input(s): NA, K, CO2, BUN, CREATININE, LABGLOM, GLUCOSE in the last 72 hours. BNP: No results for input(s): BNP in the last 72 hours. PT/INR: No results for input(s): PROTIME, INR in the last 72 hours. APTT:No results for input(s): APTT in the last 72 hours. CARDIAC ENZYMES:No results for input(s): CKTOTAL, CKMB, CKMBINDEX, TROPONINI in the last 72 hours.   FASTING LIPID PANEL:  Lab Results   Component Value Date    HDL 43 04/27/2020    7668 [employer] 59 04/27/2020    TRIG 146 04/27/2020     LIVER PROFILE:No results for input(s): AST, ALT, LABALBU in the last 72 hours. Past Medical History:   Diagnosis Date    Acute sinusitis     Arthritis     Atherosclerosis of coronary artery bypass graft(s) without angina pectoris     Bone pain     Cardiovascular disease     Chest pain     pressure    CPAP (continuous positive airway pressure) dependence 04/2018    8cm    Diarrhea     Dyspnea on exertion     ED (erectile dysfunction)     Glaucoma     pre glaucoma    Hyperlipidemia     Hypertension     Hypoglycemia, unspecified     Left shoulder pain     Low back pain     Memory disorder     Nocturia     Obstructive sleep apnea     AHI:  47 per [facility] in [place], 3/2018    Other fatigue     Polycythemia     REM sleep behavior disorder     Sleep terrors     Unstable angina (HCC)      Past Surgical History:   Procedure Laterality Date    APPENDECTOMY      CORONARY ARTERY BYPASS GRAFT      CORONARY ARTERY BYPASS GRAFT      aortofemoral    DIAGNOSTIC CARDIAC CATH LAB PROCEDURE      HERNIA REPAIR      HIP ARTHROPLASTY Right     LITHOTRIPSY       Family History   Problem Relation Age of Onset    No Known Problems Mother     Heart Disease Father      No Known Allergies  Current Outpatient Medications   Medication Sig Dispense Refill    potassium citrate (UROCIT-K) 10 MEQ (1080 MG) extended release tablet Take by mouth 3 times daily (with meals)      hydroxyurea (HYDREA) 500 MG chemo capsule Take by mouth 2 times daily      metoprolol succinate (TOPROL XL) 25 MG extended release tablet Take 0.5 tablets by mouth daily 15 tablet 5    clonazePAM (KLONOPIN) 0.5 MG tablet Take 0.5 mg by mouth daily.       memantine (NAMENDA) 10 MG tablet Take 10 mg by mouth nightly      atorvastatin (LIPITOR) 20 MG tablet Take 1 tablet by mouth daily 90 tablet 3    tamsulosin (FLOMAX) 0.4 MG capsule Take 0.4 mg by mouth daily      Coenzyme Q10 (Q-10 CO-ENZYME PO) Take 200 mg by mouth daily      zinc 50 MG TABS tablet Take 50 mg by mouth daily      Probiotic Product (PROBIOTIC DAILY PO) Take 0.5 mg by mouth      Melatonin 10 MG TABS Take by mouth nightly       nitroGLYCERIN (NITROSTAT) 0.4 MG SL tablet Place 1 tablet under the tongue as needed for Chest pain 25 tablet 3    aspirin EC 81 MG EC tablet Take 81 mg by mouth nightly      latanoprost (XALATAN) 0.005 % ophthalmic solution Place 0.005 drops into both eyes nightly  6    potassium chloride (KLOR-CON) 20 MEQ packet Take 10 mEq by mouth 3 times daily      isosorbide mononitrate (IMDUR) 30 MG extended release tablet Take 1 tablet by mouth nightly (Patient not taking: Reported on 5/3/2021) 30 tablet 1    zinc gluconate 50 MG tablet Take 50 mg by mouth daily      clonazePAM (KLONOPIN) 0.5 MG tablet Take 0.5 mg by mouth nightly. Indications: patient takes 1/2 tablet       No current facility-administered medications for this visit.       Social History     Socioeconomic History    Marital status:      Spouse name: Not on file    Number of children: Not on file    Years of education: Not on file    Highest education level: Not on file   Occupational History    Occupation: retired   Social Needs    Financial resource strain: Not on file    Food insecurity     Worry: Not on file     Inability: Not on file   [employer] needs     Medical: Not on file     Non-medical: Not on file   Tobacco Use    Smoking status: Never Smoker    Smokeless tobacco: Never Used   Substance and Sexual Activity    Alcohol use: No    Drug use: Never    Sexual activity: Not on file   Lifestyle    Physical activity     Days per week: Not on file     Minutes per session: Not on file    Stress: Not on file   Relationships    Social connections     Talks on phone: Not on file     Gets together: Not on file     Attends Sabianism service: Not on file     Active member of club or organization: Not on file     Attends meetings of clubs or organizations: Not on file     Relationship status: Not on file    Intimate partner violence     Fear of current or ex partner: Not on file     Emotionally abused: Not on file     Physically abused: Not on file     Forced sexual activity: Not on file   Other Topics Concern    Not on file   Social History Narrative     8 years second marriage    He has 2 daughters    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 Physical Examination:  /64   Pulse 82   Ht 5' 6\" (1.676 m)   Wt 192 lb (87.1 kg)   BMI 30.99 kg/m²   Physical Exam  Vitals signs reviewed. Constitutional:       Appearance: He is well-developed. Neck:      Vascular: No carotid bruit or JVD. Cardiovascular:      Rate and Rhythm: Normal rate and regular rhythm. Heart sounds: Normal heart sounds. No murmur. No friction rub. No gallop. Pulmonary:      Effort: Pulmonary effort is normal. No respiratory distress. Breath sounds: Normal breath sounds. No wheezing or rales. Abdominal:      General: There is no distension. Tenderness: There is no abdominal tenderness. Lymphadenopathy:      Cervical: No cervical adenopathy. Skin:     General: Skin is warm and dry. ASSESSMENT:     Diagnosis Orders   1. Hx of CABG  VL LOWER EXTREMITY ARTERIAL SEGMENTAL PRESSURES W PPG   2. Coronary artery disease involving native coronary artery of native heart without angina pectoris  VL LOWER EXTREMITY ARTERIAL SEGMENTAL PRESSURES W PPG   3. Other forms of angina pectoris (HCC)  VL LOWER EXTREMITY ARTERIAL SEGMENTAL PRESSURES W PPG   4.  Claudication ([address]  VL LOWER EXTREMITY ARTERIAL SEGMENTAL PRESSURES W PPG       PLAN:  Orders Placed This Encounter   Procedures    VL LOWER EXTREMITY ARTERIAL SEGMENTAL PRESSURES W PPG     No orders of the defined types were placed in this encounter. 1. Continue present medications  2. Recommend Zio patch  3. Recommend lower extremity bilateral plethysmography  4. Recommend follow-up assessment in 6 months  5. We will check orthostatic pressures today    Return in about 6 months (around 11/3/2021) for return to Dr. Kody Blackman only. Aydee Ruiz MD 5/3/2021 10:35 AM CDT    [facility]      Thisdictation was generated by voice recognition computer software. Although all attempts are made to edit the dictation for accuracy, there may be errors in the transcription that are not intended.

## 2021-05-13 ENCOUNTER — HOSPITAL ENCOUNTER (OUTPATIENT)
Dept: VASCULAR LAB | Age: 76
Discharge: HOME | End: 2021-05-13
Payer: MEDICARE

## 2021-05-13 DIAGNOSIS — I25.10 CORONARY ARTERY DISEASE INVOLVING NATIVE CORONARY ARTERY OF NATIVE HEART WITHOUT ANGINA PECTORIS: ICD-10-CM

## 2021-05-13 DIAGNOSIS — I73.9 CLAUDICATION (HCC): ICD-10-CM

## 2021-05-13 DIAGNOSIS — I20.8 OTHER FORMS OF ANGINA PECTORIS (HCC): ICD-10-CM

## 2021-05-13 DIAGNOSIS — Z95.1 HX OF CABG: ICD-10-CM

## 2021-05-13 PROCEDURE — 93923 UPR/LXTR ART STDY 3+ LVLS: CPT

## 2021-06-08 ENCOUNTER — TELEPHONE (OUTPATIENT)
Dept: CARDIOLOGY CLINIC | Age: 76
End: 2021-06-08

## 2021-06-08 NOTE — TELEPHONE ENCOUNTER
Patient had an dion to discuss vascular study and zio but cancelled the appointment. He did not give me the results due to patient having an upcoming appointment. Will have to talk with KIRAN. Notified patient of this.

## 2021-06-08 NOTE — TELEPHONE ENCOUNTER
Per Dr. Delfino Lancaster he will review on thursday when he is up here in the office. He wanted to discuss in the office but patient r/s.

## 2021-07-01 ENCOUNTER — OFFICE VISIT (OUTPATIENT)
Dept: CARDIOLOGY CLINIC | Age: 76
End: 2021-07-01
Payer: MEDICARE

## 2021-07-01 VITALS
SYSTOLIC BLOOD PRESSURE: 102 MMHG | BODY MASS INDEX: 31.18 KG/M2 | WEIGHT: 194 LBS | HEART RATE: 68 BPM | HEIGHT: 66 IN | DIASTOLIC BLOOD PRESSURE: 62 MMHG

## 2021-07-01 DIAGNOSIS — R06.02 SOB (SHORTNESS OF BREATH): Primary | ICD-10-CM

## 2021-07-01 DIAGNOSIS — R53.83 FATIGUE, UNSPECIFIED TYPE: ICD-10-CM

## 2021-07-01 DIAGNOSIS — Z95.1 HX OF CABG: ICD-10-CM

## 2021-07-01 DIAGNOSIS — E78.2 MIXED HYPERLIPIDEMIA: ICD-10-CM

## 2021-07-01 DIAGNOSIS — I25.10 CORONARY ARTERY DISEASE INVOLVING NATIVE CORONARY ARTERY OF NATIVE HEART WITHOUT ANGINA PECTORIS: ICD-10-CM

## 2021-07-01 DIAGNOSIS — I20.8 OTHER FORMS OF ANGINA PECTORIS (HCC): ICD-10-CM

## 2021-07-01 DIAGNOSIS — E78.00 HYPERCHOLESTEROLEMIA: ICD-10-CM

## 2021-07-01 PROCEDURE — G8417 CALC BMI ABV UP PARAM F/U: HCPCS | Performed by: INTERNAL MEDICINE

## 2021-07-01 PROCEDURE — 1123F ACP DISCUSS/DSCN MKR DOCD: CPT | Performed by: INTERNAL MEDICINE

## 2021-07-01 PROCEDURE — 1036F TOBACCO NON-USER: CPT | Performed by: INTERNAL MEDICINE

## 2021-07-01 PROCEDURE — 4040F PNEUMOC VAC/ADMIN/RCVD: CPT | Performed by: INTERNAL MEDICINE

## 2021-07-01 PROCEDURE — G8427 DOCREV CUR MEDS BY ELIG CLIN: HCPCS | Performed by: INTERNAL MEDICINE

## 2021-07-01 PROCEDURE — 99214 OFFICE O/P EST MOD 30 MIN: CPT | Performed by: INTERNAL MEDICINE

## 2021-07-01 ASSESSMENT — ENCOUNTER SYMPTOMS
NAUSEA: 0
DIARRHEA: 0
RESPIRATORY NEGATIVE: 1
VOMITING: 0
EYES NEGATIVE: 1
GASTROINTESTINAL NEGATIVE: 1
SHORTNESS OF BREATH: 0

## 2021-07-01 NOTE — PROGRESS NOTES
[facility] Progress Note                            Date:  7/1/2021  Patient: Abraham Alatorre  Age:  67 y.o., 1945      Reason for evaluation:         SUBJECTIVE:    Returns today follow-up assessment follow-up for coronary artery disease previous CABG hyperlipidemia biggest complaint is fatigue and some shortness of breath denies anginal chest pain and previous complaints of leg discomfort have improved. No labs since 8/10/2020. Most recent lipid profile on file with 4/27/2020 LDL 59. Blood pressure 102/62 heart 68. Syeda Nielsen 4/13/2021 EF 50% no definite areas of ischemia apical defect possible scar. Echocardiogram 1/14/2020 mild impairment of LV function EF 45 to 50% trace MR. Review of Systems   Constitutional: Negative. Negative for chills, fever and unexpected weight change. HENT: Negative. Eyes: Negative. Respiratory: Negative. Negative for shortness of breath. Cardiovascular: Negative. Negative for chest pain. Gastrointestinal: Negative. Negative for diarrhea, nausea and vomiting. Endocrine: Negative. Genitourinary: Negative. Musculoskeletal: Negative. Skin: Negative. Neurological: Negative. All other systems reviewed and are negative. OBJECTIVE:     /62   Pulse 68   Ht 5' 6\" (1.676 m)   Wt 194 lb (88 kg)   BMI 31.31 kg/m²     Labs:   CBC: No results for input(s): WBC, HGB, HCT, PLT in the last 72 hours. BMP:No results for input(s): NA, K, CO2, BUN, CREATININE, LABGLOM, GLUCOSE in the last 72 hours. BNP: No results for input(s): BNP in the last 72 hours. PT/INR: No results for input(s): PROTIME, INR in the last 72 hours. APTT:No results for input(s): APTT in the last 72 hours. CARDIAC ENZYMES:No results for input(s): CKTOTAL, CKMB, CKMBINDEX, TROPONINI in the last 72 hours.   FASTING LIPID PANEL:  Lab Results   Component Value Date    HDL 43 04/27/2020    LDLCALC 59 04/27/2020    TRIG 146 04/27/2020     LIVER PROFILE:No results for input(s): AST, ALT, LABALBU in the last 72 hours. Past Medical History:   Diagnosis Date    Acute sinusitis     Arthritis     Atherosclerosis of coronary artery bypass graft(s) without angina pectoris     Bone pain     Cardiovascular disease     Chest pain     pressure    CPAP (continuous positive airway pressure) dependence 04/2018    8cm    Diarrhea     Dyspnea on exertion     ED (erectile dysfunction)     Glaucoma     pre glaucoma    Hyperlipidemia     Hypertension     Hypoglycemia, unspecified     Left shoulder pain     Low back pain     Memory disorder     Nocturia     Obstructive sleep apnea     AHI:  47 per [facility] in [place], 3/2018    Other fatigue     Polycythemia     REM sleep behavior disorder     Sleep terrors     Unstable angina (HCC)      Past Surgical History:   Procedure Laterality Date    APPENDECTOMY      CORONARY ARTERY BYPASS GRAFT      CORONARY ARTERY BYPASS GRAFT      aortofemoral    DIAGNOSTIC CARDIAC CATH LAB PROCEDURE      HERNIA REPAIR      HIP ARTHROPLASTY Right     LITHOTRIPSY       Family History   Problem Relation Age of Onset    No Known Problems Mother     Heart Disease Father      No Known Allergies  Current Outpatient Medications   Medication Sig Dispense Refill    potassium citrate (UROCIT-K) 10 MEQ (1080 MG) extended release tablet Take by mouth 3 times daily (with meals)      hydroxyurea (HYDREA) 500 MG chemo capsule Take by mouth 2 times daily      potassium chloride (KLOR-CON) 20 MEQ packet Take 10 mEq by mouth 3 times daily      metoprolol succinate (TOPROL XL) 25 MG extended release tablet Take 0.5 tablets by mouth daily 15 tablet 5    clonazePAM (KLONOPIN) 0.5 MG tablet Take 0.5 mg by mouth daily.       memantine (NAMENDA) 10 MG tablet Take 10 mg by mouth nightly      atorvastatin (LIPITOR) 20 MG tablet Take 1 tablet by mouth daily 90 tablet 3    tamsulosin (FLOMAX) 0.4 MG capsule Take 0.4 mg by mouth daily      zinc gluconate 50 MG tablet Take 50 mg by mouth daily      Coenzyme Q10 (Q-10 CO-ENZYME PO) Take 200 mg by mouth daily      zinc 50 MG TABS tablet Take 50 mg by mouth daily      Probiotic Product (PROBIOTIC DAILY PO) Take 0.5 mg by mouth      Melatonin 10 MG TABS Take by mouth nightly       clonazePAM (KLONOPIN) 0.5 MG tablet Take 0.5 mg by mouth nightly. Indications: patient takes 1/2 tablet      nitroGLYCERIN (NITROSTAT) 0.4 MG SL tablet Place 1 tablet under the tongue as needed for Chest pain 25 tablet 3    aspirin EC 81 MG EC tablet Take 81 mg by mouth nightly      latanoprost (XALATAN) 0.005 % ophthalmic solution Place 0.005 drops into both eyes nightly  6     No current facility-administered medications for this visit. 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 Social Determinants of Health     Financial Resource Strain:     Difficulty of Paying Living Expenses:    Food Insecurity:     Worried About Running Out of Food in the Last Year:     [address] in the Last Year:    Transportation Needs:     Lack of Transportation (Medical):      Lack of Transportation (Non-Medical): Physical Activity:     Days of Exercise per Week:     Minutes of Exercise per Session:    Stress:     Feeling of Stress :    Social Connections:     Frequency of Communication with Friends and Family:     Frequency of Social Gatherings with Friends and Family:     Attends Catholic Services:     Active Member of Clubs or Organizations:     Attends Club or Organization Meetings:     Marital Status:    Intimate Partner Violence:     Fear of Current or Ex-Partner:     Emotionally Abused:     Physically Abused:     Sexually Abused:        Physical Examination:  /62   Pulse 68   Ht 5' 6\" (1.676 m)   Wt 194 lb (88 kg)   BMI 31.31 kg/m²   Physical Exam  Vitals reviewed. Constitutional:       Appearance: He is well-developed. Neck:      Vascular: No carotid bruit or JVD. Cardiovascular:      Rate and Rhythm: Normal rate and regular rhythm. Heart sounds: Normal heart sounds. No murmur heard. No friction rub. No gallop. Pulmonary:      Effort: Pulmonary effort is normal. No respiratory distress. Breath sounds: Normal breath sounds. No wheezing or rales. Abdominal:      General: There is no distension. Tenderness: There is no abdominal tenderness. Lymphadenopathy:      Cervical: No cervical adenopathy. Skin:     General: Skin is warm and dry. ASSESSMENT:     Diagnosis Orders   1. Hypercholesterolemia  CBC    Lipid Panel    Comprehensive Metabolic Panel    T4, Free    TSH without Reflex   2. Other forms of angina pectoris (HCC)  CBC    Lipid Panel    Comprehensive Metabolic Panel    T4, Free    TSH without Reflex   3. Hx of CABG  CBC    Lipid Panel    Comprehensive Metabolic Panel    T4, Free    TSH without Reflex   4. Coronary artery disease involving native coronary artery of native heart without angina pectoris  CBC    Lipid Panel    Comprehensive Metabolic Panel    T4, Free    TSH without Reflex    ECHO Complete 2D W Doppler W Color   5.  Mixed hyperlipidemia  CBC    Lipid Panel    Comprehensive Metabolic Panel    T4, Free    TSH without Reflex   6. Fatigue, unspecified type  CBC    Lipid Panel    Comprehensive Metabolic Panel    T4, Free    TSH without Reflex       PLAN:  Orders Placed This Encounter   Procedures    CBC    Lipid Panel    Comprehensive Metabolic Panel    T4, Free    TSH without Reflex    ECHO Complete 2D W Doppler W Color     No orders of the defined types were placed in this encounter. 1. Continue present medications  2. Laboratory studies as ordered  3. Recommend follow-up assessment in 6 months    Return in about 6 months (around 1/1/2022) for return to Dr. Kayy Arambula only. Red Gerard MD 7/1/2021 3:40 PM CDT    [facility]      Thisdictation was generated by voice recognition computer software. Although all attempts are made to edit the dictation for accuracy, there may be errors in the transcription that are not intended.

## 2021-07-02 DIAGNOSIS — R06.02 SOB (SHORTNESS OF BREATH): ICD-10-CM

## 2021-07-02 DIAGNOSIS — R53.83 FATIGUE, UNSPECIFIED TYPE: ICD-10-CM

## 2021-07-02 DIAGNOSIS — I20.8 OTHER FORMS OF ANGINA PECTORIS: ICD-10-CM

## 2021-07-02 DIAGNOSIS — Z95.1 HX OF CABG: ICD-10-CM

## 2021-07-02 DIAGNOSIS — I25.10 CORONARY ARTERY DISEASE INVOLVING NATIVE CORONARY ARTERY OF NATIVE HEART WITHOUT ANGINA PECTORIS: ICD-10-CM

## 2021-07-02 DIAGNOSIS — E78.2 MIXED HYPERLIPIDEMIA: ICD-10-CM

## 2021-07-02 DIAGNOSIS — E78.00 HYPERCHOLESTEROLEMIA: ICD-10-CM

## 2021-07-02 LAB
ALBUMIN SERPL-MCNC: 4.2 G/DL (ref 3.5–5.2)
ALP BLD-CCNC: 83 U/L (ref 40–130)
ALT SERPL-CCNC: 23 U/L (ref 5–41)
ANION GAP SERPL CALCULATED.3IONS-SCNC: 11 MMOL/L (ref 7–19)
AST SERPL-CCNC: 23 U/L (ref 5–40)
BILIRUB SERPL-MCNC: 0.9 MG/DL (ref 0.2–1.2)
BUN BLDV-MCNC: 19 MG/DL (ref 8–23)
CALCIUM SERPL-MCNC: 9.7 MG/DL (ref 8.8–10.2)
CHLORIDE BLD-SCNC: 105 MMOL/L (ref 98–111)
CHOLESTEROL, TOTAL: 118 MG/DL (ref 160–199)
CO2: 25 MMOL/L (ref 22–29)
CREAT SERPL-MCNC: 1.1 MG/DL (ref 0.5–1.2)
GFR AFRICAN AMERICAN: >59
GFR NON-AFRICAN AMERICAN: >60
GLUCOSE BLD-MCNC: 103 MG/DL (ref 74–109)
HCT VFR BLD CALC: 44.2 % (ref 42–52)
HDLC SERPL-MCNC: 42 MG/DL (ref 55–121)
HEMOGLOBIN: 15.6 G/DL (ref 14–18)
LDL CHOLESTEROL CALCULATED: 43 MG/DL
MCH RBC QN AUTO: 39.8 PG (ref 27–31)
MCHC RBC AUTO-ENTMCNC: 35.3 G/DL (ref 33–37)
MCV RBC AUTO: 112.8 FL (ref 80–94)
PDW BLD-RTO: 13.1 % (ref 11.5–14.5)
PLATELET # BLD: 162 K/UL (ref 130–400)
PMV BLD AUTO: 9.3 FL (ref 9.4–12.4)
POTASSIUM SERPL-SCNC: 4.3 MMOL/L (ref 3.5–5)
PRO-BNP: 97 PG/ML (ref 0–1800)
RBC # BLD: 3.92 M/UL (ref 4.7–6.1)
SODIUM BLD-SCNC: 141 MMOL/L (ref 136–145)
T4 FREE: 1.13 NG/DL (ref 0.93–1.7)
TOTAL PROTEIN: 6.9 G/DL (ref 6.6–8.7)
TRIGL SERPL-MCNC: 166 MG/DL (ref 0–149)
TSH SERPL DL<=0.05 MIU/L-ACNC: 1.89 UIU/ML (ref 0.27–4.2)
WBC # BLD: 5.4 K/UL (ref 4.8–10.8)

## 2021-07-06 ENCOUNTER — HOSPITAL ENCOUNTER (OUTPATIENT)
Dept: NON INVASIVE DIAGNOSTICS | Age: 76
Discharge: HOME | End: 2021-07-06
Payer: MEDICARE

## 2021-07-06 LAB
LV EF: 43 %
LVEF MODALITY: NORMAL

## 2021-07-06 PROCEDURE — 93306 TTE W/DOPPLER COMPLETE: CPT

## 2021-07-22 ENCOUNTER — TELEPHONE (OUTPATIENT)
Dept: CARDIOLOGY CLINIC | Age: 76
End: 2021-07-22

## 2021-08-26 DIAGNOSIS — I25.10 CORONARY ARTERY DISEASE INVOLVING NATIVE CORONARY ARTERY OF NATIVE HEART: ICD-10-CM

## 2021-08-26 RX ORDER — METOPROLOL SUCCINATE 25 MG/1
12.5 TABLET, EXTENDED RELEASE ORAL DAILY
Qty: 45 TABLET | Refills: 3 | Status: SHIPPED | OUTPATIENT
Start: 2021-08-26 | End: 2021-08-26

## 2021-08-26 RX ORDER — METOPROLOL SUCCINATE 25 MG/1
TABLET, EXTENDED RELEASE ORAL
Qty: 15 TABLET | Refills: 5 | Status: ON HOLD | OUTPATIENT
Start: 2021-08-26 | End: 2021-11-16 | Stop reason: SDUPTHER

## 2021-08-26 RX ORDER — ATORVASTATIN CALCIUM 20 MG/1
20 TABLET, FILM COATED ORAL DAILY
Qty: 90 TABLET | Refills: 3 | Status: SHIPPED | OUTPATIENT
Start: 2021-08-26 | End: 2022-10-10 | Stop reason: SDUPTHER

## 2021-10-28 ENCOUNTER — OFFICE VISIT (OUTPATIENT)
Dept: NEUROLOGY | Age: 76
End: 2021-10-28
Payer: MEDICARE

## 2021-10-28 VITALS
BODY MASS INDEX: 31.18 KG/M2 | HEIGHT: 66 IN | HEART RATE: 64 BPM | SYSTOLIC BLOOD PRESSURE: 110 MMHG | RESPIRATION RATE: 14 BRPM | DIASTOLIC BLOOD PRESSURE: 63 MMHG | WEIGHT: 194 LBS

## 2021-10-28 DIAGNOSIS — G47.52 REM SLEEP BEHAVIOR DISORDER: ICD-10-CM

## 2021-10-28 DIAGNOSIS — G47.33 OBSTRUCTIVE SLEEP APNEA: Primary | ICD-10-CM

## 2021-10-28 DIAGNOSIS — R53.83 OTHER FATIGUE: ICD-10-CM

## 2021-10-28 DIAGNOSIS — G31.84 MILD COGNITIVE IMPAIRMENT: ICD-10-CM

## 2021-10-28 PROCEDURE — 1123F ACP DISCUSS/DSCN MKR DOCD: CPT | Performed by: PSYCHIATRY & NEUROLOGY

## 2021-10-28 PROCEDURE — 4040F PNEUMOC VAC/ADMIN/RCVD: CPT | Performed by: PSYCHIATRY & NEUROLOGY

## 2021-10-28 PROCEDURE — G8417 CALC BMI ABV UP PARAM F/U: HCPCS | Performed by: PSYCHIATRY & NEUROLOGY

## 2021-10-28 PROCEDURE — 99213 OFFICE O/P EST LOW 20 MIN: CPT | Performed by: PSYCHIATRY & NEUROLOGY

## 2021-10-28 PROCEDURE — 1036F TOBACCO NON-USER: CPT | Performed by: PSYCHIATRY & NEUROLOGY

## 2021-10-28 PROCEDURE — G8427 DOCREV CUR MEDS BY ELIG CLIN: HCPCS | Performed by: PSYCHIATRY & NEUROLOGY

## 2021-10-28 PROCEDURE — G8484 FLU IMMUNIZE NO ADMIN: HCPCS | Performed by: PSYCHIATRY & NEUROLOGY

## 2021-10-28 RX ORDER — MEMANTINE HYDROCHLORIDE 28 MG/1
28 CAPSULE, EXTENDED RELEASE ORAL DAILY
Qty: 30 CAPSULE | Refills: 11 | Status: SHIPPED | OUTPATIENT
Start: 2021-10-28 | End: 2022-04-28

## 2021-10-28 NOTE — PATIENT INSTRUCTIONS
INSTRUCTIONS:  1. Change the Namenda to the XR form 28 mg daily. 2. Continue use of CPAP during sleep  3. Continue clonazepam at bedtime  4. Will have you get an overnight pulse oximetry.

## 2021-10-28 NOTE — PROGRESS NOTES
[facility]  [address] [place] [address] Drive, [address]  [place], [place] [address]  Phone [phone]  Fax [phone]     [facility] Follow Up Encounter  10/28/21 12:19 PM CDT    Information:   Patient Name: Tammi Ramsey  :   1945  Age:   67 y.o. MRN:   [id number]  Account #:  [de-identified]  Today:  10/28/21    Provider: Annie Sahni M.D. Chief Complaint:   Chief Complaint   Patient presents with    6 Month Follow-Up       Subjective:   Tammi Ramsey is a 67 y.o. man with a history of mild cognitive impairment, RSBD, and ANGE who is following up. He uses his CPAP nightly. He has few active dreams. He does take the clonazepam at bedtime. His memory is about the same. He remains independent. He does take Namenda. He complains of fatigue and daytime drowsiness. He has had no tremor, gait impairment, problems with fine motor skills.         Objective:     Past Medical History:  Past Medical History:   Diagnosis Date    Acute sinusitis     Arthritis     Atherosclerosis of coronary artery bypass graft(s) without angina pectoris     Bone pain     Cardiovascular disease     Chest pain     pressure    CPAP (continuous positive airway pressure) dependence 2018    8cm    Diarrhea     Dyspnea on exertion     ED (erectile dysfunction)     Glaucoma     pre glaucoma    Hyperlipidemia     Hypertension     Hypoglycemia, unspecified     Left shoulder pain     Low back pain     Memory disorder     Nocturia     Obstructive sleep apnea     AHI:  47 per [facility] in [place], 3/2018    Other fatigue     Polycythemia     REM sleep behavior disorder     Sleep terrors     Unstable angina (HCC)        Past Surgical History:   Procedure Laterality Date    APPENDECTOMY      CORONARY ARTERY BYPASS GRAFT      CORONARY ARTERY BYPASS GRAFT      aortofemoral    DIAGNOSTIC CARDIAC CATH LAB PROCEDURE      HERNIA REPAIR      HIP ARTHROPLASTY Right     LITHOTRIPSY         Recent Hospitalizations  · None    Significant Injuries  · None    Habits  Mamadou Maria reports that he has never smoked. He has never used smokeless tobacco. He reports that he does not drink alcohol and does not use drugs. Family History   Problem Relation Age of Onset    No Known Problems Mother     Heart Disease Father        Social History  Tor Leal is , lives [facility], and is retired. Medications:  Current Outpatient Medications   Medication Sig Dispense Refill    atorvastatin (LIPITOR) 20 MG tablet Take 1 tablet by mouth daily 90 tablet 3    metoprolol succinate (TOPROL XL) 25 MG extended release tablet TAKE 1/2 TABLET BY MOUTH DAILY 15 tablet 5    potassium citrate (UROCIT-K) 10 MEQ (1080 MG) extended release tablet Take by mouth 3 times daily (with meals)      hydroxyurea (HYDREA) 500 MG chemo capsule Take by mouth daily       potassium chloride (KLOR-CON) 20 MEQ packet Take 10 mEq by mouth 3 times daily      memantine (NAMENDA) 10 MG tablet Take 10 mg by mouth nightly      tamsulosin (FLOMAX) 0.4 MG capsule Take 0.4 mg by mouth daily      zinc gluconate 50 MG tablet Take 50 mg by mouth daily      Coenzyme Q10 (Q-10 CO-ENZYME PO) Take 200 mg by mouth daily      Probiotic Product (PROBIOTIC DAILY PO) Take 0.5 mg by mouth      Melatonin 10 MG TABS Take by mouth nightly       clonazePAM (KLONOPIN) 0.5 MG tablet Take 0.5 mg by mouth nightly. Indications: patient takes 1/2 tablet      nitroGLYCERIN (NITROSTAT) 0.4 MG SL tablet Place 1 tablet under the tongue as needed for Chest pain 25 tablet 3    aspirin EC 81 MG EC tablet Take 81 mg by mouth nightly      latanoprost (XALATAN) 0.005 % ophthalmic solution Place 0.005 drops into both eyes nightly  6     No current facility-administered medications for this visit. Allergies:   Allergies as of 10/28/2021    (No Known Allergies)       Review of Systems:  Constitutional: negative for - chills and fever  Eyes:  negative for - visual disturbance and photophobia  HENMT: negative for - headaches and sinus pain  Respiratory: negative for - cough, hemoptysis, and shortness of breath  Cardiovascular: negative for - chest pain and palpitations  Gastrointestinal: negative for - blood in stools, constipation, diarrhea, nausea, and vomiting  Genito-Urinary: negative for - hematuria, urinary frequency, urinary urgency, and urinary retention  Musculoskeletal: negative for - joint pain, joint stiffness, and joint swelling  Hematological and Lymphatic: negative for - bleeding problems, abnormal bruising, and swollen lymph nodes  Endocrine:  negative for - polydipsia and polyphagia  Allergy/Immunology:  negative for - rhinorrhea, sinus congestion, hives  Integument:  negative for - negative for - rash, change in moles, new or changing lesions  Psychological: negative for - anxiety and depression  Neurological: positive for - memory loss  Negative for - numbness/tingling, and weakness     PHYSICAL EXAMINATION:  Vitals:  /63   Pulse 64   Resp 14   Ht 5' 6\" (1.676 m)   Wt 194 lb (88 kg)   BMI 31.31 kg/m²   General appearance:  Alert, well developed, well nourished, in no distress  HEENT:  normocephalic, atraumatic, sclera appear normal, no nasal abnormalities, no rhinorrhea, Ears appear normal, oral mucous membranes are moist without erythema, trachea midline, thyroid is normal, no lymphadenopathy or neck mass. Cardiovascular:  Regular rate and rhythm without murmer. No peripheral edema, No cyanosis or clubbing. No carotid bruits. Pulmonary:  Lungs are clear to auscultation. Breathing appears normal, good expansion, normal effort without use of accessory muscles  Musculoskeletal:  Joints are arthritic  Integument:  No rash, erythema, or pallor  Psychiatric:  Mood, affect, and behavior appear normal      NEUROLOGIC EXAMINATION:  Mental Status:  alert, oriented to person, place, and time.   Speech:  Clear without dysarthria or dysphonia  Language:  Fluent without aphasia  Cranial Nerves:   II Visual fields are full to confrontation   III,IV, VI Extraocular movements are full   VII Facial movements are symmetrical without weakness   VIII Hearing is intact   IX,X Shoulder shrug and head rotation strength are intact   XII No tongue atrophy or fasciculations. Normal tongue protrusion. No tongue weakness  Motor:  Normal strength in both upper and lower extremities. Normal muscle tone and bulk. Deep tendon reflexes are intact and symmetrical in both upper and lower extremities. Bentley's signs are absent bilaterally. There is no ankle clonus on either side. Sensation:  Sensation is intact to light touch, temperature, and vibration in all extremities. Coordination:  Rapid alternating movements are normal in both upper and lower extremities. Finger to nose testing is unimpaired bilaterally. Gait:  Normal station and gait. Pertinent Diagnostic Studies:  CPAP download showed 90% compliance with auto CPAP at 6cm to 20cm with residual AHI of 1.0. Assessment:       ICD-10-CM    1. Obstructive sleep apnea  G47.33    2. Mild cognitive impairment  G31.84    3. REM sleep behavior disorder  G47.52    4. Other fatigue  R53.83    He is fairly stable. His main complaint is that of fatigue. He has had recent labs. Given his age and medical problems, he is not a good candidate for provigil or other stimulant. Plan:   1. Change the Namenda to the XR form 28 mg daily. 2. Continue use of CPAP during sleep  3. Continue clonazepam at bedtime  4. Will have you get an overnight pulse oximetry.     5. FU in a year    Electronically signed by Gia Rob MD on 10/28/21

## 2021-11-04 ENCOUNTER — OFFICE VISIT (OUTPATIENT)
Dept: CARDIOLOGY CLINIC | Age: 76
End: 2021-11-04
Payer: MEDICARE

## 2021-11-04 VITALS
DIASTOLIC BLOOD PRESSURE: 62 MMHG | HEART RATE: 61 BPM | SYSTOLIC BLOOD PRESSURE: 116 MMHG | WEIGHT: 195 LBS | BODY MASS INDEX: 31.34 KG/M2 | OXYGEN SATURATION: 96 % | HEIGHT: 66 IN

## 2021-11-04 DIAGNOSIS — I25.10 CORONARY ARTERY DISEASE INVOLVING NATIVE CORONARY ARTERY OF NATIVE HEART WITHOUT ANGINA PECTORIS: Primary | ICD-10-CM

## 2021-11-04 DIAGNOSIS — E78.5 HYPERLIPIDEMIA, UNSPECIFIED HYPERLIPIDEMIA TYPE: ICD-10-CM

## 2021-11-04 DIAGNOSIS — I10 ESSENTIAL HYPERTENSION: ICD-10-CM

## 2021-11-04 PROCEDURE — 99214 OFFICE O/P EST MOD 30 MIN: CPT | Performed by: NURSE PRACTITIONER

## 2021-11-04 PROCEDURE — 4040F PNEUMOC VAC/ADMIN/RCVD: CPT | Performed by: NURSE PRACTITIONER

## 2021-11-04 PROCEDURE — 1036F TOBACCO NON-USER: CPT | Performed by: NURSE PRACTITIONER

## 2021-11-04 PROCEDURE — G8484 FLU IMMUNIZE NO ADMIN: HCPCS | Performed by: NURSE PRACTITIONER

## 2021-11-04 PROCEDURE — G8417 CALC BMI ABV UP PARAM F/U: HCPCS | Performed by: NURSE PRACTITIONER

## 2021-11-04 PROCEDURE — G8427 DOCREV CUR MEDS BY ELIG CLIN: HCPCS | Performed by: NURSE PRACTITIONER

## 2021-11-04 PROCEDURE — 1123F ACP DISCUSS/DSCN MKR DOCD: CPT | Performed by: NURSE PRACTITIONER

## 2021-11-04 ASSESSMENT — ENCOUNTER SYMPTOMS
SHORTNESS OF BREATH: 0
CHEST TIGHTNESS: 0
COUGH: 0
WHEEZING: 0
SORE THROAT: 0

## 2021-11-04 NOTE — PROGRESS NOTES
[facility]   Established Patient Office Visit   [facility] [address] [facility]  [phone]        OFFICE VISIT:  2021    Shoshana Spurling - : 1945    Reason For Visit:  Sushma Joseph is a 67 y.o. male who is here for 6 Month Follow-Up    1. Coronary artery disease involving native coronary artery of native heart without angina pectoris    2. Essential hypertension    3. Hyperlipidemia, unspecified hyperlipidemia type        Patient presents to clinic today for 6-month follow-up. Patient with a history of coronary artery disease/CABG, hypertension and hyperlipidemia. He is a patient of Dr. Daphnie Santizo. Patient presents to clinic today with complaint of chest pain. This has been occurring couple times a week for the last couple months. Pain located left side of his chest.  There is no radiation. Pain is lasting about 5 minutes. There has been no associated shortness of breath or lightheadedness. Pain is similar to his anginal equivalent.     Subjective    Shoshana Spurling is a 67 y.o. male with the following history as recorded in [facility]:    Patient Active Problem List    Diagnosis Date Noted    Hypercholesterolemia 2020    Coronary artery disease involving native coronary artery of native heart 2020    Hx of CABG 2020    Abnormal stress test 2020    Mild cognitive impairment 2021    Chest pain 2020    Mixed hyperlipidemia 2020    Fatigue 2019    Obstructive sleep apnea     CPAP (continuous positive airway pressure) dependence     Sleep apnea, obstructive 2018    REM sleep behavior disorder 2018    Memory loss 2018    Transient global amnesia 2018     Current Outpatient Medications   Medication Sig Dispense Refill    memantine ER (NAMENDA XR) 28 MG CP24 extended release capsule Take 1 capsule by mouth daily 30 capsule 11    atorvastatin (LIPITOR) 20 MG tablet Take 1 tablet by mouth daily 90 tablet 3    metoprolol succinate (TOPROL XL) 25 MG extended release tablet TAKE 1/2 TABLET BY MOUTH DAILY 15 tablet 5    potassium citrate (UROCIT-K) 10 MEQ (1080 MG) extended release tablet Take by mouth 3 times daily (with meals)      hydroxyurea (HYDREA) 500 MG chemo capsule Take by mouth daily       potassium chloride (KLOR-CON) 20 MEQ packet Take 10 mEq by mouth 3 times daily      memantine (NAMENDA) 10 MG tablet Take 10 mg by mouth nightly      tamsulosin (FLOMAX) 0.4 MG capsule Take 0.4 mg by mouth daily      zinc gluconate 50 MG tablet Take 50 mg by mouth daily      Coenzyme Q10 (Q-10 CO-ENZYME PO) Take 200 mg by mouth daily      Probiotic Product (PROBIOTIC DAILY PO) Take 0.5 mg by mouth      Melatonin 10 MG TABS Take by mouth nightly       clonazePAM (KLONOPIN) 0.5 MG tablet Take 0.5 mg by mouth nightly. Indications: patient takes 1/2 tablet      nitroGLYCERIN (NITROSTAT) 0.4 MG SL tablet Place 1 tablet under the tongue as needed for Chest pain 25 tablet 3    aspirin EC 81 MG EC tablet Take 81 mg by mouth nightly      latanoprost (XALATAN) 0.005 % ophthalmic solution Place 0.005 drops into both eyes nightly  6     No current facility-administered medications for this visit. Allergies: Patient has no known allergies.   Past Medical History:   Diagnosis Date    Acute sinusitis     Arthritis     Atherosclerosis of coronary artery bypass graft(s) without angina pectoris     Bone pain     Cardiovascular disease     Chest pain     pressure    CPAP (continuous positive airway pressure) dependence 04/2018    8cm    Diarrhea     Dyspnea on exertion     ED (erectile dysfunction)     Glaucoma     pre glaucoma    Hyperlipidemia     Hypertension     Hypoglycemia, unspecified     Left shoulder pain     Low back pain     Memory disorder     Nocturia     Obstructive sleep apnea     AHI:  47 per [facility] in [place], 3/2018    Other fatigue     Polycythemia     REM sleep behavior disorder  Sleep terrors     Unstable angina (HCC)      Past Surgical History:   Procedure Laterality Date    APPENDECTOMY      CORONARY ARTERY BYPASS GRAFT      CORONARY ARTERY BYPASS GRAFT      aortofemoral    DIAGNOSTIC CARDIAC CATH LAB PROCEDURE      HERNIA REPAIR      HIP ARTHROPLASTY Right     LITHOTRIPSY       Family History   Problem Relation Age of Onset    No Known Problems Mother     Heart Disease Father      Social History     Tobacco Use    Smoking status: Never Smoker    Smokeless tobacco: Never Used   Substance Use Topics    Alcohol use: No          Review of Systems:    Review of Systems   Constitutional: Positive for activity change. Negative for chills, diaphoresis, fatigue and fever. HENT: Negative for congestion and sore throat. Respiratory: Negative for cough, chest tightness, shortness of breath and wheezing. Cardiovascular: Positive for chest pain. Negative for palpitations and leg swelling. Neurological: Negative for dizziness, syncope, light-headedness and headaches. Objective:    /62   Pulse 61   Ht 5' 6\" (1.676 m)   Wt 195 lb (88.5 kg)   SpO2 96%   BMI 31.47 kg/m²     GENERAL - well developed and well nourished, conversant  HEENT   PERRLA, Hearing appears normal, gentleman lids are normal.  External inspection of ears and nose appear normal.  NECK - no thyromegaly, no JVD, trachea is in the midline  CARDIOVASCULAR  PMI is in the mid line clavicular position, Normal S1 and S2 with no systolic murmur. No S3 or S4    PULMONARY  no respiratory distress. No wheezes or rales. Lungs are clear to ausculation, normal respiratory effort. ABDOMEN   soft, non tender, no rebound  MUSCULOSKELETAL   range of motion of the upper and lower extermites appears normal and equal and is without pain   EXTREMITIES - no significant edema   NEUROLOGIC  gait and station are normal  SKIN - turgor is normal, can warm and dry.   PSYCHIATRIC - normal mood and affect, alert and orientated x 3,      ASSESSMENT:    ALL THE CARDIOLOGY PROBLEMS ARE LISTED ABOVE; HOWEVER, THE FOLLOWING SPECIFIC CARDIAC PROBLEMS / CONDITIONS WERE ADDRESSED AND TREATED DURING THE OFFICE VISIT TODAY:                                                                                            MEDICAL DECISION MAKING             Cardiac Specific Problem / Diagnosis  Discussion and Data Reviewed Diagnostic Procedures Ordered Management Options Selected           1. Chest Pain   Chief complaint   Review and summation of old records:    Patient had a 2D echo in July showing a further reduced EF to 40 to 45%. Patient is on Lipitor, Toprol, aspirin. Chest pain occurring more frequently now. We will schedule cardiac catheterization with Dr. Karine Edmonds. Yes Continue current medications:     Yes           2. Hypertension  Well Controlled   Blood pressure in the office today 116/62. O2 sat 96%. Patient is on Toprol-XL 25 mg daily. No Continue current medications:    Yes           3. Hyperlipidemia Stable  patient is on Lipitor 20 mg daily. No Continue current medications:       Yes           4. CAD Shows no change  given patient's history of CAD/CABG and recurrent anginal equivalent will order cardiac catheterization. Patient is on Lipitor, Toprol, and aspirin. Yes Continue current medications:       Yes     No orders of the defined types were placed in this encounter. No orders of the defined types were placed in this encounter. Discussed with patient. No follow-ups on file. I greatly appreciate the opportunity to meet Tammi Ramsey and your confidence in allowing me to participate in his cardiovascular care. NAYA Stevens NP  11/4/2021 9:14 AM CDT                    This dictation was generated by voice recognition computer software. Although all attempts are made to edit dictation for accuracy, there may be errors in the transcription that are not intended.

## 2021-11-04 NOTE — PATIENT INSTRUCTIONS
[place] at the [facility] and [address] located on the first floor of [place] [address] through hospital main entrance and turn immediately to your left. Date/Time of Heart Catheterization :________________________________________    Pre-operative work-up:      1. COVID testing is required before heart catheterizations as required by [place]. This is required even if you do not have symptoms    You will need to have your Covid testing done on__________________________ before 11 AM at [facility], first floor drive-through at [facility] Dr. Alvar[facility] NATALIYA testing you need to isolate yourself between the time of testing and your procedure. 2.  You will need blood work prior to your procedure. Please have CBC and CMP drawn at outpatient lab or your local clinic. Can do same day as Covid testing  You may need a Chest X ray- do at [address] [place] [address] the same day as labs are drawn    Allergies:  Patient has no known allergies. Contact number:  [phone] (home) [phone] (work)    Cardiac Catheterization Instructions   · Do not eat or drink anything after midnight on the night before your procedure. If your procedure is in the afternoon you need to have nothing to eat or drink for 8 hours prior to test you can take your morning medications with a sip of water unless otherwise directed not to. · Bring a list of the names and dosages of all the medications you are taking. · All blood thinner should be stopped 2 days prior to procedure, this includes Coumadin/warfarin, Pradaxa, Eliquis, Xarelto or's of Asa Coumadin (warfarin) should be stopped two days prior to this procedure. · Please hold Metformin or Glucophage 2 days prior to testing. He will hold this after testing is well  · You should arrange to have someone take you home rather than drive yourself. · Further plan will depend upon the result of the cardiac catheterization.       If for any reason you are unable to keep this appointment, please contact [facility], [phone], as soon as possible to reschedule.  -------------------------------------------------------------------------------------------------------------------  Cardiac Catheterization   (Coronary Angiography; Coronary Arteriography; Coronary Angiogram)   Definition:  Cardiac catheterization is a test that uses a catheter (tube) and x-ray machine to assess the heart and its blood supply. Reasons for Procedure   It is used to find the cause of symptoms, like chest pain, that could mean heart problems. Cardiac catheterization helps doctors to:    Identify narrowed or clogged arteries of the heart    Measure blood pressure within the heart    Evaluate how well the heart valves and chambers function    Check heart defects    Evaluate an enlarged heart    Decide on an appropriate treatment   Possible Complications   If you are planning to have a cardiac catheterization, your doctor will review a list of possible complications, which may include:    Bleeding at the point of the catheter insertion    Damage to arteries    Heart attack or arrhythmia (abnormal heart beats)    Allergic reaction to x-ray dye    Blood clot formation    Infection   Some factors that may increase the risk of complications include:    Allergies to medicines or x-ray dye    Obesity    Smoking    Bleeding disorder    Age: 61 or older    Recent pneumonia    Recent heart attack    Diabetes    Kidney disease   What to Expect Prior to Procedure   Your doctor may order:    Blood and urine tests    Electrocardiogram (ECG, EKG)a test that records the heart's activity by measuring electrical currents through the heart muscle    Chest x-ray    Stress test   Talk to your doctor about your medicines.  You may be asked to stop taking some medicines before the procedure, like:    Anti-inflammatory drugs (eg, ibuprofen )    Blood thinners, like or warfarin (Coumadin), Pradaxa, Xarelto, Eliquis, Savaysa   clopidogrel (Plavix)    Metformin (Glucophage) or glyburide and metformin (Glucovance)   Leading up to your procedure:    Arrange for a ride to and from the procedure.  The night before, do not eat or drink anything after midnight. Anesthesia   Local anesthesia will be used at the insertion site. A mild sedative may be given one hour before or through IV during the procedure. This will help you relax. Description of the Procedure   During the procedure, you will receive IV fluids and medicines. An EKG will be monitoring your heart's activity. You will be awake but sedated so that you will be more relaxed. Your doctor will ask you to do basic functions such as coughing, breathing out, and holding your breath. If you feel any chest pain, dizziness, nausea, tingling, or other discomfort, tell your doctor. The area of the groin or arm where the catheter will be inserted is shaved, cleaned, and numbed. A needle will be inserted into a blood vessel. A wire will be passed through the needle and into the blood vessel. The wire will then be guided through until it reaches your heart. A soft, flexible catheter tube will then be slipped over the wire and threaded up to your heart. The doctor will be taking x-ray pictures during the procedure to know where the wire and catheter are. Dye will be injected into the arteries of the heart. This will make the arteries and heart show up on the x-ray images. You may feel warm during the dye injection. Insertion of Catheter with Guide Wire    Once in place, the catheter can be used to take measurements. Blood pressure can be taken within the heart's different chambers. Blood samples may also be taken. Multiple x-ray images will be taken to look for any disease in the arteries. An aortogram may also be done at this time. This step will give a clear image of the aorta (large artery leaving the heart).  Once all the tests and images are complete, the catheter will be removed. Sometimes, the doctor will perform balloon angioplasty and stenting if he finds an area in your arteries that is narrow or clogged. These are procedures that help to open narrowed arteries. Finally, a bandage will be placed over the groin or arm area. How Long Will It Take? The procedure takes about 1-2 hours. Preparation before the test will take another 1-2 hours. How Much Will It Hurt? Although the procedure is generally not painful, it can cause some discomfort, including:    Burning sensation (when skin at catheter insertion site is anesthetized)    Pressure when catheter is inserted or replaced with other catheters    A flushing feeling or nausea when the dye is injected    Headache    Heart palpitations   Pain medicine will be given when needed. Average Hospital Stay:  0-1 days     Postoperative Care At the [address] [place] [address] EKG and blood studies may be done.  You will likely need to lie still and flat on your back for a period of time. A pressure dressing may be placed over the area where the catheter was inserted to help prevent bleeding. It is important to follow the nurse's directions. At Home   When you return home, do the following to help ensure a smooth recovery:    Do not drive until your doctor says it is okay.  Do not lift heavy objects or engage in strenuous exercise or sexual activity for at least 5-7 days.  Change the dressing around the incision area as instructed.  Your doctor will explain to you which medicines you can take and which ones to avoid. Take medicines as instructed.  Ice may help decrease discomfort at the insertion site. You may apply the ice for 15-20 minutes each hour, for the first few days.  To lower your risk for further complications of heart disease, you can make lifestyle changes. These include eating a healthier diet, exercising regularly, and managing stress.  Ask your doctor about when it is safe to shower, bathe, or soak in water.  Be sure to follow your doctor's instructions . After arriving home, contact your doctor if any of the following occurs:    Signs of infection, including fever and chills    Extreme sweating, nausea, or vomiting    Change in sensation to affected leg, including numbness, feeling cold, or change in color    Redness, swelling, increasing pain, excessive bleeding, or discharge at point of catheter insertion    Cough, shortness of breath, or difficulty breathing    Extreme pain    Chest pain    Drooping facial muscles    Changes in vision or speech    Difficulty walking or using your limbs    In case of an emergency, Call 911.

## 2021-11-05 ENCOUNTER — TELEPHONE (OUTPATIENT)
Dept: CARDIOLOGY CLINIC | Age: 76
End: 2021-11-05

## 2021-11-05 DIAGNOSIS — I25.10 CORONARY ARTERY DISEASE INVOLVING NATIVE CORONARY ARTERY OF NATIVE HEART WITHOUT ANGINA PECTORIS: Primary | ICD-10-CM

## 2021-11-05 NOTE — TELEPHONE ENCOUNTER
----- Message from NAYA Serrano NP sent at 11/4/2021  9:30 AM CDT -----  This is a Dr. Ade He patient. He has a history of CAD/CABG. Patient has been having angina similar to his anginal equivalent. Please schedule cardiac catheterization.

## 2021-11-05 NOTE — TELEPHONE ENCOUNTER
Patient is scheduled for 11/16 at 7 arrival for 9 procedure. COVID swab MUST be done at [place] [address] on 11/15 between 8AM and 12PM then required to quarantine until day of procedure. If patient does not go as instructed for covid swab patient will be rescheduled and required to retest again regardless. If quarantine is broke patient will be rescheduled and required to retest. Will only call with results if positive. Patient voiced understanding of all of this and had patient repeat back to me. He will also need lab work and will have it done prior to covid swab. Other instructions given below. [place] at the [facility] and [facility] located on the first floor of [place] [address] through hospital main entrance and turn immediately to your left. Pre-operative work-up:  CBC&BMP    Allergies:  Patient has no known allergies. Contact number:  [phone] (home) [phone] (work)    Cardiac Catheterization Instructions   · Do not eat or drink anything after midnight on the night before your procedure. You can take your morning medications with a sip of water unless otherwise directed not to. · Bring a list of the names and dosages of all the medications you are taking. · Diabetic medication should be stopped two days prior: Metformin (glucophage), Invokana (canagliflozin), Farxiga (dapagliflozin), Jardiance (empagliflozin)   · Coumadin (warfarin) should be stopped two days prior to this procedure. · Pradaxa (dabigatran) should be stopped one day prior to procedure. · You should arrange to have someone take you home rather than drive yourself. · Further plan will depend upon the result of the cardiac catheterization.       If for any reason you are unable to keep this appointment, please contact [facility], [phone], as soon as possible to reschedule.  -------------------------------------------------------------------------------------------------------------------

## 2021-11-11 NOTE — TELEPHONE ENCOUNTER
Patient called asking if he can still take his chemo pill but asked to not call her back until Saturday. Can not do that. It is okay to continue this med.

## 2021-11-15 ENCOUNTER — OFFICE VISIT (OUTPATIENT)
Age: 76
End: 2021-11-15

## 2021-11-15 DIAGNOSIS — I25.10 CORONARY ARTERY DISEASE INVOLVING NATIVE CORONARY ARTERY OF NATIVE HEART WITHOUT ANGINA PECTORIS: ICD-10-CM

## 2021-11-15 DIAGNOSIS — Z11.59 SCREENING FOR VIRAL DISEASE: Primary | ICD-10-CM

## 2021-11-15 LAB
ANION GAP SERPL CALCULATED.3IONS-SCNC: 11 MMOL/L (ref 7–19)
BUN BLDV-MCNC: 18 MG/DL (ref 8–23)
CALCIUM SERPL-MCNC: 9.6 MG/DL (ref 8.8–10.2)
CHLORIDE BLD-SCNC: 104 MMOL/L (ref 98–111)
CO2: 25 MMOL/L (ref 22–29)
CREAT SERPL-MCNC: 1.1 MG/DL (ref 0.5–1.2)
GFR AFRICAN AMERICAN: >59
GFR NON-AFRICAN AMERICAN: >60
GLUCOSE BLD-MCNC: 125 MG/DL (ref 74–109)
HCT VFR BLD CALC: 50.2 % (ref 42–52)
HEMOGLOBIN: 16.7 G/DL (ref 14–18)
MCH RBC QN AUTO: 34 PG (ref 27–31)
MCHC RBC AUTO-ENTMCNC: 33.3 G/DL (ref 33–37)
MCV RBC AUTO: 102.2 FL (ref 80–94)
PDW BLD-RTO: 13 % (ref 11.5–14.5)
PLATELET # BLD: 200 K/UL (ref 130–400)
PMV BLD AUTO: 9.8 FL (ref 9.4–12.4)
POTASSIUM SERPL-SCNC: 4 MMOL/L (ref 3.5–5)
RBC # BLD: 4.91 M/UL (ref 4.7–6.1)
SARS-COV-2, PCR: NOT DETECTED
SODIUM BLD-SCNC: 140 MMOL/L (ref 136–145)
WBC # BLD: 9.1 K/UL (ref 4.8–10.8)

## 2021-11-15 PROCEDURE — 99999 PR OFFICE/OUTPT VISIT,PROCEDURE ONLY: CPT | Performed by: NURSE PRACTITIONER

## 2021-11-16 ENCOUNTER — HOSPITAL ENCOUNTER (OUTPATIENT)
Dept: CARDIAC CATH/INVASIVE PROCEDURES | Age: 76
Discharge: HOME | End: 2021-11-16
Attending: INTERNAL MEDICINE | Admitting: INTERNAL MEDICINE
Payer: MEDICARE

## 2021-11-16 ENCOUNTER — APPOINTMENT (OUTPATIENT)
Dept: GENERAL RADIOLOGY | Age: 76
End: 2021-11-16
Attending: INTERNAL MEDICINE
Payer: MEDICARE

## 2021-11-16 VITALS
TEMPERATURE: 96.7 F | RESPIRATION RATE: 20 BRPM | DIASTOLIC BLOOD PRESSURE: 64 MMHG | HEART RATE: 64 BPM | BODY MASS INDEX: 31.34 KG/M2 | HEIGHT: 66 IN | OXYGEN SATURATION: 97 % | SYSTOLIC BLOOD PRESSURE: 112 MMHG | WEIGHT: 195 LBS

## 2021-11-16 DIAGNOSIS — I25.10 CORONARY ARTERY DISEASE INVOLVING NATIVE CORONARY ARTERY OF NATIVE HEART: ICD-10-CM

## 2021-11-16 LAB
ALBUMIN SERPL-MCNC: 3.9 G/DL (ref 3.5–5.2)
ALP BLD-CCNC: 83 U/L (ref 40–130)
ALT SERPL-CCNC: 29 U/L (ref 5–41)
ANION GAP SERPL CALCULATED.3IONS-SCNC: 10 MMOL/L (ref 7–19)
AST SERPL-CCNC: 25 U/L (ref 5–40)
BILIRUB SERPL-MCNC: 0.6 MG/DL (ref 0.2–1.2)
BUN BLDV-MCNC: 19 MG/DL (ref 8–23)
CALCIUM SERPL-MCNC: 9.2 MG/DL (ref 8.8–10.2)
CHLORIDE BLD-SCNC: 104 MMOL/L (ref 98–111)
CHOLESTEROL, TOTAL: 134 MG/DL (ref 160–199)
CO2: 24 MMOL/L (ref 22–29)
CREAT SERPL-MCNC: 1.1 MG/DL (ref 0.5–1.2)
EKG P AXIS: 55 DEGREES
EKG P-R INTERVAL: 140 MS
EKG Q-T INTERVAL: 436 MS
EKG QRS DURATION: 92 MS
EKG QTC CALCULATION (BAZETT): 429 MS
EKG T AXIS: 57 DEGREES
GFR AFRICAN AMERICAN: >59
GFR NON-AFRICAN AMERICAN: >60
GLUCOSE BLD-MCNC: 106 MG/DL (ref 74–109)
HCT VFR BLD CALC: 48.6 % (ref 42–52)
HDLC SERPL-MCNC: 46 MG/DL (ref 55–121)
HEMOGLOBIN: 16.3 G/DL (ref 14–18)
LDL CHOLESTEROL CALCULATED: 60 MG/DL
MCH RBC QN AUTO: 34.4 PG (ref 27–31)
MCHC RBC AUTO-ENTMCNC: 33.5 G/DL (ref 33–37)
MCV RBC AUTO: 102.5 FL (ref 80–94)
PDW BLD-RTO: 12.9 % (ref 11.5–14.5)
PLATELET # BLD: 194 K/UL (ref 130–400)
PMV BLD AUTO: 9.6 FL (ref 9.4–12.4)
POTASSIUM REFLEX MAGNESIUM: 4.3 MMOL/L (ref 3.5–5)
RBC # BLD: 4.74 M/UL (ref 4.7–6.1)
SODIUM BLD-SCNC: 138 MMOL/L (ref 136–145)
TOTAL PROTEIN: 6.6 G/DL (ref 6.6–8.7)
TRIGL SERPL-MCNC: 138 MG/DL (ref 0–149)
WBC # BLD: 7.9 K/UL (ref 4.8–10.8)

## 2021-11-16 PROCEDURE — 36415 COLL VENOUS BLD VENIPUNCTURE: CPT

## 2021-11-16 PROCEDURE — 80061 LIPID PANEL: CPT

## 2021-11-16 PROCEDURE — C1769 GUIDE WIRE: HCPCS

## 2021-11-16 PROCEDURE — 6370000000 HC RX 637 (ALT 250 FOR IP): Performed by: INTERNAL MEDICINE

## 2021-11-16 PROCEDURE — 93010 ELECTROCARDIOGRAM REPORT: CPT | Performed by: INTERNAL MEDICINE

## 2021-11-16 PROCEDURE — 6360000002 HC RX W HCPCS

## 2021-11-16 PROCEDURE — 99152 MOD SED SAME PHYS/QHP 5/>YRS: CPT

## 2021-11-16 PROCEDURE — 80053 COMPREHEN METABOLIC PANEL: CPT

## 2021-11-16 PROCEDURE — 85027 COMPLETE CBC AUTOMATED: CPT

## 2021-11-16 PROCEDURE — 99024 POSTOP FOLLOW-UP VISIT: CPT | Performed by: INTERNAL MEDICINE

## 2021-11-16 PROCEDURE — 93459 L HRT ART/GRFT ANGIO: CPT | Performed by: INTERNAL MEDICINE

## 2021-11-16 PROCEDURE — 2500000003 HC RX 250 WO HCPCS

## 2021-11-16 PROCEDURE — 93459 L HRT ART/GRFT ANGIO: CPT

## 2021-11-16 PROCEDURE — 2580000003 HC RX 258: Performed by: INTERNAL MEDICINE

## 2021-11-16 PROCEDURE — 93005 ELECTROCARDIOGRAM TRACING: CPT | Performed by: INTERNAL MEDICINE

## 2021-11-16 PROCEDURE — 99219 PR INITIAL OBSERVATION CARE/DAY 50 MINUTES: CPT | Performed by: INTERNAL MEDICINE

## 2021-11-16 PROCEDURE — 71046 X-RAY EXAM CHEST 2 VIEWS: CPT

## 2021-11-16 PROCEDURE — 99152 MOD SED SAME PHYS/QHP 5/>YRS: CPT | Performed by: INTERNAL MEDICINE

## 2021-11-16 PROCEDURE — 99153 MOD SED SAME PHYS/QHP EA: CPT

## 2021-11-16 PROCEDURE — 6360000004 HC RX CONTRAST MEDICATION: Performed by: INTERNAL MEDICINE

## 2021-11-16 PROCEDURE — C1894 INTRO/SHEATH, NON-LASER: HCPCS

## 2021-11-16 PROCEDURE — 2709999900 HC NON-CHARGEABLE SUPPLY

## 2021-11-16 RX ORDER — SODIUM CHLORIDE 9 MG/ML
INJECTION, SOLUTION INTRAVENOUS CONTINUOUS
Status: DISCONTINUED | OUTPATIENT
Start: 2021-11-16 | End: 2021-11-16 | Stop reason: HOSPADM

## 2021-11-16 RX ORDER — METOPROLOL SUCCINATE 25 MG/1
25 TABLET, EXTENDED RELEASE ORAL DAILY
Qty: 15 TABLET | Refills: 5 | Status: SHIPPED | OUTPATIENT
Start: 2021-11-16 | End: 2022-10-10 | Stop reason: SDUPTHER

## 2021-11-16 RX ORDER — ASPIRIN 81 MG/1
81 TABLET ORAL ONCE
Status: COMPLETED | OUTPATIENT
Start: 2021-11-16 | End: 2021-11-16

## 2021-11-16 RX ORDER — ONDANSETRON 2 MG/ML
4 INJECTION INTRAMUSCULAR; INTRAVENOUS EVERY 6 HOURS PRN
Status: DISCONTINUED | OUTPATIENT
Start: 2021-11-16 | End: 2021-11-16 | Stop reason: HOSPADM

## 2021-11-16 RX ADMIN — SODIUM CHLORIDE: 9 INJECTION, SOLUTION INTRAVENOUS at 08:06

## 2021-11-16 RX ADMIN — IOPAMIDOL 250 ML: 612 INJECTION, SOLUTION INTRAVENOUS at 10:16

## 2021-11-16 RX ADMIN — ASPIRIN 81 MG: 81 TABLET, COATED ORAL at 08:16

## 2021-11-16 NOTE — PROGRESS NOTES
Patient discharged in stable condition with wife. Transferred via wheelchair to front entrance for  per wife. Denies any c/o pain. Left wrist site clear.

## 2021-11-16 NOTE — PROGRESS NOTES
Cardiac catheterization preliminary note left radial artery access tolerated well left ventricular function satisfactory LIMA graft sequential to diagonal and LAD patent. Vein graft to right coronary artery patent. Medical management recommended tolerated well.

## 2021-11-16 NOTE — PROGRESS NOTES
Returned post cath. Awake and alert. Puncture site to left wrist clear with vasc band on with 10 ml air. Denies any c/o pain. Wife at bedside.

## 2021-11-16 NOTE — PROGRESS NOTES
Patient and wife instructed on care of left wrist post cath. Given written instructions. Both stated understanding.

## 2021-11-16 NOTE — H&P
Office Visit    2021  [facility], APRN - NP    Nurse Practitioner Adult [facility]  Coronary artery disease involving native coronary artery of native heart without angina pectoris +2 more    Dx  6 Month Follow-Up    Reason for Visit       Progress Notes  NAYA Edmonds NP (Nurse Practitioner) [facility] Nurse Practitioner [facility]. [facility]. Cardiology   Established Patient Office Visit  [address]  [phone]           OFFICE VISIT:  2021     Aquilino Moyer - : 1945     Reason For Visit:  Eve Schmid is a 67 y.o. male who is here for 6 Month Follow-Up     1. Coronary artery disease involving native coronary artery of native heart without angina pectoris    2. Essential hypertension    3. Hyperlipidemia, unspecified hyperlipidemia type          Patient presents to clinic today for 6-month follow-up. Patient with a history of coronary artery disease/CABG, hypertension and hyperlipidemia.     He is a patient of Dr. Carlos Johnston.     Patient presents to clinic today with complaint of chest pain. This has been occurring couple times a week for the last couple months. Pain located left side of his chest.  There is no radiation. Pain is lasting about 5 minutes. There has been no associated shortness of breath or lightheadedness.   Pain is similar to his anginal equivalent.     Subjective     Aquilino Moyer is a 67 y.o. male with the following history as recorded in [facility]:          Patient Active Problem List     Diagnosis Date Noted    Hypercholesterolemia 2020    Coronary artery disease involving native coronary artery of native heart 2020    Hx of CABG 2020    Abnormal stress test 2020    Mild cognitive impairment 2021    Chest pain 2020    Mixed hyperlipidemia 2020    Fatigue 2019    Obstructive sleep apnea      CPAP (continuous positive airway pressure) dependence      Sleep apnea, obstructive 08/14/2018    REM sleep behavior disorder 08/14/2018    Memory loss 08/14/2018    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      Current Facility-Administered Medications          Current Outpatient Medications   Medication Sig Dispense Refill    memantine ER (NAMENDA XR) 28 MG CP24 extended release capsule Take 1 capsule by mouth daily 30 capsule 11    atorvastatin (LIPITOR) 20 MG tablet Take 1 tablet by mouth daily 90 tablet 3    metoprolol succinate (TOPROL XL) 25 MG extended release tablet TAKE 1/2 TABLET BY MOUTH DAILY 15 tablet 5    potassium citrate (UROCIT-K) 10 MEQ (1080 MG) extended release tablet Take by mouth 3 times daily (with meals)        hydroxyurea (HYDREA) 500 MG chemo capsule Take by mouth daily         potassium chloride (KLOR-CON) 20 MEQ packet Take 10 mEq by mouth 3 times daily        memantine (NAMENDA) 10 MG tablet Take 10 mg by mouth nightly        tamsulosin (FLOMAX) 0.4 MG capsule Take 0.4 mg by mouth daily        zinc gluconate 50 MG tablet Take 50 mg by mouth daily        Coenzyme Q10 (Q-10 CO-ENZYME PO) Take 200 mg by mouth daily        Probiotic Product (PROBIOTIC DAILY PO) Take 0.5 mg by mouth        Melatonin 10 MG TABS Take by mouth nightly         clonazePAM (KLONOPIN) 0.5 MG tablet Take 0.5 mg by mouth nightly. Indications: patient takes 1/2 tablet        nitroGLYCERIN (NITROSTAT) 0.4 MG SL tablet Place 1 tablet under the tongue as needed for Chest pain 25 tablet 3    aspirin EC 81 MG EC tablet Take 81 mg by mouth nightly        latanoprost (XALATAN) 0.005 % ophthalmic solution Place 0.005 drops into both eyes nightly   6      No current facility-administered medications for this visit.         Allergies: Patient has no known allergies.   Past Medical History        Past Medical History:   Diagnosis Date    Acute sinusitis      Arthritis      Atherosclerosis of coronary artery bypass graft(s) without angina pectoris      Bone pain      Cardiovascular disease      Chest pain       pressure    CPAP (continuous positive airway pressure) dependence 04/2018     8cm    Diarrhea      Dyspnea on exertion      ED (erectile dysfunction)      Glaucoma       pre glaucoma    Hyperlipidemia      Hypertension      Hypoglycemia, unspecified      Left shoulder pain      Low back pain      Memory disorder      Nocturia      Obstructive sleep apnea       AHI:  47 per [facility] in [place], 3/2018    Other fatigue      Polycythemia      REM sleep behavior disorder      Sleep terrors      Unstable angina (HCC)           Past Surgical History         Past Surgical History:   Procedure Laterality Date    APPENDECTOMY        CORONARY ARTERY BYPASS GRAFT        CORONARY ARTERY BYPASS GRAFT         aortofemoral    DIAGNOSTIC CARDIAC CATH LAB PROCEDURE        HERNIA REPAIR        HIP ARTHROPLASTY Right      LITHOTRIPSY             Family History         Family History   Problem Relation Age of Onset    No Known Problems Mother      Heart Disease Father           Social History           Tobacco Use    Smoking status: Never Smoker    Smokeless tobacco: Never Used   Substance Use Topics    Alcohol use: No            Review of Systems:     Review of Systems   Constitutional: Positive for activity change. Negative for chills, diaphoresis, fatigue and fever. HENT: Negative for congestion and sore throat. Respiratory: Negative for cough, chest tightness, shortness of breath and wheezing. Cardiovascular: Positive for chest pain. Negative for palpitations and leg swelling.    Neurological: Negative for dizziness, syncope, light-headedness and headaches.         Objective:     /62   Pulse 61   Ht 5' 6\" (1.676 m)   Wt 195 lb (88.5 kg)   SpO2 96%   BMI 31.47 kg/m²      GENERAL - well developed and well nourished, conversant  HEENT -  PERRLA, Hearing appears normal, gentleman lids are normal. External inspection of ears and nose appear normal.  NECK - no thyromegaly, no JVD, trachea is in the midline  CARDIOVASCULAR - PMI is in the mid line clavicular position, Normal S1 and S2 with no systolic murmur. No S3 or S4    PULMONARY - no respiratory distress. No wheezes or rales. Lungs are clear to ausculation, normal respiratory effort. ABDOMEN  - soft, non tender, no rebound  MUSCULOSKELETAL  - range of motion of the upper and lower extermites appears normal and equal and is without pain   EXTREMITIES - no significant edema   NEUROLOGIC - gait and station are normal  SKIN - turgor is normal, can warm and dry. PSYCHIATRIC - normal mood and affect, alert and orientated x 3,        ASSESSMENT:     ALL THE CARDIOLOGY PROBLEMS ARE LISTED ABOVE; HOWEVER, THE FOLLOWING SPECIFIC CARDIAC PROBLEMS / CONDITIONS WERE ADDRESSED AND TREATED DURING THE OFFICE VISIT TODAY:                                                                                              MEDICAL DECISION MAKING                     Cardiac Specific Problem / Diagnosis   Discussion and Data Reviewed Diagnostic Procedures Ordered Management Options Selected                 1. Chest Pain   Chief complaint    Review and summation of old records:     Patient had a 2D echo in July showing a further reduced EF to 40 to 45%. Patient is on Lipitor, Toprol, aspirin. Chest pain occurring more frequently now. We will schedule cardiac catheterization with Dr. Augustine Bautista. Yes Continue current medications:      Yes                 2. Hypertension  Well Controlled    Blood pressure in the office today 116/62. O2 sat 96%. Patient is on Toprol-XL 25 mg daily. No Continue current medications:     Yes                 3. Hyperlipidemia Stable  patient is on Lipitor 20 mg daily. No Continue current medications:        Yes                 4.    CAD Shows no change  given patient's history of CAD/CABG and recurrent anginal equivalent will order cardiac catheterization. Patient is on Lipitor, Toprol, and aspirin. Yes Continue current medications:        Yes      No orders of the defined types were placed in this encounter.       Encounter Medications    No orders of the defined types were placed in this encounter.         Discussed with patient.     No follow-ups on file.     I greatly appreciate the opportunity to meet Tammi Ramsey and your confidence in allowing me to participate in his cardiovascular care.     NAYA Stevens NP  11/4/2021 9:14 AM CDT                      This dictation was generated by voice recognition computer software. Although all attempts are made to edit dictation for accuracy, there may be errors in the transcription that are not intended. Here for elective diagnostic catheterization advise indication alternatives benefits and risk patient agreeable plan today. I have discussed with the patient regarding indications for the proposed procedure LEFT HEART CATHETERIZATION AND POSSIBLE PERCUTANEOUS INTERVENTION  along with possible alternatives benefits and risks including but not limited to risks of death, myocardial infarction, stroke, contrast induced nephropathy which in some cases may lead to acute kidney failure requiring dialysis, allergic reactions, bleeding requiring blood transfusion,  cardiac arrhthymias, respiratory failure which may require placing the patient on respiratory support such as a ventilator or breathing machine,risk of complications which may require vascular surgery, and if coronary intervention is performed emergency CABG may be required in less than 1% of cases. The patient is awake and alert and understands the issues involved and indicates willingness to proceed as ordered. The patient does not have any contraindications to dual antiplatelet therapy. The patient does not have any known  pending surgical procedures in the next 12 months at this time.     The patient is  a reasonable candidate for moderate conscious sedation.     ASA score:  ASA 3 - Patient with moderate systemic disease with functional limitations    Mallampati: I (soft palate, uvula, fauces, tonsillar pillars visible)    Preferred vascular access site will be: right radial artery

## 2021-11-16 NOTE — PROGRESS NOTES
Vasc band removed and site cleansed with Prevantics swab then gauze and tegaderm dressing applied. . Site clear.

## 2021-12-17 ENCOUNTER — OFFICE VISIT (OUTPATIENT)
Dept: CARDIOLOGY CLINIC | Age: 76
End: 2021-12-17
Payer: MEDICARE

## 2021-12-17 VITALS
OXYGEN SATURATION: 96 % | WEIGHT: 196 LBS | HEART RATE: 58 BPM | SYSTOLIC BLOOD PRESSURE: 118 MMHG | DIASTOLIC BLOOD PRESSURE: 62 MMHG | BODY MASS INDEX: 31.5 KG/M2 | HEIGHT: 66 IN

## 2021-12-17 DIAGNOSIS — I25.10 CORONARY ARTERY DISEASE INVOLVING NATIVE CORONARY ARTERY OF NATIVE HEART WITHOUT ANGINA PECTORIS: Primary | ICD-10-CM

## 2021-12-17 DIAGNOSIS — I10 ESSENTIAL HYPERTENSION: ICD-10-CM

## 2021-12-17 DIAGNOSIS — E78.5 HYPERLIPIDEMIA, UNSPECIFIED HYPERLIPIDEMIA TYPE: ICD-10-CM

## 2021-12-17 PROCEDURE — 99214 OFFICE O/P EST MOD 30 MIN: CPT | Performed by: NURSE PRACTITIONER

## 2021-12-17 PROCEDURE — 1123F ACP DISCUSS/DSCN MKR DOCD: CPT | Performed by: NURSE PRACTITIONER

## 2021-12-17 PROCEDURE — G8427 DOCREV CUR MEDS BY ELIG CLIN: HCPCS | Performed by: NURSE PRACTITIONER

## 2021-12-17 PROCEDURE — 1036F TOBACCO NON-USER: CPT | Performed by: NURSE PRACTITIONER

## 2021-12-17 PROCEDURE — G8484 FLU IMMUNIZE NO ADMIN: HCPCS | Performed by: NURSE PRACTITIONER

## 2021-12-17 PROCEDURE — 4040F PNEUMOC VAC/ADMIN/RCVD: CPT | Performed by: NURSE PRACTITIONER

## 2021-12-17 PROCEDURE — G8417 CALC BMI ABV UP PARAM F/U: HCPCS | Performed by: NURSE PRACTITIONER

## 2021-12-17 RX ORDER — TIMOLOL MALEATE 5 MG/ML
1 SOLUTION/ DROPS OPHTHALMIC 2 TIMES DAILY
COMMUNITY

## 2021-12-17 ASSESSMENT — ENCOUNTER SYMPTOMS
CHEST TIGHTNESS: 0
SORE THROAT: 0
SHORTNESS OF BREATH: 0
COUGH: 0
WHEEZING: 0

## 2021-12-17 NOTE — PROGRESS NOTES
[facility] Cardiology   Established Patient Office Visit   [facility]. [address]  [phone]        OFFICE VISIT:  2021    Bud Walker - : 1945    Reason For Visit:  Rickie Allen is a 67 y.o. male who is here for Follow-up    1. Coronary artery disease involving native coronary artery of native heart without angina pectoris    2. Essential hypertension    3. Hyperlipidemia, unspecified hyperlipidemia type      Patient with a history of coronary artery disease/CABG, hypertension and hyperlipidemia. He is a patient of Dr. Zoila Mccloud. Patient was seen in our office on 2021 with complaints of chest pain. 2D echo showing a reduced EF to 40 to 45%. Patient was scheduled for diagnostic cardiac catheterization with Dr. Zoila Mccloud. Cardiac catheterization 2021 revealed:  Normal LV systolic function. Small area apical akinesis. Subtotal mid LAD   Patent LIMA graft to diagonal sequential to distal LAD   70% ostial diagonal   60% distal circumflex small vessel   90% proximal RCA with patent SVG distal RCA      Recommendations      Routine Post Diagnostic Cath orders. Risk factor modification. Medical management. Signatures      ----------------------------------------------------------------   Electronically signed by Milton Fung MD(Performing   Physician) on 2021 12:31   ----------------------------------------------------------------     Patient presents to clinic today for hospital follow-up. Denies any chest pain, pressure or tightness. There is no shortness of breath, orthopnea or PND. Patient denies any lightheadedness, dizziness or syncope.         Subjective    Bud Walker is a 67 y.o. male with the following history as recorded in [facility]:    Patient Active Problem List    Diagnosis Date Noted    Unstable angina ([address]     Hypercholesterolemia 2020    Coronary artery disease involving native coronary artery of native heart 01/16/2020    Hx of CABG 01/16/2020    Abnormal stress test 01/16/2020    Mild cognitive impairment 04/30/2021    Chest pain 07/29/2020    Mixed hyperlipidemia 07/29/2020    Fatigue 06/24/2019    Obstructive sleep apnea     CPAP (continuous positive airway pressure) dependence     Sleep apnea, obstructive 08/14/2018    REM sleep behavior disorder 08/14/2018    Memory loss 08/14/2018    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     Current Outpatient Medications   Medication Sig Dispense Refill    timolol (TIMOPTIC) 0.5 % ophthalmic solution 1 drop 2 times daily      metoprolol succinate (TOPROL XL) 25 MG extended release tablet Take 1 tablet by mouth daily 15 tablet 5    memantine ER (NAMENDA XR) 28 MG CP24 extended release capsule Take 1 capsule by mouth daily 30 capsule 11    atorvastatin (LIPITOR) 20 MG tablet Take 1 tablet by mouth daily 90 tablet 3    potassium citrate (UROCIT-K) 10 MEQ (1080 MG) extended release tablet Take by mouth 3 times daily (with meals)      hydroxyurea (HYDREA) 500 MG chemo capsule Take by mouth daily ON WEEKENDS PATIENT TAKES 2 PILLS      tamsulosin (FLOMAX) 0.4 MG capsule Take 0.4 mg by mouth daily      zinc gluconate 50 MG tablet Take 50 mg by mouth daily      Coenzyme Q10 (Q-10 CO-ENZYME PO) Take 200 mg by mouth daily      Melatonin 10 MG TABS Take by mouth nightly       clonazePAM (KLONOPIN) 0.5 MG tablet Take 0.5 mg by mouth nightly. Indications: patient takes 1/2 tablet      nitroGLYCERIN (NITROSTAT) 0.4 MG SL tablet Place 1 tablet under the tongue as needed for Chest pain 25 tablet 3    aspirin EC 81 MG EC tablet Take 81 mg by mouth nightly      latanoprost (XALATAN) 0.005 % ophthalmic solution Place 1 drop into both eyes nightly   6     No current facility-administered medications for this visit. Allergies: Patient has no known allergies.   Past Medical History:   Diagnosis Date    Acute sinusitis     Arthritis     Atherosclerosis of coronary artery bypass graft(s) without angina pectoris     Bone pain     BPH (benign prostatic hyperplasia)     Cardiovascular disease     Chest pain     pressure    CPAP (continuous positive airway pressure) dependence 04/2018    8cm    Diarrhea     Dyspnea on exertion     ED (erectile dysfunction)     Glaucoma     pre glaucoma    Hyperlipidemia     Hypertension     Hypoglycemia, unspecified     Kidney stones     Left shoulder pain     Low back pain     Memory disorder     Nocturia     Obstructive sleep apnea     AHI:  47 per [facility] in [place], 3/2018    Other fatigue     Polycythemia     REM sleep behavior disorder     Sleep terrors     Unstable angina (HCC)      Past Surgical History:   Procedure Laterality Date    APPENDECTOMY      CORONARY ARTERY BYPASS GRAFT      CORONARY ARTERY BYPASS GRAFT      aortofemoral    DIAGNOSTIC CARDIAC CATH LAB PROCEDURE      HERNIA REPAIR      HIP ARTHROPLASTY Right     LITHOTRIPSY       Family History   Problem Relation Age of Onset    No Known Problems Mother     Heart Disease Father      Social History     Tobacco Use    Smoking status: Never Smoker    Smokeless tobacco: Never Used   Substance Use Topics    Alcohol use: No          Review of Systems:    Review of Systems   Constitutional: Negative for activity change, chills, diaphoresis, fatigue and fever. HENT: Negative for congestion and sore throat. Respiratory: Negative for cough, chest tightness, shortness of breath and wheezing. Cardiovascular: Negative for chest pain, palpitations and leg swelling. Neurological: Negative for dizziness, syncope, light-headedness and headaches. Psychiatric/Behavioral: The patient is not nervous/anxious.          Objective:    /62   Pulse 58   Ht 5' 6\" (1.676 m)   Wt 196 lb (88.9 kg)   SpO2 96%   BMI 31.64 kg/m²     GENERAL - well developed and well nourished, conversant  HEENT -  PERRLA, Hearing appears normal, gentleman lids are normal.  External inspection of ears and nose appear normal.  NECK - no thyromegaly, no JVD, trachea is in the midline  CARDIOVASCULAR - PMI is in the mid line clavicular position, Normal S1 and S2 with no systolic murmur. No S3 or S4    PULMONARY - no respiratory distress. No wheezes or rales. Lungs are clear to ausculation, normal respiratory effort. ABDOMEN  - soft, non tender, no rebound  MUSCULOSKELETAL  - range of motion of the upper and lower extermites appears normal and equal and is without pain   EXTREMITIES - no significant edema   NEUROLOGIC - gait and station are normal  SKIN - turgor is normal, can warm and dry. PSYCHIATRIC - normal mood and affect, alert and orientated x 3,      ASSESSMENT:    ALL THE CARDIOLOGY PROBLEMS ARE LISTED ABOVE; HOWEVER, THE FOLLOWING SPECIFIC CARDIAC PROBLEMS / CONDITIONS WERE ADDRESSED AND TREATED DURING THE OFFICE VISIT TODAY:                                                                                            MEDICAL DECISION MAKING             Cardiac Specific Problem / Diagnosis  Discussion and Data Reviewed Diagnostic Procedures Ordered Management Options Selected           1. CAD  Stable   Review and summation of old records:    No chest pain patient is on Lipitor, Toprol, aspirin. No Continue current medications:     Yes           2. Hypertension  Well Controlled   Blood pressure in the office today 118/62. O2 sat 96%. Patient is on Toprol-XL 25 mg daily. No Continue current medications:    Yes           3. Hyperlipidemia Stable  patient is on Lipitor 20 mg daily. No Continue current medications: Yes                     No orders of the defined types were placed in this encounter. No orders of the defined types were placed in this encounter. Discussed with patient and spouse. Return in about 3 months (around 3/17/2022) for Dr Tri Carbajal .     I greatly appreciate the opportunity to meet Theodore Richardson and your confidence in allowing me to participate in his cardiovascular care. NAYA Plummer NP  12/17/2021 9:02 AM CST                    This dictation was generated by voice recognition computer software. Although all attempts are made to edit dictation for accuracy, there may be errors in the transcription that are not intended.

## 2022-03-31 ENCOUNTER — OFFICE VISIT (OUTPATIENT)
Dept: CARDIOLOGY CLINIC | Age: 77
End: 2022-03-31
Payer: MEDICARE

## 2022-03-31 VITALS
HEIGHT: 66 IN | BODY MASS INDEX: 31.82 KG/M2 | SYSTOLIC BLOOD PRESSURE: 132 MMHG | HEART RATE: 69 BPM | WEIGHT: 198 LBS | DIASTOLIC BLOOD PRESSURE: 82 MMHG | OXYGEN SATURATION: 96 %

## 2022-03-31 DIAGNOSIS — E78.00 HYPERCHOLESTEROLEMIA: ICD-10-CM

## 2022-03-31 DIAGNOSIS — R94.39 ABNORMAL STRESS TEST: ICD-10-CM

## 2022-03-31 DIAGNOSIS — I25.10 CORONARY ARTERY DISEASE INVOLVING NATIVE CORONARY ARTERY OF NATIVE HEART WITHOUT ANGINA PECTORIS: Primary | ICD-10-CM

## 2022-03-31 DIAGNOSIS — E78.2 MIXED HYPERLIPIDEMIA: ICD-10-CM

## 2022-03-31 DIAGNOSIS — Z95.1 HX OF CABG: ICD-10-CM

## 2022-03-31 DIAGNOSIS — I20.8 OTHER FORMS OF ANGINA PECTORIS (HCC): ICD-10-CM

## 2022-03-31 PROCEDURE — G8417 CALC BMI ABV UP PARAM F/U: HCPCS | Performed by: INTERNAL MEDICINE

## 2022-03-31 PROCEDURE — G8427 DOCREV CUR MEDS BY ELIG CLIN: HCPCS | Performed by: INTERNAL MEDICINE

## 2022-03-31 PROCEDURE — 1036F TOBACCO NON-USER: CPT | Performed by: INTERNAL MEDICINE

## 2022-03-31 PROCEDURE — 99214 OFFICE O/P EST MOD 30 MIN: CPT | Performed by: INTERNAL MEDICINE

## 2022-03-31 PROCEDURE — G8484 FLU IMMUNIZE NO ADMIN: HCPCS | Performed by: INTERNAL MEDICINE

## 2022-03-31 PROCEDURE — 1123F ACP DISCUSS/DSCN MKR DOCD: CPT | Performed by: INTERNAL MEDICINE

## 2022-03-31 PROCEDURE — 4040F PNEUMOC VAC/ADMIN/RCVD: CPT | Performed by: INTERNAL MEDICINE

## 2022-03-31 ASSESSMENT — ENCOUNTER SYMPTOMS
SHORTNESS OF BREATH: 0
NAUSEA: 0
EYES NEGATIVE: 1
GASTROINTESTINAL NEGATIVE: 1
DIARRHEA: 0
VOMITING: 0
RESPIRATORY NEGATIVE: 1

## 2022-03-31 NOTE — PROGRESS NOTES
[facility] Progress Note                            Date:  3/31/2022  Patient: Renita Ghotra  Age:  67 y.o., 1945      Reason for evaluation:         SUBJECTIVE:    Returns today follow-up assessment coronary disease previous CABG hyperlipidemia. Most recent LDL cholesterol November was 60. Does not smoke. Denies anginal chest pain or limiting dyspnea. He has had some bilateral calf discomfort on occasion. Bilateral plethysmography that I ordered 5/13/2021 reveals mildly diminished flow bilateral lower extremity arterial system at rest small drop in ABIs after exercise. Wife reports he has on occasion recently become disoriented particularly when she and him were driving outside of the area occasionally in the local area I suggested that it might be advantageous for her to speak further with this with his neurologist and perhaps to consider restricting his driving to just local driving only during daylight hours. Review of Systems   Constitutional: Negative. Negative for chills, fever and unexpected weight change. HENT: Negative. Eyes: Negative. Respiratory: Negative. Negative for shortness of breath. Cardiovascular: Negative. Negative for chest pain. Gastrointestinal: Negative. Negative for diarrhea, nausea and vomiting. Endocrine: Negative. Genitourinary: Negative. Musculoskeletal: Negative. Skin: Negative. Neurological: Negative. All other systems reviewed and are negative. OBJECTIVE:     /82   Pulse 69   Ht 5' 6\" (1.676 m)   Wt 198 lb (89.8 kg)   SpO2 96%   BMI 31.96 kg/m²     Labs:   CBC: No results for input(s): WBC, HGB, HCT, PLT in the last 72 hours. BMP:No results for input(s): NA, K, CO2, BUN, CREATININE, LABGLOM, GLUCOSE in the last 72 hours. BNP: No results for input(s): BNP in the last 72 hours. PT/INR: No results for input(s): PROTIME, INR in the last 72 hours. APTT:No results for input(s):  APTT in the last 72 hours.  CARDIAC ENZYMES:No results for input(s): CKTOTAL, CKMB, CKMBINDEX, TROPONINI in the last 72 hours. FASTING LIPID PANEL:  Lab Results   Component Value Date    HDL 46 11/16/2021    LDLCALC 60 11/16/2021    TRIG 138 11/16/2021     LIVER PROFILE:No results for input(s): AST, ALT, LABALBU in the last 72 hours.         Past Medical History:   Diagnosis Date    Acute sinusitis     Arthritis     Atherosclerosis of coronary artery bypass graft(s) without angina pectoris     Bone pain     BPH (benign prostatic hyperplasia)     Cardiovascular disease     Chest pain     pressure    CPAP (continuous positive airway pressure) dependence 04/2018    8cm    Diarrhea     Dyspnea on exertion     ED (erectile dysfunction)     Glaucoma     pre glaucoma    Hyperlipidemia     Hypertension     Hypoglycemia, unspecified     Kidney stones     Left shoulder pain     Low back pain     Memory disorder     Nocturia     Obstructive sleep apnea     AHI:  47 per [facility] in [place], 3/2018    Other fatigue     Polycythemia     REM sleep behavior disorder     Sleep terrors     Unstable angina (HCC)      Past Surgical History:   Procedure Laterality Date    APPENDECTOMY      CORONARY ARTERY BYPASS GRAFT      CORONARY ARTERY BYPASS GRAFT      aortofemoral    DIAGNOSTIC CARDIAC CATH LAB PROCEDURE      HERNIA REPAIR      HIP ARTHROPLASTY Right     LITHOTRIPSY       Family History   Problem Relation Age of Onset    No Known Problems Mother     Heart Disease Father      No Known Allergies  Current Outpatient Medications   Medication Sig Dispense Refill    timolol (TIMOPTIC) 0.5 % ophthalmic solution 1 drop 2 times daily      metoprolol succinate (TOPROL XL) 25 MG extended release tablet Take 1 tablet by mouth daily 15 tablet 5    memantine ER (NAMENDA XR) 28 MG CP24 extended release capsule Take 1 capsule by mouth daily 30 capsule 11    atorvastatin (LIPITOR) 20 MG tablet Take 1 tablet by mouth daily 90 tablet 3  potassium citrate (UROCIT-K) 10 MEQ (1080 MG) extended release tablet Take by mouth 3 times daily (with meals)      hydroxyurea (HYDREA) 500 MG chemo capsule Take 500 mg by mouth 2 times daily       tamsulosin (FLOMAX) 0.4 MG capsule Take 0.4 mg by mouth daily      zinc gluconate 50 MG tablet Take 50 mg by mouth daily      Coenzyme Q10 (Q-10 CO-ENZYME PO) Take 200 mg by mouth daily      Melatonin 10 MG TABS Take by mouth nightly       clonazePAM (KLONOPIN) 0.5 MG tablet Take 0.5 mg by mouth nightly. Indications: patient takes 1/2 tablet      nitroGLYCERIN (NITROSTAT) 0.4 MG SL tablet Place 1 tablet under the tongue as needed for Chest pain 25 tablet 3    aspirin EC 81 MG EC tablet Take 81 mg by mouth nightly      latanoprost (XALATAN) 0.005 % ophthalmic solution Place 1 drop into both eyes nightly   6     No current facility-administered medications for this visit. 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      Social Determinants of Health     Financial Resource Strain:     Difficulty of Paying Living Expenses: Not on file   Food Insecurity:     Worried About Running Out of Food in the Last Year: Not on file    Ran Out of Food in the Last Year: Not on file   Transportation Needs:     Lack of Transportation (Medical): Not on file    Lack of Transportation (Non-Medical): Not on file   Physical Activity:     Days of Exercise per Week: Not on file    Minutes of Exercise per Session: Not on file   Stress:     Feeling of Stress : Not on file   Social Connections:     Frequency of Communication with Friends and Family: Not on file    Frequency of Social Gatherings with Friends and Family: Not on file    Attends [employer] Services: Not on file    Active Member of [address] [place] [address] [place] [employer] or Organizations: Not on file    Attends Club or Organization Meetings: Not on file    Marital Status: Not on file   Intimate Partner Violence:     Fear of Current or Ex-Partner: Not on file    Emotionally Abused: Not on file    Physically Abused: Not on file    Sexually Abused: Not on file   Housing Stability:     Unable to Pay for Housing in the Last Year: Not on file    Number of [place] in the Last Year: Not on file    Unstable Housing in the Last Year: Not on file       Physical Examination:  /82   Pulse 69   Ht 5' 6\" (1.676 m)   Wt 198 lb (89.8 kg)   SpO2 96%   BMI 31.96 kg/m²   Physical Exam  Vitals reviewed. Constitutional:       Appearance: He is well-developed. Neck:      Vascular: No carotid bruit or JVD. Cardiovascular:      Rate and Rhythm: Normal rate and regular rhythm. Heart sounds: Normal heart sounds. No murmur heard. No friction rub. No gallop. Pulmonary:      Effort: Pulmonary effort is normal. No respiratory distress. Breath sounds: Normal breath sounds. No wheezing or rales. Abdominal:      General: There is no distension. Tenderness: There is no abdominal tenderness. Lymphadenopathy:      Cervical: No cervical adenopathy. Skin:     General: Skin is warm and dry. ASSESSMENT:     Diagnosis Orders   1.  Coronary artery disease involving native coronary artery of native heart without angina pectoris     2. Mixed hyperlipidemia     3. Abnormal stress test     4. Hx of CABG     5. Hypercholesterolemia     6. Other forms of angina pectoris ([address]         PLAN:  No orders of the defined types were placed in this encounter. No orders of the defined types were placed in this encounter. 1. Continue present medications  2. Fasting lipid profile  3. Recommend follow-up assessment in 6 months    Return in about 6 months (around 9/30/2022) for return to Dr. Adam Villa only. Ilana Smart MD 3/31/2022 2:32 PM CDT    [facility]      Thisdictation was generated by voice recognition computer software. Although all attempts are made to edit the dictation for accuracy, there may be errors in the transcription that are not intended.

## 2022-04-28 ENCOUNTER — OFFICE VISIT (OUTPATIENT)
Dept: NEUROLOGY | Age: 77
End: 2022-04-28
Payer: MEDICARE

## 2022-04-28 VITALS
BODY MASS INDEX: 30.53 KG/M2 | HEIGHT: 66 IN | SYSTOLIC BLOOD PRESSURE: 107 MMHG | DIASTOLIC BLOOD PRESSURE: 64 MMHG | RESPIRATION RATE: 18 BRPM | HEART RATE: 66 BPM | WEIGHT: 190 LBS

## 2022-04-28 DIAGNOSIS — R25.1 TREMOR: ICD-10-CM

## 2022-04-28 DIAGNOSIS — G47.52 REM SLEEP BEHAVIOR DISORDER: ICD-10-CM

## 2022-04-28 DIAGNOSIS — F03.90 DEMENTIA WITHOUT BEHAVIORAL DISTURBANCE, UNSPECIFIED DEMENTIA TYPE: Primary | ICD-10-CM

## 2022-04-28 DIAGNOSIS — G47.33 OBSTRUCTIVE SLEEP APNEA: ICD-10-CM

## 2022-04-28 DIAGNOSIS — R93.0 ABNORMAL MRI OF HEAD: ICD-10-CM

## 2022-04-28 PROCEDURE — 99214 OFFICE O/P EST MOD 30 MIN: CPT | Performed by: PSYCHIATRY & NEUROLOGY

## 2022-04-28 PROCEDURE — 1036F TOBACCO NON-USER: CPT | Performed by: PSYCHIATRY & NEUROLOGY

## 2022-04-28 PROCEDURE — 4040F PNEUMOC VAC/ADMIN/RCVD: CPT | Performed by: PSYCHIATRY & NEUROLOGY

## 2022-04-28 PROCEDURE — G8427 DOCREV CUR MEDS BY ELIG CLIN: HCPCS | Performed by: PSYCHIATRY & NEUROLOGY

## 2022-04-28 PROCEDURE — G8417 CALC BMI ABV UP PARAM F/U: HCPCS | Performed by: PSYCHIATRY & NEUROLOGY

## 2022-04-28 PROCEDURE — 1123F ACP DISCUSS/DSCN MKR DOCD: CPT | Performed by: PSYCHIATRY & NEUROLOGY

## 2022-04-28 RX ORDER — DONEPEZIL HYDROCHLORIDE 5 MG/1
5 TABLET, FILM COATED ORAL NIGHTLY
Qty: 30 TABLET | Refills: 5 | Status: SHIPPED | OUTPATIENT
Start: 2022-04-28 | End: 2022-10-10

## 2022-04-28 RX ORDER — MEMANTINE HYDROCHLORIDE 10 MG/1
10 TABLET ORAL DAILY
Qty: 60 TABLET | Refills: 5 | Status: SHIPPED | OUTPATIENT
Start: 2022-04-28

## 2022-04-28 NOTE — PROGRESS NOTES
[facility]  [address] [place] [address] Drive, [address] [place] [address]  Chad Smith  Phone [phone]  Fax [phone]     [facility] Follow Up Encounter  22 11:41 AM CDT    Information:   Patient Name: Glenis García  :   1945  Age:   67 y.o. MRN:   [id number]  Account #:  [de-identified]  Today:  22    Provider: Enrique Neely M.D. Chief Complaint:   Chief Complaint   Patient presents with    Follow-up    Sleep Apnea       Subjective:   Glenis García is a 67 y.o. man with a history of mild cognitive impairment, RSBD, and ANGE who is following up. His memory is worsening. He has had forgetfulness. He cannot remember the right word to use or find a synonym. He cannot remember instructions or what he went to get. He remains independent. He does take Namenda. He has noted a right hand intermittent tremor with pronation/supination movements. He mimics the tremor to show me with the pronation/supination movements. He has not had a left hand tremor. He has not noted dyscoordination. He has had no gait changes or impaired balance. He uses his CPAP nightly. He has had nearly nightly active dreams. He does take the clonazepam at bedtime but just 1/2 tablet.   He has tried melatonin in the past.      Objective:     Past Medical History:  Past Medical History:   Diagnosis Date    Acute sinusitis     Arthritis     Atherosclerosis of coronary artery bypass graft(s) without angina pectoris     Bone pain     BPH (benign prostatic hyperplasia)     Cardiovascular disease     Chest pain     pressure    CPAP (continuous positive airway pressure) dependence 2018    8cm    Diarrhea     Dyspnea on exertion     ED (erectile dysfunction)     Glaucoma     pre glaucoma    Hyperlipidemia     Hypertension     Hypoglycemia, unspecified     Kidney stones     Left shoulder pain     Low back pain     Memory disorder     Nocturia     Obstructive sleep apnea     AHI:  47 per [facility] in [place], 3/2018    Other fatigue     Polycythemia     REM sleep behavior disorder     Sleep terrors     Unstable angina (HCC)        Past Surgical History:   Procedure Laterality Date    APPENDECTOMY      CORONARY ARTERY BYPASS GRAFT      CORONARY ARTERY BYPASS GRAFT      aortofemoral    DIAGNOSTIC CARDIAC CATH LAB PROCEDURE      HERNIA REPAIR      HIP ARTHROPLASTY Right     LITHOTRIPSY         Recent Hospitalizations  · None    Significant Injuries  · None    Habits  Jenny Reed reports that he has never smoked. He has never used smokeless tobacco. He reports that he does not drink alcohol and does not use drugs. Family History   Problem Relation Age of Onset    No Known Problems Mother     Heart Disease Father        Social History  Enzo Leal is , lives [place], [place], and is retired. Medications:  Current Outpatient Medications   Medication Sig Dispense Refill    memantine (NAMENDA) 10 MG tablet Take 1 tablet by mouth daily 60 tablet 5    donepezil (ARICEPT) 5 MG tablet Take 1 tablet by mouth nightly 30 tablet 5    timolol (TIMOPTIC) 0.5 % ophthalmic solution 1 drop 2 times daily      metoprolol succinate (TOPROL XL) 25 MG extended release tablet Take 1 tablet by mouth daily 15 tablet 5    atorvastatin (LIPITOR) 20 MG tablet Take 1 tablet by mouth daily 90 tablet 3    potassium citrate (UROCIT-K) 10 MEQ (1080 MG) extended release tablet Take by mouth 3 times daily (with meals)      hydroxyurea (HYDREA) 500 MG chemo capsule Take 500 mg by mouth 2 times daily       tamsulosin (FLOMAX) 0.4 MG capsule Take 0.4 mg by mouth daily      zinc gluconate 50 MG tablet Take 50 mg by mouth daily      Coenzyme Q10 (Q-10 CO-ENZYME PO) Take 200 mg by mouth daily      Melatonin 10 MG TABS Take by mouth nightly       clonazePAM (KLONOPIN) 0.5 MG tablet Take 0.5 mg by mouth nightly.  Indications: patient takes 1/2 tablet      nitroGLYCERIN (NITROSTAT) 0.4 MG SL tablet Place 1 tablet under the tongue as needed for Chest pain 25 tablet 3    aspirin EC 81 MG EC tablet Take 81 mg by mouth nightly      latanoprost (XALATAN) 0.005 % ophthalmic solution Place 1 drop into both eyes nightly   6     No current facility-administered medications for this visit. Allergies: Allergies as of 04/28/2022    (No Known Allergies)       Review of Systems:  Constitutional: negative for - chills and fever  Eyes:  negative for - visual disturbance and photophobia  HENMT: negative for - headaches and sinus pain  Respiratory: negative for - cough, hemoptysis, and shortness of breath  Cardiovascular: negative for - chest pain and palpitations  Gastrointestinal: negative for - blood in stools, constipation, diarrhea, nausea, and vomiting  Genito-Urinary: negative for - hematuria, urinary frequency, urinary urgency, and urinary retention  Musculoskeletal: negative for - joint pain, joint stiffness, and joint swelling  Hematological and Lymphatic: negative for - bleeding problems, abnormal bruising, and swollen lymph nodes  Endocrine:  negative for - polydipsia and polyphagia  Allergy/Immunology:  negative for - rhinorrhea, sinus congestion, hives  Integument:  negative for - negative for - rash, change in moles, new or changing lesions  Psychological: negative for - anxiety and depression  Neurological: positive for - memory loss  Negative for - numbness/tingling, and weakness     PHYSICAL EXAMINATION:  Vitals:  /64   Pulse 66   Resp 18   Ht 5' 6\" (1.676 m)   Wt 190 lb (86.2 kg)   BMI 30.67 kg/m²   General appearance:  Alert, well developed, well nourished, in no distress  HEENT:  normocephalic, atraumatic, sclera appear normal, no nasal abnormalities, no rhinorrhea, Ears appear normal, oral mucous membranes are moist without erythema, trachea midline, thyroid is normal, no lymphadenopathy or neck mass. Cardiovascular:  Regular rate and rhythm without murmer. No peripheral edema, No cyanosis or clubbing.   No carotid bruits. Pulmonary:  Lungs are clear to auscultation. Breathing appears normal, good expansion, normal effort without use of accessory muscles  Musculoskeletal:  Joints are arthritic  Integument:  No rash, erythema, or pallor  Psychiatric:  Mood, affect, and behavior appear normal      NEUROLOGIC EXAMINATION:  Mental Status:  alert, oriented to person, place, and time. Speech:  Clear without dysarthria or dysphonia  Language:  Fluent without aphasia  Cranial Nerves:   II Visual fields are full to confrontation   III,IV, VI Extraocular movements are full   VII Facial movements are symmetrical without weakness   VIII Hearing is intact   IX,X Shoulder shrug and head rotation strength are intact   XII No tongue atrophy or fasciculations. Normal tongue protrusion. No tongue weakness  Motor:  Normal strength in both upper and lower extremities. Normal muscle tone and bulk. Deep tendon reflexes are intact and symmetrical in both upper and lower extremities. Bentley's signs are absent bilaterally. There is no ankle clonus on either side. Sensation:  Sensation is intact to light touch, temperature, and vibration in all extremities. Coordination:  Rapid alternating movements are normal in both upper and lower extremities. Finger to nose testing is unimpaired bilaterally. Gait:  Normal station and gait. [facility] Mental Status Exam    1. Orientation (8)  Name, address, current location (building), city, state, date (day), month, year:       7  2. Attention (7)  Digit span:  2-5-1-0;  6-1-3-7-2;  2-8-9-6-3-4;  2-9-9-1-3-0-6:       6  3. Immediate Recall (4)  \"Mr. Dueñas\";  \"Apple\"; \"Tunnel\"; \"Nanda\":       4  4. Information (4) President; first president; # of weeks per year; define an island:       4  5. Calculation (4) 12 + 13 (25), 32-8 (24), 39/3 (13), 13 X 5 (65):       3  6. Abstraction (3) Similarities:  orange/banana, horse/dog, table/bookcase:                      2  7.   Recall (4) The four words: \"Mr. Dueñas\";  \"Apple\"; \"Tunnel\"; \"Nanda\":       1         Total Score:   27/34   (max = 34)      Pertinent Diagnostic Studies:  CPAP download showed 87% compliance with auto CPAP at 6cm to 20cm with residual AHI of 1.2. He has a ResMed device that is 3years old. Narrative   MRI BRAIN WO CONTRAST 9/7/2018 8:37 AM   HISTORY: R41.3   Comparison: None.     Technique: Multiplanar imaging of the brain was performed in a routine   fashion without intravenous contrast.   Findings: There is no diffusion signal abnormality to suggest acute infarct. There appears to be mild generalized white matter volume loss in the   cerebral hemispheres. There does not appear to be significant small   vessel ischemic change or evidence of old cortical infarct. No lacunar   infarcts identified. Bilateral symmetric T2 hyperintensity in the   globus pallidus (series 5-image 13). No other areas of signal   abnormality identified. There is no intra-axial or extra-axial   hemorrhage. No visualized mass lesion on this noncontrast exam. Normal   size and configuration of the ventricular system for patient age. The   posterior fossa structures are unremarkable. There does not appear to   be obvious volume loss in the brainstem or cerebellar hemispheres. Incidentally noted small hippocampal cysts. The pituitary gland and   sella are unremarkable. The major intracranial flow voids are   preserved. The orbits are unremarkable. The paranasal sinuses and mastoids are   clear. Marrow signal in the calvarium and skull base appears normal.       Impression   Impression:    1. No acute intracranial process. 2. Mild generalized white matter loss in the cerebral hemispheres. There is not appear to be significant underlying vascular component. 3. There is symmetric hyperintensities in the globus pallidus, the   appearance of which is nonspecific.  A similar imaging pattern can be   seen with in the setting of prior carbon monoxide injury.  Globus   pallidus hyperintensities can also be seen in the setting of brain   iron accumulation although the appearance on this exam is not typical   for that entity. 4. No mass lesion identified. Signed by Dr Jefferson Velazco on 9/7/2018 4:10 PM       Assessment:       ICD-10-CM    1. Dementia without behavioral disturbance, unspecified dementia type ([address]  F03.90 MRI BRAIN WO CONTRAST   2. REM sleep behavior disorder  G47.52    3. Obstructive sleep apnea  G47.33    4. Tremor  R25.1    5. Abnormal MRI of head  R93.0 MRI BRAIN WO CONTRAST   His memory has worsened to the point of dementia. 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 He has developed a unilateral tremor suggesting that he is developing Parkinsonism or Parkinson's disease. Previous MRI head showed changes typical for carbon monoxide poisoning but he has no history of that. 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 Change the Namenda from the XR form to the regular form 10 mg twice a day  2. Start taking Aricept 5 mg nightly. We have the option of increasing it to 10 mg down the road. 3. Will have you get an MRI head  4. Can take a whole 0.5 mg of the clonazepam at bedtime to help the active dreams  5. Continue CPAP use during sleep  6.   FU in 6 months    Electronically signed by Jaskaran Thornton MD on 4/28/22

## 2022-04-28 NOTE — PATIENT INSTRUCTIONS
INSTRUCTIONS:  1. Change the Namenda from the XR form to the regular form 10 mg twice a day  2. Start taking Aricept 5 mg nightly. We have the option of increasing it to 10 mg down the road. 3. Will have you get an MRI head  4. Can take a whole 0.5 mg of the clonazepam at bedtime to help the active dreams  5.  Continue CPAP use during sleep

## 2022-04-29 ENCOUNTER — TELEPHONE (OUTPATIENT)
Dept: NEUROLOGY | Age: 77
End: 2022-04-29

## 2022-04-29 NOTE — TELEPHONE ENCOUNTER
Received a call from Dr. Pascual Cramer nurse regarding this patient. She stated that Dr. Pascual Cramer is ok with Dr. Ramos Sessions to take over patient medication of Clonazepam. She stated that the patient has had 3 refills and he will needing a refill next month.

## 2022-05-06 ENCOUNTER — HOSPITAL ENCOUNTER (OUTPATIENT)
Dept: MRI IMAGING | Age: 77
Discharge: HOME | End: 2022-05-06
Payer: MEDICARE

## 2022-05-06 DIAGNOSIS — F03.90 DEMENTIA WITHOUT BEHAVIORAL DISTURBANCE, UNSPECIFIED DEMENTIA TYPE: ICD-10-CM

## 2022-05-06 DIAGNOSIS — R93.0 ABNORMAL MRI OF HEAD: ICD-10-CM

## 2022-05-06 PROCEDURE — 70551 MRI BRAIN STEM W/O DYE: CPT

## 2022-05-11 ENCOUNTER — TELEPHONE (OUTPATIENT)
Dept: NEUROLOGY | Age: 77
End: 2022-05-11

## 2022-05-11 NOTE — TELEPHONE ENCOUNTER
----- Message from Can Swain MD sent at 5/9/2022  7:43 PM CDT -----  MRI head was same as before showing some basal ganglia changes bilaterally, possibly iron deposits.   Send last encounter and MRI report to his PCP

## 2022-05-12 NOTE — TELEPHONE ENCOUNTER
Patient wife called back to see about the result per Marli calling on 5-11 gave results to wife and patient per Dr Winifred Valdez. They both understood.

## 2022-05-16 ENCOUNTER — TELEPHONE (OUTPATIENT)
Dept: NEUROLOGY | Age: 77
End: 2022-05-16

## 2022-05-16 NOTE — TELEPHONE ENCOUNTER
Pt wife called, they are on vacation. She said that pt came in stared at ceiling and stated that the fan moved from right to left and then back to where it was. She doesn't know what caused it, Pt didn't seemed concerned, Pt  Wife concerned it might be stroke etc. Please call. Best time to call is anytime. Thank You!

## 2022-05-18 NOTE — TELEPHONE ENCOUNTER
Spoke to patients wife and patient was on bed right after getting to condo on vacation, when he thought the fan was moving. He didn't have any stroke s/s. She reports that he has been tired and does not want to go to the beach. She reports that he had labs for his PCP and some of the levels were off.

## 2022-06-29 ENCOUNTER — TELEPHONE (OUTPATIENT)
Dept: NEUROLOGY | Age: 77
End: 2022-06-29

## 2022-06-29 ENCOUNTER — TELEPHONE (OUTPATIENT)
Dept: CARDIOLOGY CLINIC | Age: 77
End: 2022-06-29

## 2022-06-29 NOTE — TELEPHONE ENCOUNTER
Pt's wife called and states on Saturday pt's legs became weak causing pt to almost collapsed. Wife took him to the ED that day. As of today, Wednesday, pt does not remember going to the ED on Saturday and wife is worried pt may have had a stroke. Please advise.

## 2022-07-25 ENCOUNTER — TELEPHONE (OUTPATIENT)
Dept: NEUROLOGY | Age: 77
End: 2022-07-25

## 2022-07-25 DIAGNOSIS — G47.52 REM SLEEP BEHAVIOR DISORDER: Primary | ICD-10-CM

## 2022-07-25 RX ORDER — CLONAZEPAM 0.5 MG/1
0.5 TABLET ORAL NIGHTLY
Qty: 30 TABLET | Refills: 5 | Status: SHIPPED | OUTPATIENT
Start: 2022-07-28 | End: 2022-08-27

## 2022-07-25 NOTE — TELEPHONE ENCOUNTER
Patient wife needs speak with a nurse she has question about medication. Please called the patient wife [phone].     Thank you

## 2022-09-06 ENCOUNTER — TELEPHONE (OUTPATIENT)
Dept: CARDIOLOGY CLINIC | Age: 77
End: 2022-09-06

## 2022-09-06 DIAGNOSIS — E78.2 MIXED HYPERLIPIDEMIA: Primary | ICD-10-CM

## 2022-09-06 NOTE — TELEPHONE ENCOUNTER
Rosaleematias Luis has an upcoming appt on 10/10/2022. If labs are needed prior to this appointment, please ensure active orders are available as I have made him aware of our walk-in hours for the lab. Pt is coming in town tomorrow 9/7/22 to have labs done. If they are not needed, please contact patient to advise. Thank you.

## 2022-09-06 NOTE — TELEPHONE ENCOUNTER
Advised patient's wife lab orders are in and he will need to fast the night before. She voiced understanding.

## 2022-09-07 DIAGNOSIS — E78.2 MIXED HYPERLIPIDEMIA: ICD-10-CM

## 2022-09-07 LAB
CHOLESTEROL, TOTAL: 165 MG/DL (ref 160–199)
HDLC SERPL-MCNC: 40 MG/DL (ref 55–121)
LDL CHOLESTEROL CALCULATED: 101 MG/DL
TRIGL SERPL-MCNC: 118 MG/DL (ref 0–149)

## 2022-10-10 ENCOUNTER — OFFICE VISIT (OUTPATIENT)
Dept: CARDIOLOGY CLINIC | Age: 77
End: 2022-10-10
Payer: MEDICARE

## 2022-10-10 VITALS
WEIGHT: 195 LBS | HEIGHT: 66 IN | BODY MASS INDEX: 31.34 KG/M2 | SYSTOLIC BLOOD PRESSURE: 96 MMHG | HEART RATE: 63 BPM | DIASTOLIC BLOOD PRESSURE: 58 MMHG

## 2022-10-10 DIAGNOSIS — I65.23 BILATERAL CAROTID ARTERY STENOSIS: ICD-10-CM

## 2022-10-10 DIAGNOSIS — E78.00 HYPERCHOLESTEROLEMIA: ICD-10-CM

## 2022-10-10 DIAGNOSIS — R94.39 ABNORMAL STRESS TEST: ICD-10-CM

## 2022-10-10 DIAGNOSIS — E78.2 MIXED HYPERLIPIDEMIA: Primary | ICD-10-CM

## 2022-10-10 DIAGNOSIS — I77.9 BILATERAL CAROTID ARTERY DISEASE, UNSPECIFIED TYPE (HCC): ICD-10-CM

## 2022-10-10 DIAGNOSIS — R06.02 SOB (SHORTNESS OF BREATH): ICD-10-CM

## 2022-10-10 DIAGNOSIS — I67.9 CEREBROVASCULAR DISEASE: ICD-10-CM

## 2022-10-10 DIAGNOSIS — I10 ESSENTIAL HYPERTENSION: ICD-10-CM

## 2022-10-10 DIAGNOSIS — I73.9 CLAUDICATION (HCC): ICD-10-CM

## 2022-10-10 DIAGNOSIS — Z95.1 HX OF CABG: ICD-10-CM

## 2022-10-10 DIAGNOSIS — I50.22 CHRONIC SYSTOLIC (CONGESTIVE) HEART FAILURE (HCC): ICD-10-CM

## 2022-10-10 DIAGNOSIS — I25.10 CORONARY ARTERY DISEASE INVOLVING NATIVE CORONARY ARTERY OF NATIVE HEART, UNSPECIFIED WHETHER ANGINA PRESENT: ICD-10-CM

## 2022-10-10 PROCEDURE — G8427 DOCREV CUR MEDS BY ELIG CLIN: HCPCS | Performed by: INTERNAL MEDICINE

## 2022-10-10 PROCEDURE — G8417 CALC BMI ABV UP PARAM F/U: HCPCS | Performed by: INTERNAL MEDICINE

## 2022-10-10 PROCEDURE — 1123F ACP DISCUSS/DSCN MKR DOCD: CPT | Performed by: INTERNAL MEDICINE

## 2022-10-10 PROCEDURE — 1036F TOBACCO NON-USER: CPT | Performed by: INTERNAL MEDICINE

## 2022-10-10 PROCEDURE — 99214 OFFICE O/P EST MOD 30 MIN: CPT | Performed by: INTERNAL MEDICINE

## 2022-10-10 PROCEDURE — G8484 FLU IMMUNIZE NO ADMIN: HCPCS | Performed by: INTERNAL MEDICINE

## 2022-10-10 RX ORDER — ATORVASTATIN CALCIUM 20 MG/1
20 TABLET, FILM COATED ORAL DAILY
Qty: 90 TABLET | Refills: 3 | Status: SHIPPED | OUTPATIENT
Start: 2022-10-10 | End: 2022-10-10

## 2022-10-10 RX ORDER — METOPROLOL SUCCINATE 25 MG/1
25 TABLET, EXTENDED RELEASE ORAL DAILY
Qty: 90 TABLET | Refills: 3 | Status: SHIPPED | OUTPATIENT
Start: 2022-10-10

## 2022-10-10 RX ORDER — ATORVASTATIN CALCIUM 40 MG/1
40 TABLET, FILM COATED ORAL DAILY
Qty: 90 TABLET | Refills: 3 | Status: SHIPPED | OUTPATIENT
Start: 2022-10-10

## 2022-10-10 ASSESSMENT — ENCOUNTER SYMPTOMS
DIARRHEA: 0
RESPIRATORY NEGATIVE: 1
NAUSEA: 0
SHORTNESS OF BREATH: 0
GASTROINTESTINAL NEGATIVE: 1
EYES NEGATIVE: 1
VOMITING: 0

## 2022-10-10 NOTE — PATIENT INSTRUCTIONS
[place] at the [facility] and [address] located on the first floor of [place] [address] through hospital main entrance and turn immediately to your left. Patient's contact number:  [phone] (home) [phone] (work)    Date/Time:   Wednesday 12:30    Carotid Ultrasound   -  No prep. Takes approximately 30 minutes. Carotid ultrasound is a safe, painless procedure that uses sound waves to examine the structure and function of the carotid arteries in the neck. You have two carotid arteries, one on each side of the neck, which deliver blood from the heart to the brain. Carotid ultrasound can reveal whether an artery has any blockage and how well blood flows through the artery. Carotid ultrasound is usually used to screen for blockages that indicate an increased risk of stroke. Results from a carotid ultrasound can help your doctor determine what kind of treatment you may need to lower your risk. If you need to change your appointment, please call outpatient scheduling at [phone].

## 2022-10-10 NOTE — PROGRESS NOTES
[facility]                            Date:  10/10/2022  Patient: Sandy Henson  Age:  65 y.o., 1945      Reason for evaluation:         SUBJECTIVE:    Returns today follow-up assessment. Followed for coronary artery disease hypertension hyperlipidemia previous CABG shortness of breath overall feeling well. Denies anginal chest pain denies dyspnea. Does not smoke. Had a carotid ultrasound as part of life screening last year this time showing mild to moderate disease looking back to our records I do not see that he is ever had 1 here at the hospital we will go ahead and order one. On 9/7/2022 LDL cholesterol 101 goal would be for him LDL less than 70 presently on atorvastatin 20 mg daily would recommend increasing this to 40 mg daily recommend follow-up assessment in 6 months blood pressure 96/58 heart 63. Review of Systems   Constitutional: Negative. Negative for chills, fever and unexpected weight change. HENT: Negative. Eyes: Negative. Respiratory: Negative. Negative for shortness of breath. Cardiovascular: Negative. Negative for chest pain. Gastrointestinal: Negative. Negative for diarrhea, nausea and vomiting. Endocrine: Negative. Genitourinary: Negative. Musculoskeletal: Negative. Skin: Negative. Neurological: Negative. All other systems reviewed and are negative. OBJECTIVE:     BP (!) 96/58   Pulse 63   Ht 5' 6\" (1.676 m)   Wt 195 lb (88.5 kg)   BMI 31.47 kg/m²     Labs:   CBC: No results for input(s): WBC, HGB, HCT, PLT in the last 72 hours. BMP:No results for input(s): NA, K, CO2, BUN, CREATININE, LABGLOM, GLUCOSE in the last 72 hours. BNP: No results for input(s): BNP in the last 72 hours. PT/INR: No results for input(s): PROTIME, INR in the last 72 hours. APTT:No results for input(s): APTT in the last 72 hours.   CARDIAC ENZYMES:No results for input(s): CKTOTAL, CKMB, CKMBINDEX, TROPONINI in the last 72 hours.  FASTING LIPID PANEL:  Lab Results   Component Value Date/Time    HDL 40 09/07/2022 08:38 AM    LDLCALC 101 09/07/2022 08:38 AM    TRIG 118 09/07/2022 08:38 AM     LIVER PROFILE:No results for input(s): AST, ALT, LABALBU in the last 72 hours.         Past Medical History:   Diagnosis Date    Acute sinusitis     Arthritis     Atherosclerosis of coronary artery bypass graft(s) without angina pectoris     Bone pain     BPH (benign prostatic hyperplasia)     Cardiovascular disease     Chest pain     pressure    Chronic systolic (congestive) heart failure 10/10/2022    CPAP (continuous positive airway pressure) dependence 04/2018    8cm    Diarrhea     Dyspnea on exertion     ED (erectile dysfunction)     Glaucoma     pre glaucoma    Hyperlipidemia     Hypertension     Hypoglycemia, unspecified     Kidney stones     Left shoulder pain     Low back pain     Memory disorder     Nocturia     Obstructive sleep apnea     AHI:  47 per [facility] in [place], 3/2018    Other fatigue     Polycythemia     REM sleep behavior disorder     Sleep terrors     Unstable angina (HCC)      Past Surgical History:   Procedure Laterality Date    APPENDECTOMY      CORONARY ARTERY BYPASS GRAFT      CORONARY ARTERY BYPASS GRAFT      aortofemoral    DIAGNOSTIC CARDIAC CATH LAB PROCEDURE      HERNIA REPAIR      HIP ARTHROPLASTY Right     LITHOTRIPSY       Family History   Problem Relation Age of Onset    No Known Problems Mother     Heart Disease Father      No Known Allergies  Current Outpatient Medications   Medication Sig Dispense Refill    metoprolol succinate (TOPROL XL) 25 MG extended release tablet Take 1 tablet by mouth daily 90 tablet 3    atorvastatin (LIPITOR) 40 MG tablet Take 1 tablet by mouth daily 90 tablet 3    memantine (NAMENDA) 10 MG tablet Take 1 tablet by mouth daily 60 tablet 5    timolol (TIMOPTIC) 0.5 % ophthalmic solution 1 drop 2 times daily      potassium citrate (UROCIT-K) 10 MEQ (1080 MG) extended release tablet Take by mouth 3 times daily (with meals)      hydroxyurea (HYDREA) 500 MG chemo capsule Take 500 mg by mouth 2 times daily       tamsulosin (FLOMAX) 0.4 MG capsule Take 0.4 mg by mouth daily      zinc gluconate 50 MG tablet Take 50 mg by mouth daily      Coenzyme Q10 (Q-10 CO-ENZYME PO) Take 200 mg by mouth daily      Melatonin 10 MG TABS Take by mouth nightly       nitroGLYCERIN (NITROSTAT) 0.4 MG SL tablet Place 1 tablet under the tongue as needed for Chest pain 25 tablet 3    aspirin EC 81 MG EC tablet Take 81 mg by mouth nightly      latanoprost (XALATAN) 0.005 % ophthalmic solution Place 1 drop into both eyes nightly   6    clonazePAM (KLONOPIN) 0.5 MG tablet Take 1 tablet by mouth nightly for 30 days. Indications: patient takes 1/2 tablet 30 tablet 5     No current facility-administered medications for this visit. 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      Social Determinants of Health     Financial Resource Strain: Not on file   Food Insecurity: Not on file   Transportation Needs: Not on file   Physical Activity: Not on file   Stress: Not on file   Social Connections: Not on file   Intimate Partner Violence: Not on file   Housing Stability: Not on file       Physical Examination:  BP (!) 96/58   Pulse 63   Ht 5' 6\" (1.676 m)   Wt 195 lb (88.5 kg)   BMI 31.47 kg/m²   Physical Exam  Vitals reviewed. Constitutional:       Appearance: He is well-developed. Neck:      Vascular: No carotid bruit or JVD. Cardiovascular:      Rate and Rhythm: Normal rate and regular rhythm. Heart sounds: Normal heart sounds. No murmur heard. No friction rub. No gallop. Pulmonary:      Effort: Pulmonary effort is normal. No respiratory distress. Breath sounds: Normal breath sounds. No wheezing or rales. Abdominal:      General: There is no distension. Tenderness: There is no abdominal tenderness. Lymphadenopathy:      Cervical: No cervical adenopathy. Skin:     General: Skin is warm and dry. ASSESSMENT:     Diagnosis Orders   1. Mixed hyperlipidemia  Lipid Panel      2. Coronary artery disease involving native coronary artery of native heart  metoprolol succinate (TOPROL XL) 25 MG extended release tablet    atorvastatin (LIPITOR) 40 MG tablet    Lipid Panel    DISCONTINUED: atorvastatin (LIPITOR) 20 MG tablet      3. Claudication ([facility]  Lipid Panel      4. Chronic systolic (congestive) heart failure  Lipid Panel      5. Abnormal stress test  Lipid Panel      6. Hypercholesterolemia  Lipid Panel      7. Essential hypertension  Lipid Panel      8. Hx of CABG  Lipid Panel      9. SOB (shortness of breath)  Lipid Panel      10. Bilateral carotid artery disease, unspecified type ([facility] [address]        11. Cerebrovascular disease  VL DUP CAROTID BILATERAL      12.  Bilateral carotid artery stenosis  VL DUP CAROTID BILATERAL          PLAN:  Orders Placed This Encounter   Procedures    VL DUP CAROTID BILATERAL    Lipid Panel       Orders Placed This Encounter   Medications    metoprolol succinate (TOPROL XL) 25 MG extended release tablet     Sig: Take 1 tablet by mouth daily     Dispense:  90 tablet     Refill:  3 DISCONTD: atorvastatin (LIPITOR) 20 MG tablet     Sig: Take 1 tablet by mouth daily     Dispense:  90 tablet     Refill:  3    atorvastatin (LIPITOR) 40 MG tablet     Sig: Take 1 tablet by mouth daily     Dispense:  90 tablet     Refill:  3         Continue present medications  Recommend increasing atorvastatin 40 mg p.o. nightly  Repeat lipid profile 3 months  Recommend follow-up carotid ultrasound  Recommend follow-up assessment in 6 months      Return in about 6 months (around 4/10/2023) for return to Dr. Justin Manzo only. Marcie Tabares MD 10/10/2022 1:19 PM CDT    [facility]      Thisdictation was generated by voice recognition computer software. Although all attempts are made to edit the dictation for accuracy, there may be errors in the transcription that are not intended.

## 2022-10-12 ENCOUNTER — HOSPITAL ENCOUNTER (OUTPATIENT)
Dept: VASCULAR LAB | Age: 77
Discharge: HOME | End: 2022-10-12
Payer: MEDICARE

## 2022-10-12 DIAGNOSIS — I65.23 BILATERAL CAROTID ARTERY STENOSIS: ICD-10-CM

## 2022-10-12 DIAGNOSIS — I67.9 CEREBROVASCULAR DISEASE: ICD-10-CM

## 2022-10-12 PROCEDURE — 93880 EXTRACRANIAL BILAT STUDY: CPT

## 2022-11-07 ENCOUNTER — OFFICE VISIT (OUTPATIENT)
Dept: NEUROLOGY | Age: 77
End: 2022-11-07
Payer: MEDICARE

## 2022-11-07 VITALS
HEART RATE: 64 BPM | HEIGHT: 67 IN | DIASTOLIC BLOOD PRESSURE: 60 MMHG | WEIGHT: 193 LBS | BODY MASS INDEX: 30.29 KG/M2 | SYSTOLIC BLOOD PRESSURE: 102 MMHG | RESPIRATION RATE: 18 BRPM

## 2022-11-07 DIAGNOSIS — F02.B0: Primary | ICD-10-CM

## 2022-11-07 DIAGNOSIS — G47.52 REM SLEEP BEHAVIOR DISORDER: ICD-10-CM

## 2022-11-07 DIAGNOSIS — G47.33 OBSTRUCTIVE SLEEP APNEA: ICD-10-CM

## 2022-11-07 DIAGNOSIS — R25.1 TREMOR: ICD-10-CM

## 2022-11-07 DIAGNOSIS — R40.4 TRANSIENT ALTERATION OF AWARENESS: ICD-10-CM

## 2022-11-07 DIAGNOSIS — G31.01: Primary | ICD-10-CM

## 2022-11-07 PROCEDURE — G8417 CALC BMI ABV UP PARAM F/U: HCPCS | Performed by: PSYCHIATRY & NEUROLOGY

## 2022-11-07 PROCEDURE — 1036F TOBACCO NON-USER: CPT | Performed by: PSYCHIATRY & NEUROLOGY

## 2022-11-07 PROCEDURE — 99213 OFFICE O/P EST LOW 20 MIN: CPT | Performed by: PSYCHIATRY & NEUROLOGY

## 2022-11-07 PROCEDURE — G8427 DOCREV CUR MEDS BY ELIG CLIN: HCPCS | Performed by: PSYCHIATRY & NEUROLOGY

## 2022-11-07 PROCEDURE — G8484 FLU IMMUNIZE NO ADMIN: HCPCS | Performed by: PSYCHIATRY & NEUROLOGY

## 2022-11-07 PROCEDURE — 1123F ACP DISCUSS/DSCN MKR DOCD: CPT | Performed by: PSYCHIATRY & NEUROLOGY

## 2022-11-07 RX ORDER — CLONAZEPAM 0.5 MG/1
1 TABLET ORAL NIGHTLY
Qty: 30 TABLET | Refills: 5 | Status: SHIPPED | OUTPATIENT
Start: 2022-11-07 | End: 2022-12-07

## 2022-11-07 NOTE — PROGRESS NOTES
[facility]  [address] [place] [address] [address], [address]  [place], [place]  Phone [phone]  Fax [phone]     [facility] Follow Up Encounter  22 9:55 AM CST    Information:   Patient Name: Jatin Cardoza  :   1945  Age:   65 y.o. MRN:   [id number]  Account #:  [de-identified]  Today:  22    Provider: Wm Reeder M.D. Chief Complaint:   Chief Complaint   Patient presents with    Follow-up    Sleep Apnea    Memory Loss       Subjective:   Jatin Cardoza is a 65 y.o. man with a history of dementia, RSBD, and ANGE who is following up. His memory is worsening. He remains independent with personal care. He is taking Namenda 10 mg BID. Last visit, he was started on Aricept. Shortly afterward, he was on vacation in a condo lying on the bed and staring at the ceiling when it seemed to him that the fan moved. He has not had any other illusion, hallucination, or delusion. A few months ago, he had a spell in which he was generally weak. He was taken to a local ER where his symptoms resolved and evaluation was unremarkable. His wife thought the spells were due to the Aricept and she had him stop taking it. She was worried that he was having TIAs. He had an MRI that showed little to no small vessel disease and carotid ultrasound that showed no significant stenosis. He takes aspirin and Lipitor. He has had few episodes of RSBD. He is using his CPAP nightly.         Objective:     Past Medical History:  Past Medical History:   Diagnosis Date    Acute sinusitis     Arthritis     Atherosclerosis of coronary artery bypass graft(s) without angina pectoris     Bone pain     BPH (benign prostatic hyperplasia)     Cardiovascular disease     Chest pain     pressure    Chronic systolic (congestive) heart failure 10/10/2022    CPAP (continuous positive airway pressure) dependence 2018    8cm    Diarrhea     Dyspnea on exertion     ED (erectile dysfunction)     Glaucoma     pre glaucoma Hyperlipidemia     Hypertension     Hypoglycemia, unspecified     Kidney stones     Left shoulder pain     Low back pain     Memory disorder     Nocturia     Obstructive sleep apnea     AHI:  47 per [facility] in [place], 3/2018    Other fatigue     Polycythemia     REM sleep behavior disorder     Sleep terrors     Unstable angina ([facility] [address]        Past Surgical History:   Procedure Laterality Date    APPENDECTOMY      CORONARY ARTERY BYPASS GRAFT      CORONARY ARTERY BYPASS GRAFT      aortofemoral    DIAGNOSTIC CARDIAC CATH LAB PROCEDURE      HERNIA REPAIR      HIP ARTHROPLASTY Right     LITHOTRIPSY         Recent Hospitalizations  None    Significant Injuries  None    Habits  Kiera Huerta reports that he has never smoked. He has never used smokeless tobacco. He reports that he does not drink alcohol and does not use drugs. Family History   Problem Relation Age of Onset    No Known Problems Mother     Heart Disease Father        Social History  Kiera Huerta is , lives in [place], [place], and is retired. Medications:  Current Outpatient Medications   Medication Sig Dispense Refill    metoprolol succinate (TOPROL XL) 25 MG extended release tablet Take 1 tablet by mouth daily 90 tablet 3    atorvastatin (LIPITOR) 40 MG tablet Take 1 tablet by mouth daily 90 tablet 3    clonazePAM (KLONOPIN) 0.5 MG tablet Take 1 tablet by mouth nightly for 30 days.  Indications: patient takes 1/2 tablet 30 tablet 5    memantine (NAMENDA) 10 MG tablet Take 1 tablet by mouth daily 60 tablet 5    timolol (TIMOPTIC) 0.5 % ophthalmic solution 1 drop 2 times daily      potassium citrate (UROCIT-K) 10 MEQ (1080 MG) extended release tablet Take by mouth 3 times daily (with meals)      hydroxyurea (HYDREA) 500 MG chemo capsule Take 500 mg by mouth 2 times daily       tamsulosin (FLOMAX) 0.4 MG capsule Take 0.4 mg by mouth daily      zinc gluconate 50 MG tablet Take 50 mg by mouth daily      Coenzyme Q10 (Q-10 CO-ENZYME PO) Take 200 mg by mouth daily      Melatonin 10 MG TABS Take by mouth nightly       nitroGLYCERIN (NITROSTAT) 0.4 MG SL tablet Place 1 tablet under the tongue as needed for Chest pain 25 tablet 3    aspirin EC 81 MG EC tablet Take 81 mg by mouth nightly      latanoprost (XALATAN) 0.005 % ophthalmic solution Place 1 drop into both eyes nightly   6     No current facility-administered medications for this visit. Allergies:   Allergies as of 11/07/2022    (No Known Allergies)       Review of Systems:  Constitutional: negative for - chills and fever  Eyes:  negative for - visual disturbance and photophobia  HENMT: negative for - headaches and sinus pain  Respiratory: negative for - cough, hemoptysis, and shortness of breath  Cardiovascular: negative for - chest pain and palpitations  Gastrointestinal: negative for - blood in stools, constipation, diarrhea, nausea, and vomiting  Genito-Urinary: negative for - hematuria, urinary frequency, urinary urgency, and urinary retention  Musculoskeletal: negative for - joint pain, joint stiffness, and joint swelling  Hematological and Lymphatic: negative for - bleeding problems, abnormal bruising, and swollen lymph nodes  Endocrine:  negative for - polydipsia and polyphagia  Allergy/Immunology:  negative for - rhinorrhea, sinus congestion, hives  Integument:  negative for - negative for - rash, change in moles, new or changing lesions  Psychological: negative for - anxiety and depression  Neurological: positive for - memory loss  Negative for - numbness/tingling, and weakness     PHYSICAL EXAMINATION:  Vitals:  /60   Pulse 64   Resp 18   Ht 5' 7\" (1.702 m)   Wt 193 lb (87.5 kg)   BMI 30.23 kg/m²   General appearance:  Alert, well developed, well nourished, in no distress  HEENT:  normocephalic, atraumatic, sclera appear normal, no nasal abnormalities, no rhinorrhea, Ears appear normal, oral mucous membranes are moist without erythema, trachea midline, thyroid is normal, no lymphadenopathy or neck mass. Cardiovascular:  Regular rate and rhythm without murmer. No peripheral edema, No cyanosis or clubbing. No carotid bruits. Pulmonary:  Lungs are clear to auscultation. Breathing appears normal, good expansion, normal effort without use of accessory muscles  Musculoskeletal:  Joints are osteoarthritic  Integument:  No rash, erythema, or pallor  Psychiatric:  Mood, affect, and behavior appear normal      NEUROLOGIC EXAMINATION:  Mental Status:  alert, oriented to person, place, and time. Speech:  Clear without dysarthria or dysphonia  Language:  Fluent without aphasia  Cranial Nerves:   II Visual fields are full to confrontation   III,IV, VI Extraocular movements are full   VII Facial movements are symmetrical without weakness   VIII Hearing is intact   IX,X Shoulder shrug and head rotation strength are intact   XII No tongue atrophy or fasciculations. Normal tongue protrusion. No tongue weakness  Motor:  Normal strength in both upper and lower extremities. Normal muscle tone and bulk. Deep tendon reflexes are intact and symmetrical in both upper and lower extremities. Bentley's signs are absent bilaterally. There is no ankle clonus on either side. Sensation:  Sensation is intact to light touch, temperature, and vibration in all extremities. Coordination:  Rapid alternating movements are normal in both upper and lower extremities. Finger to nose testing is unimpaired bilaterally. Gait:  Normal station and gait. Pertinent Diagnostic Studies:  Narrative   MRI BRAIN WO CONTRAST 5/6/2022 11:22 AM   HISTORY: F03.90   Comparison: MRI dated 9/7/2018     Technique: Multiplanar imaging of the brain was performed in a routine   fashion without intravenous contrast.   Findings: There is no diffusion signal abnormality to suggest acute infarct. Mild global cortical atrophy. There is no intra-axial or extra-axial   hemorrhage. No pathologic Blooming on the T2 star weighted images.    Global cortical atrophy. Mild symmetric atrophy in the hippocampal   formations. Reidentified symmetric, bilateral globus pallidus T2   hyperintensities (series 5-image 15). No visualized mass lesion on   this noncontrast exam. Normal size and configuration of the   ventricular system for patient age. The posterior fossa structures are   unremarkable. The pituitary gland and sella are unremarkable. The   major intracranial flow voids are preserved. The orbits are   unremarkable. The paranasal sinuses and mastoids are clear. Marrow   signal in the calvarium and skull base appears normal.       Impression   Impression:    1. No acute cranial process. 2. No intracranial mass lesion or hydrocephalus. 3. Reidentified symmetric rounded globus pallidus T2 hyperintensities,   appearance raising suspicion for earlier carbon monoxide toxicity. Signed by Dr Siena Stewart     Narrative   Vascular Carotid Procedure        Demographics        Patient Name     Elise Mcrae Age                   65        Patient Number   [id number]          Gender                Male        Visit Number     [id number]       Gladis Lopez MD                                     Physician        Date of Birth    1945      Referring Physician   Tete Gold MD        Accession Number [id number]      [address] [place] [address]       Procedure   Type of Study:        Cerebral:Carotid, VL CAROTID BILATERAL. Indications for Study:CVD. Risk Factors         - The patient's risk factor(s) include: prior MI and prior CABG. - The patient's risk factor(s) include: Prior CVA, ,       Allergies     - No known allergies. Impression        There is mixed plaque visualized in the bilateral internal carotid    arteries. There is less than 50% stenosis in the right internal carotid artery. There is less than 50% stenosis of the left internal carotid artery.     There is normal antegrade flow in the bilateral vertebral arteries. Signature        ----------------------------------------------------------------    Electronically signed by Karson Burgess MD(Interpreting    physician) on 10/12/2022 04:41 PM       Assessment:       ICD-10-CM    1. 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 B0       2. Transient alteration of awareness  R40.4       3. Obstructive sleep apnea  G47.33       4. REM sleep behavior disorder  G47.52       5. Tremor  R25.1       I discussed the above with him and his wife. He has had no stroke symptoms. His spell of weakness was likely from a low BP. Plan:   1. Continue Nameda 10 mg BID and clonazepam 0.5 mg q HS  2.  Follow up in 6 months    Electronically signed by Jorge Luis Esparza MD on 11/7/22

## 2023-02-15 DIAGNOSIS — G47.52 REM SLEEP BEHAVIOR DISORDER: ICD-10-CM

## 2023-02-15 RX ORDER — CLONAZEPAM 0.5 MG/1
1 TABLET ORAL NIGHTLY
Qty: 30 TABLET | Refills: 5 | Status: SHIPPED | OUTPATIENT
Start: 2023-02-15 | End: 2023-03-17

## 2023-02-15 NOTE — TELEPHONE ENCOUNTER
Requested Prescriptions     Pending Prescriptions Disp Refills    clonazePAM (KLONOPIN) 0.5 MG tablet [Pharmacy Med Name: CLONAZEPAM 0.5 MG TABLET] 30 tablet 5     Sig: Take 2 tablets by mouth at bedtime for 30 days. Max Daily Amount: 1 mg       Last Office Visit:  11/7/2022  Next Office Visit:  5/8/2023  Last Medication Refill:  7/28/22 with 5 RF  Gianna Powers up to date:  2/15/23     *RX updated to reflect   2/15/23  fill date*      Spoke with Rocio at [facility] and she states they did not receive the script for Clonazepam that we sent on 11/7/22.

## 2023-04-04 ENCOUNTER — TELEPHONE (OUTPATIENT)
Dept: CARDIOLOGY CLINIC | Age: 78
End: 2023-04-04

## 2023-04-04 DIAGNOSIS — Z95.1 HX OF CABG: ICD-10-CM

## 2023-04-04 DIAGNOSIS — I10 ESSENTIAL HYPERTENSION: ICD-10-CM

## 2023-04-04 DIAGNOSIS — I73.9 CLAUDICATION (HCC): ICD-10-CM

## 2023-04-04 DIAGNOSIS — E78.00 HYPERCHOLESTEROLEMIA: ICD-10-CM

## 2023-04-04 DIAGNOSIS — R94.39 ABNORMAL STRESS TEST: ICD-10-CM

## 2023-04-04 DIAGNOSIS — E78.2 MIXED HYPERLIPIDEMIA: ICD-10-CM

## 2023-04-04 DIAGNOSIS — I25.10 CORONARY ARTERY DISEASE INVOLVING NATIVE CORONARY ARTERY OF NATIVE HEART, UNSPECIFIED WHETHER ANGINA PRESENT: ICD-10-CM

## 2023-04-04 DIAGNOSIS — I50.22 CHRONIC SYSTOLIC (CONGESTIVE) HEART FAILURE (HCC): ICD-10-CM

## 2023-04-04 DIAGNOSIS — R06.02 SOB (SHORTNESS OF BREATH): ICD-10-CM

## 2023-04-04 LAB
CHOLEST SERPL-MCNC: 134 MG/DL (ref 160–199)
HDLC SERPL-MCNC: 44 MG/DL (ref 55–121)
LDLC SERPL CALC-MCNC: 66 MG/DL
TRIGL SERPL-MCNC: 122 MG/DL (ref 0–149)

## 2023-04-04 NOTE — TELEPHONE ENCOUNTER
Left vm to move patient's appointment on 4/13 at 9:30 with Dr. Fay Godfrey due to hospital call. If patient calls back, okay to see NP or next available for Ananda.

## 2023-04-18 ENCOUNTER — TELEPHONE (OUTPATIENT)
Dept: NEUROLOGY | Age: 78
End: 2023-04-18

## 2023-05-25 ENCOUNTER — OFFICE VISIT (OUTPATIENT)
Dept: CARDIOLOGY CLINIC | Age: 78
End: 2023-05-25

## 2023-05-25 VITALS
DIASTOLIC BLOOD PRESSURE: 66 MMHG | HEART RATE: 60 BPM | HEIGHT: 67 IN | SYSTOLIC BLOOD PRESSURE: 100 MMHG | BODY MASS INDEX: 31.55 KG/M2 | WEIGHT: 201 LBS

## 2023-05-25 DIAGNOSIS — I73.9 CLAUDICATION (HCC): ICD-10-CM

## 2023-05-25 DIAGNOSIS — E78.2 MIXED HYPERLIPIDEMIA: ICD-10-CM

## 2023-05-25 DIAGNOSIS — Z95.1 HX OF CABG: ICD-10-CM

## 2023-05-25 DIAGNOSIS — R00.2 PALPITATION: Primary | ICD-10-CM

## 2023-05-25 DIAGNOSIS — R06.02 SOB (SHORTNESS OF BREATH): ICD-10-CM

## 2023-05-25 DIAGNOSIS — I25.10 CORONARY ARTERY DISEASE INVOLVING NATIVE CORONARY ARTERY OF NATIVE HEART, UNSPECIFIED WHETHER ANGINA PRESENT: ICD-10-CM

## 2023-05-25 DIAGNOSIS — I50.22 CHRONIC SYSTOLIC (CONGESTIVE) HEART FAILURE (HCC): ICD-10-CM

## 2023-05-25 DIAGNOSIS — I47.29 VENTRICULAR TACHYCARDIA (PAROXYSMAL) (HCC): ICD-10-CM

## 2023-05-25 DIAGNOSIS — R94.39 ABNORMAL STRESS TEST: ICD-10-CM

## 2023-05-25 DIAGNOSIS — I10 ESSENTIAL HYPERTENSION: ICD-10-CM

## 2023-05-25 RX ORDER — AMIODARONE HYDROCHLORIDE 200 MG/1
200 TABLET ORAL 2 TIMES DAILY
COMMUNITY

## 2023-05-25 ASSESSMENT — ENCOUNTER SYMPTOMS
NAUSEA: 0
DIARRHEA: 0
EYES NEGATIVE: 1
SHORTNESS OF BREATH: 0
GASTROINTESTINAL NEGATIVE: 1
RESPIRATORY NEGATIVE: 1
VOMITING: 0

## 2023-05-25 NOTE — PATIENT INSTRUCTIONS
hours prior to the test.  Nitroglycerin patches must be taken off 1 hour before testing. Wear comfortable clothing. Please refrain from any strenuous exercise or activities the day before your test, or the day of your test.  The Nuclear Lexiscan Stress test takes about 2 ½ to 3 hours to complete. If for any reason you are unable to keep this appointment, please contact Outpatient Scheduling, [phone], as soon as possible to reschedule.

## 2023-05-25 NOTE — PROGRESS NOTES
Known Problems Mother     Heart Disease Father      No Known Allergies  Current Outpatient Medications   Medication Sig Dispense Refill    amiodarone (CORDARONE) 200 MG tablet Take 1 tablet by mouth in the morning and at bedtime      clonazePAM (KLONOPIN) 0.5 MG tablet Take 2 tablets by mouth at bedtime for 30 days. Max Daily Amount: 1 mg 30 tablet 5    metoprolol succinate (TOPROL XL) 25 MG extended release tablet Take 1 tablet by mouth daily 90 tablet 3    atorvastatin (LIPITOR) 40 MG tablet Take 1 tablet by mouth daily 90 tablet 3    memantine (NAMENDA) 10 MG tablet Take 1 tablet by mouth daily 60 tablet 5    timolol (TIMOPTIC) 0.5 % ophthalmic solution 1 drop 2 times daily      potassium citrate (UROCIT-K) 10 MEQ (1080 MG) extended release tablet Take by mouth daily      tamsulosin (FLOMAX) 0.4 MG capsule Take 1 capsule by mouth daily      zinc gluconate 50 MG tablet Take 1 tablet by mouth daily      Coenzyme Q10 (Q-10 CO-ENZYME PO) Take 200 mg by mouth daily      Melatonin 10 MG TABS Take by mouth nightly       nitroGLYCERIN (NITROSTAT) 0.4 MG SL tablet Place 1 tablet under the tongue as needed for Chest pain 25 tablet 3    aspirin EC 81 MG EC tablet Take 1 tablet by mouth nightly      latanoprost (XALATAN) 0.005 % ophthalmic solution Place 1 drop into both eyes nightly  6     No current facility-administered medications for this visit.      Social History     Socioeconomic History    Marital status:      Spouse name: Not on file    Number of children: Not on file    Years of education: Not on file    Highest education level: Not on file   Occupational History    Occupation: retired   Tobacco Use    Smoking status: Never    Smokeless tobacco: Never   Vaping Use    Vaping Use: Never used   Substance and Sexual Activity    Alcohol use: No    Drug use: Never    Sexual activity: Not on file   Other Topics Concern    Not on file   Social History Narrative     8 years second marriage    He has 2

## 2023-06-02 ENCOUNTER — TELEPHONE (OUTPATIENT)
Dept: CARDIOLOGY CLINIC | Age: 78
End: 2023-06-02

## 2023-07-18 ENCOUNTER — OFFICE VISIT (OUTPATIENT)
Dept: CARDIOLOGY CLINIC | Age: 78
End: 2023-07-18
Payer: MEDICARE

## 2023-07-18 VITALS
HEART RATE: 66 BPM | HEIGHT: 67 IN | SYSTOLIC BLOOD PRESSURE: 100 MMHG | DIASTOLIC BLOOD PRESSURE: 62 MMHG | OXYGEN SATURATION: 96 % | WEIGHT: 198 LBS | BODY MASS INDEX: 31.08 KG/M2

## 2023-07-18 DIAGNOSIS — I47.29 VENTRICULAR TACHYCARDIA (PAROXYSMAL) (HCC): ICD-10-CM

## 2023-07-18 DIAGNOSIS — I25.10 CORONARY ARTERY DISEASE INVOLVING NATIVE CORONARY ARTERY OF NATIVE HEART WITHOUT ANGINA PECTORIS: Primary | ICD-10-CM

## 2023-07-18 DIAGNOSIS — I10 ESSENTIAL HYPERTENSION: ICD-10-CM

## 2023-07-18 PROCEDURE — G8417 CALC BMI ABV UP PARAM F/U: HCPCS | Performed by: CLINICAL NURSE SPECIALIST

## 2023-07-18 PROCEDURE — 3074F SYST BP LT 130 MM HG: CPT | Performed by: CLINICAL NURSE SPECIALIST

## 2023-07-18 PROCEDURE — 99214 OFFICE O/P EST MOD 30 MIN: CPT | Performed by: CLINICAL NURSE SPECIALIST

## 2023-07-18 PROCEDURE — G8427 DOCREV CUR MEDS BY ELIG CLIN: HCPCS | Performed by: CLINICAL NURSE SPECIALIST

## 2023-07-18 PROCEDURE — 1123F ACP DISCUSS/DSCN MKR DOCD: CPT | Performed by: CLINICAL NURSE SPECIALIST

## 2023-07-18 PROCEDURE — 1036F TOBACCO NON-USER: CPT | Performed by: CLINICAL NURSE SPECIALIST

## 2023-07-18 PROCEDURE — 3078F DIAST BP <80 MM HG: CPT | Performed by: CLINICAL NURSE SPECIALIST

## 2023-07-18 RX ORDER — AMIODARONE HYDROCHLORIDE 200 MG/1
200 TABLET ORAL DAILY
Qty: 30 TABLET | Refills: 5 | Status: SHIPPED
Start: 2023-07-18

## 2023-07-18 NOTE — PROGRESS NOTES
[facility]  [address] [facility], [facility] [place] [address]  Phone: [phone]  Fax: [phone]    OFFICE VISIT:  2023    Lorrie Zavala - : 1945    Reason For Visit:  Patricio Javed is a 77 y.o. male who is here for Follow-up (Pt has side pain since starting the new medication and fatigue but had kidney stones 2 weeks ago ), Hypotension, Hyperlipidemia, and Coronary Artery Disease  History of coronary disease with previous bypass, Parkinson's disease, ANGE, hypertension hyperlipidemia. Was hospitalized earlier this year with ventricular tachycardia at [facility].  He was treated with amiodarone. At that time echo showed preserved LV systolic function with mild diastolic dysfunction and mild MR. No other significant findings  Monitor worn for a week postdischarge showed normal sinus rhythm. 15 episodes of atrial tachycardia lasting 18 beats is the longest.  7 episodes of ventricular tachycardia with longest lasting 23 beats with an average heart rate 1 13-1 40. Fastest at 156. Low ectopy burden. Was seen with Dr. Gladis Daily in follow-up on 2023. Recommended nuclear stress test to assess for any ischemia causing arrhythmias. Ongoing amiodarone for 200 mg daily after 2 months of twice daily therapy    He is here today in follow-up company with his wife. He states he has had kidney stones with removal at [facility]. Not had stress test yet. They respect to schedule MRI but he has not heard from it. He has been more fatigued and tired. Has occasional fleeting sharp chest pains. Wife states in  he had an episode of chest pain that lasted several minutes. He did not take any nitroglycerin. Subjective  Patricio Javed denies exertional chest pain, shortness of breath, orthopnea, paroxysmal nocturnal dyspnea, syncope, presyncope, arrhythmia, edema .   The patient denies numbness or weakness to suggest cerebrovascular accident or transient ischemic

## 2023-08-01 DIAGNOSIS — I25.10 CORONARY ARTERY DISEASE INVOLVING NATIVE CORONARY ARTERY OF NATIVE HEART, UNSPECIFIED WHETHER ANGINA PRESENT: ICD-10-CM

## 2023-08-01 DIAGNOSIS — I47.29 VENTRICULAR TACHYCARDIA (PAROXYSMAL) (HCC): ICD-10-CM

## 2023-08-01 DIAGNOSIS — R94.39 ABNORMAL STRESS TEST: ICD-10-CM

## 2023-08-01 DIAGNOSIS — Z95.1 HX OF CABG: ICD-10-CM

## 2023-08-01 DIAGNOSIS — R06.02 SOB (SHORTNESS OF BREATH): ICD-10-CM

## 2023-08-07 ENCOUNTER — TELEPHONE (OUTPATIENT)
Dept: CARDIOLOGY CLINIC | Age: 78
End: 2023-08-07

## 2023-08-07 NOTE — TELEPHONE ENCOUNTER
Rosemary Blanchard,  I reviewed the [address] [place], [place] [address]vd S under media. It showed the old apical heart attack with no new reduced blood flow to the heart. I will have Dr. Titi Osorio review.   Thanks, [employer]

## 2023-08-17 DIAGNOSIS — G47.52 REM SLEEP BEHAVIOR DISORDER: ICD-10-CM

## 2023-08-17 NOTE — TELEPHONE ENCOUNTER
Requested Prescriptions     Pending Prescriptions Disp Refills    clonazePAM (KLONOPIN) 0.5 MG tablet 60 tablet 0     Sig: Take 2 tablets by mouth at bedtime for 30 days.  Max Daily Amount: 1 mg    memantine (NAMENDA) 10 MG tablet 60 tablet 5     Sig: Take 1 tablet by mouth daily       Last Office Visit:  11/7/2022  Next Office Visit:  9/25/2023  Last Medication Refill:  4/28/2022 with 5 RF on the memantine   Clonazepam 2/15/2023 with 5 RF   Shellia Spore up to date:  8/17/2023    *RX updated to reflect   8/17/2023  fill date*      Patient needs appointment

## 2023-08-18 RX ORDER — CLONAZEPAM 0.5 MG/1
1 TABLET ORAL NIGHTLY
Qty: 60 TABLET | Refills: 2 | Status: SHIPPED | OUTPATIENT
Start: 2023-08-18 | End: 2023-11-16

## 2023-08-18 RX ORDER — MEMANTINE HYDROCHLORIDE 10 MG/1
10 TABLET ORAL DAILY
Qty: 60 TABLET | Refills: 5 | Status: SHIPPED | OUTPATIENT
Start: 2023-08-18

## 2023-08-29 ENCOUNTER — HOSPITAL ENCOUNTER (OUTPATIENT)
Dept: CARDIAC CATH/INVASIVE PROCEDURES | Age: 78
Discharge: HOME | End: 2023-08-29
Attending: INTERNAL MEDICINE | Admitting: INTERNAL MEDICINE
Payer: MEDICARE

## 2023-08-29 VITALS
WEIGHT: 200 LBS | SYSTOLIC BLOOD PRESSURE: 119 MMHG | OXYGEN SATURATION: 94 % | HEIGHT: 67 IN | RESPIRATION RATE: 16 BRPM | BODY MASS INDEX: 31.39 KG/M2 | DIASTOLIC BLOOD PRESSURE: 61 MMHG | TEMPERATURE: 97.1 F | HEART RATE: 55 BPM

## 2023-08-29 LAB
ANION GAP SERPL CALCULATED.3IONS-SCNC: 7 MMOL/L (ref 7–19)
BUN SERPL-MCNC: 22 MG/DL (ref 8–23)
CALCIUM SERPL-MCNC: 9.1 MG/DL (ref 8.8–10.2)
CHLORIDE SERPL-SCNC: 108 MMOL/L (ref 98–111)
CO2 SERPL-SCNC: 25 MMOL/L (ref 22–29)
CREAT SERPL-MCNC: 1.5 MG/DL (ref 0.5–1.2)
EKG P AXIS: 50 DEGREES
EKG P-R INTERVAL: 164 MS
EKG Q-T INTERVAL: 482 MS
EKG QRS DURATION: 122 MS
EKG QTC CALCULATION (BAZETT): 472 MS
EKG T AXIS: 64 DEGREES
ERYTHROCYTE [DISTWIDTH] IN BLOOD BY AUTOMATED COUNT: 16.2 % (ref 11.5–14.5)
GLUCOSE SERPL-MCNC: 96 MG/DL (ref 74–109)
HCT VFR BLD AUTO: 42.9 % (ref 42–52)
HGB BLD-MCNC: 13.2 G/DL (ref 14–18)
MCH RBC QN AUTO: 24.6 PG (ref 27–31)
MCHC RBC AUTO-ENTMCNC: 30.8 G/DL (ref 33–37)
MCV RBC AUTO: 80 FL (ref 80–94)
PLATELET # BLD AUTO: 239 K/UL (ref 130–400)
PMV BLD AUTO: 10.2 FL (ref 9.4–12.4)
POTASSIUM SERPL-SCNC: 4.5 MMOL/L (ref 3.5–5)
RBC # BLD AUTO: 5.36 M/UL (ref 4.7–6.1)
SODIUM SERPL-SCNC: 140 MMOL/L (ref 136–145)
WBC # BLD AUTO: 7.7 K/UL (ref 4.8–10.8)

## 2023-08-29 PROCEDURE — 85027 COMPLETE CBC AUTOMATED: CPT

## 2023-08-29 PROCEDURE — 93005 ELECTROCARDIOGRAM TRACING: CPT

## 2023-08-29 PROCEDURE — 99152 MOD SED SAME PHYS/QHP 5/>YRS: CPT

## 2023-08-29 PROCEDURE — 93459 L HRT ART/GRFT ANGIO: CPT | Performed by: INTERNAL MEDICINE

## 2023-08-29 PROCEDURE — 6360000004 HC RX CONTRAST MEDICATION: Performed by: INTERNAL MEDICINE

## 2023-08-29 PROCEDURE — C1760 CLOSURE DEV, VASC: HCPCS

## 2023-08-29 PROCEDURE — 2709999900 HC NON-CHARGEABLE SUPPLY

## 2023-08-29 PROCEDURE — 99152 MOD SED SAME PHYS/QHP 5/>YRS: CPT | Performed by: INTERNAL MEDICINE

## 2023-08-29 PROCEDURE — 36415 COLL VENOUS BLD VENIPUNCTURE: CPT

## 2023-08-29 PROCEDURE — 6360000002 HC RX W HCPCS

## 2023-08-29 PROCEDURE — 93459 L HRT ART/GRFT ANGIO: CPT

## 2023-08-29 PROCEDURE — C1769 GUIDE WIRE: HCPCS

## 2023-08-29 PROCEDURE — 80048 BASIC METABOLIC PNL TOTAL CA: CPT

## 2023-08-29 PROCEDURE — 6370000000 HC RX 637 (ALT 250 FOR IP): Performed by: INTERNAL MEDICINE

## 2023-08-29 PROCEDURE — 2580000003 HC RX 258: Performed by: INTERNAL MEDICINE

## 2023-08-29 PROCEDURE — 2500000003 HC RX 250 WO HCPCS

## 2023-08-29 PROCEDURE — C1894 INTRO/SHEATH, NON-LASER: HCPCS

## 2023-08-29 RX ORDER — ASPIRIN 81 MG/1
81 TABLET ORAL ONCE
Status: COMPLETED | OUTPATIENT
Start: 2023-08-29 | End: 2023-08-29

## 2023-08-29 RX ORDER — SODIUM CHLORIDE 0.9 % (FLUSH) 0.9 %
5-40 SYRINGE (ML) INJECTION EVERY 12 HOURS SCHEDULED
Status: DISCONTINUED | OUTPATIENT
Start: 2023-08-29 | End: 2023-08-29 | Stop reason: HOSPADM

## 2023-08-29 RX ORDER — ONDANSETRON 2 MG/ML
4 INJECTION INTRAMUSCULAR; INTRAVENOUS EVERY 6 HOURS PRN
Status: DISCONTINUED | OUTPATIENT
Start: 2023-08-29 | End: 2023-08-29 | Stop reason: HOSPADM

## 2023-08-29 RX ORDER — SODIUM CHLORIDE 9 MG/ML
INJECTION, SOLUTION INTRAVENOUS CONTINUOUS
Status: DISCONTINUED | OUTPATIENT
Start: 2023-08-29 | End: 2023-08-29 | Stop reason: HOSPADM

## 2023-08-29 RX ORDER — SODIUM CHLORIDE 0.9 % (FLUSH) 0.9 %
5-40 SYRINGE (ML) INJECTION PRN
Status: DISCONTINUED | OUTPATIENT
Start: 2023-08-29 | End: 2023-08-29 | Stop reason: HOSPADM

## 2023-08-29 RX ORDER — ACETAMINOPHEN 325 MG/1
325 TABLET ORAL EVERY 4 HOURS PRN
Status: DISCONTINUED | OUTPATIENT
Start: 2023-08-29 | End: 2023-08-29 | Stop reason: HOSPADM

## 2023-08-29 RX ORDER — ACETAMINOPHEN 325 MG/1
650 TABLET ORAL EVERY 4 HOURS PRN
Status: DISCONTINUED | OUTPATIENT
Start: 2023-08-29 | End: 2023-08-29 | Stop reason: HOSPADM

## 2023-08-29 RX ORDER — SODIUM CHLORIDE 9 MG/ML
INJECTION, SOLUTION INTRAVENOUS PRN
Status: DISCONTINUED | OUTPATIENT
Start: 2023-08-29 | End: 2023-08-29 | Stop reason: HOSPADM

## 2023-08-29 RX ADMIN — IOPAMIDOL 119 ML: 612 INJECTION, SOLUTION INTRAVENOUS at 09:57

## 2023-08-29 RX ADMIN — SODIUM CHLORIDE: 9 INJECTION, SOLUTION INTRAVENOUS at 08:57

## 2023-08-29 RX ADMIN — ASPIRIN 81 MG: 81 TABLET, COATED ORAL at 09:00

## 2023-08-29 RX ADMIN — ACETAMINOPHEN 650 MG: 325 TABLET ORAL at 11:45

## 2023-08-29 ASSESSMENT — PAIN SCALES - GENERAL: PAINLEVEL_OUTOF10: 8

## 2023-08-29 ASSESSMENT — PAIN DESCRIPTION - ORIENTATION: ORIENTATION: LEFT

## 2023-08-29 ASSESSMENT — PAIN DESCRIPTION - DESCRIPTORS: DESCRIPTORS: THROBBING

## 2023-08-29 ASSESSMENT — PAIN DESCRIPTION - LOCATION: LOCATION: KNEE

## 2023-08-29 NOTE — DISCHARGE INSTRUCTIONS
How can you care for yourself at home? Activity   Do not do strenuous exercise and do not lift, pull, or push anything heavy until your doctor says it is okay. This may be for a day or two. You can walk around the house and do light activity, such as cooking. You may shower 24 to 48 hours after the procedure, if your doctor okays it. Pat the incision dry. Do not take a bath for 1 week, or until your doctor tells you it is okay. If the catheter was placed in your groin, try not to walk up stairs for the first couple of days. If the catheter was placed in your arm near your wrist, do not bend your wrist deeply for the first couple of days. Be careful using your hand to get into and out of a chair or bed. If your doctor recommends it, get more exercise. Walking is a good choice. Bit by bit, increase the amount you walk every day. Try for at least 30 minutes on most days of the week. Diet   Drink plenty of fluids to help your body flush out the dye. If you have kidney, heart, or liver disease and have to limit fluids, talk with your doctor before you increase the amount of fluids you drink. Keep eating a heart-healthy diet that has lots of fruits, vegetables, and whole grains. If you have not been eating this way, talk to your doctor. You also may want to talk to a dietitian. This expert can help you to learn about healthy foods and plan meals. Medicines   Your doctor will tell you if and when you can restart your medicines. He or she will also give you instructions about taking any new medicines. If you take blood thinners, such as warfarin (Coumadin), clopidogrel (Plavix), or aspirin, be sure to talk to your doctor. He or she will tell you if and when to start taking those medicines again. Make sure that you understand exactly what your doctor wants you to do. Your doctor may prescribe a blood-thinning medicine like aspirin or clopidogrel (Plavix).  It is very important that you take these medicines exactly as directed in order to keep the coronary artery open and reduce your risk of a heart attack. Be safe with medicines. Call your doctor if you think you are having a problem with your medicine. Care of the catheter site   For the first 3 days, keep a bandage over the spot where the catheter was inserted. Put ice or a cold pack on the area for 10 to 20 minutes at a time to help with soreness or swelling. Put a thin cloth between the ice and your skin. Follow-up care is a key part of your treatment and safety. Be sure to make and go to all appointments, and call your doctor if you are having problems. It's also a good idea to know your test results and keep a list of the medicines you take. When should you call for help? Call 911 anytime you think you may need emergency care. For example, call if:   You passed out (lost consciousness). You have severe trouble breathing. You have sudden chest pain and shortness of breath, or you cough up blood. You have symptoms of a heart attack. These may include:   Chest pain or pressure, or a strange feeling in the chest.   Sweating. Shortness of breath. Nausea or vomiting. Pain, pressure, or a strange feeling in the back, neck, jaw, or upper belly, or in one or both shoulders or arms. Lightheadedness or sudden weakness. A fast or irregular heartbeat. After you call 911, the  may tel you to chew 1 adult-strength or 2 to 4 low-dose aspirin. Wait for an ambulance. Do not try to drive yourself. You have been diagnosed with angina, and you have symptoms that do not go away with rest or are not getting better within 5 minutes after you take a dose of nitroglycerin. Call your doctor now or seek immediate medical care if:   You are bleeding from the area where the catheter was put in your artery. You have a fast-growing, painful lump at the catheter site. You have signs of infection, such as: Increased pain, swelling, warmth, or redness.

## 2023-08-29 NOTE — FLOWSHEET NOTE
Pt tolerated heart cath without problems. Right groin remianed clear of bleeding and dressing remained dry. No c/o chest pain. Patient did c/o left knee hurting, which he states does sometimes. Tylenol 325mg x 2 tabs was given to patient with voiced  Relief. After bedrest, patient was assisted up to ambulate in hallway with RN and tolerated activity without pain or bleeding. Discharge instructions were explained to patient with voiced understanding from patient and wife. Pt was then dressed and discharged home with wife and patient was stable.

## 2023-09-05 ENCOUNTER — TELEPHONE (OUTPATIENT)
Dept: CARDIOLOGY CLINIC | Age: 78
End: 2023-09-05

## 2023-09-05 NOTE — TELEPHONE ENCOUNTER
Pt wife called stating pt states his cath area is bruised and a bit sore today it has been 5 days since procedure. No redness, no swelling, not hot to touch. Pt advised to monitor area and let us know if pain cont and site gets red or hot to touch. Pt verbally understood.

## 2023-09-25 ENCOUNTER — OFFICE VISIT (OUTPATIENT)
Dept: NEUROLOGY | Age: 78
End: 2023-09-25
Payer: MEDICARE

## 2023-09-25 VITALS
HEART RATE: 56 BPM | DIASTOLIC BLOOD PRESSURE: 80 MMHG | RESPIRATION RATE: 18 BRPM | WEIGHT: 197 LBS | BODY MASS INDEX: 30.92 KG/M2 | SYSTOLIC BLOOD PRESSURE: 118 MMHG | HEIGHT: 67 IN

## 2023-09-25 DIAGNOSIS — F02.B0: Primary | ICD-10-CM

## 2023-09-25 DIAGNOSIS — G47.33 OBSTRUCTIVE SLEEP APNEA: ICD-10-CM

## 2023-09-25 DIAGNOSIS — G31.01: Primary | ICD-10-CM

## 2023-09-25 DIAGNOSIS — G47.52 REM SLEEP BEHAVIOR DISORDER: ICD-10-CM

## 2023-09-25 PROCEDURE — 99213 OFFICE O/P EST LOW 20 MIN: CPT | Performed by: PSYCHIATRY & NEUROLOGY

## 2023-09-25 PROCEDURE — G8417 CALC BMI ABV UP PARAM F/U: HCPCS | Performed by: PSYCHIATRY & NEUROLOGY

## 2023-09-25 PROCEDURE — G8427 DOCREV CUR MEDS BY ELIG CLIN: HCPCS | Performed by: PSYCHIATRY & NEUROLOGY

## 2023-09-25 PROCEDURE — 1036F TOBACCO NON-USER: CPT | Performed by: PSYCHIATRY & NEUROLOGY

## 2023-09-25 PROCEDURE — 1123F ACP DISCUSS/DSCN MKR DOCD: CPT | Performed by: PSYCHIATRY & NEUROLOGY

## 2023-09-25 NOTE — PROGRESS NOTES
[facility]  [address] [facility], [address] [place] [address], [address] [place] [address]  Phone [phone]  Fax [phone]     [facility] Follow Up Encounter  23 11:29 AM CDT    Information:   Patient Name: Sravan Kaur  :   1945  Age:   77 y.o. MRN:   [id number]  Account #:  [de-identified]  Today:  23    Provider: Helen Wagner M.D. Chief Complaint:   Chief Complaint   Patient presents with    Follow-up       Subjective:   Sravan Kaur is a 77 y.o. man Pick's disease, obstructive sleep apnea, and RSBD who is following up. He has not had any stroke symptoms. He continues to have worsening memory. He has to be told what to do but is still mostly independent with personal care. He has had no evident hallucinations or delusions. He has had a few episodes of active sleep. He does take clonazepam for that. He uses his CPAP every night. He is still drowsy and naps in the daytime.         Objective:     Past Medical History:  Past Medical History:   Diagnosis Date    Acute sinusitis     Arthritis     Atherosclerosis of coronary artery bypass graft(s) without angina pectoris     Bone pain     BPH (benign prostatic hyperplasia)     Cardiovascular disease     Chest pain     pressure    Chronic systolic (congestive) heart failure 10/10/2022    CPAP (continuous positive airway pressure) dependence 2018    8cm    Diarrhea     Dyspnea on exertion     ED (erectile dysfunction)     Glaucoma     pre glaucoma    Hyperlipidemia     Hypertension     Hypoglycemia, unspecified     Kidney stones     Kidney stones     Left shoulder pain     Low back pain     Memory disorder     Nocturia     Obstructive sleep apnea     AHI:  47 per [facility] in [place], 3/2018    Other fatigue     Polycythemia     REM sleep behavior disorder     Sleep terrors     Tachyarrhythmia     Unstable angina ([address])        Past Surgical History:   Procedure Laterality Date    APPENDECTOMY      CORONARY ARTERY BYPASS GRAFT      CORONARY ARTERY

## 2023-09-26 ENCOUNTER — OFFICE VISIT (OUTPATIENT)
Dept: CARDIOLOGY CLINIC | Age: 78
End: 2023-09-26
Payer: MEDICARE

## 2023-09-26 VITALS
BODY MASS INDEX: 31.55 KG/M2 | SYSTOLIC BLOOD PRESSURE: 128 MMHG | OXYGEN SATURATION: 97 % | DIASTOLIC BLOOD PRESSURE: 84 MMHG | HEART RATE: 51 BPM | HEIGHT: 67 IN | WEIGHT: 201 LBS

## 2023-09-26 DIAGNOSIS — I47.29 VENTRICULAR TACHYCARDIA (PAROXYSMAL) (HCC): ICD-10-CM

## 2023-09-26 DIAGNOSIS — Z79.899 ON AMIODARONE THERAPY: ICD-10-CM

## 2023-09-26 DIAGNOSIS — I25.10 CORONARY ARTERY DISEASE INVOLVING NATIVE CORONARY ARTERY OF NATIVE HEART WITHOUT ANGINA PECTORIS: Primary | ICD-10-CM

## 2023-09-26 DIAGNOSIS — I10 ESSENTIAL HYPERTENSION: ICD-10-CM

## 2023-09-26 PROCEDURE — 1036F TOBACCO NON-USER: CPT | Performed by: CLINICAL NURSE SPECIALIST

## 2023-09-26 PROCEDURE — 3079F DIAST BP 80-89 MM HG: CPT | Performed by: CLINICAL NURSE SPECIALIST

## 2023-09-26 PROCEDURE — G8417 CALC BMI ABV UP PARAM F/U: HCPCS | Performed by: CLINICAL NURSE SPECIALIST

## 2023-09-26 PROCEDURE — 3074F SYST BP LT 130 MM HG: CPT | Performed by: CLINICAL NURSE SPECIALIST

## 2023-09-26 PROCEDURE — 99214 OFFICE O/P EST MOD 30 MIN: CPT | Performed by: CLINICAL NURSE SPECIALIST

## 2023-09-26 PROCEDURE — G8427 DOCREV CUR MEDS BY ELIG CLIN: HCPCS | Performed by: CLINICAL NURSE SPECIALIST

## 2023-09-26 PROCEDURE — 1123F ACP DISCUSS/DSCN MKR DOCD: CPT | Performed by: CLINICAL NURSE SPECIALIST

## 2023-09-26 NOTE — PATIENT INSTRUCTIONS
Labs with next set for DR VARGAS [facility] good blood pressure control-goal<130/80 at rest  Maintain good cholesterol control LDL goal<70 with arterial disease  If you are diabetic work to keep/obtain hemoglobin A1c< 7    Follow up in 6 mos with PFTs prior  with Dr. Melissa Grant  Call with any questions or concerns  Follow up with Melita Vicente MD for non cardiac problems  Report any new problems  Cardiovascular Fitness-Exercise as tolerated. Strive for 30 minutes of exercise most days of the week. Cardiac / Healthy Diet- Avoid processed high fat foods, maintain low sodium/salt   Continue current medications as directed  Continue plan of treatment  It is always recommended that you bring your medications bottles with you to each visit - this is for your safety!

## 2023-09-26 NOTE — PROGRESS NOTES
ulcers or dermatitis. Neurological - No focal signs are identified. Oriented to person, place and time. Psychiatric -  Appropriate affect and mood. Assessment:     Diagnosis Orders   1. Coronary artery disease involving native coronary artery of native heart without angina pectoris        2. Essential hypertension        3. Ventricular tachycardia (paroxysmal) ([address])        4. On amiodarone therapy  TSH    T4, Free    Comprehensive Metabolic Panel    Full PFT Study With Bronchodilator        Data:  BP Readings from Last 3 Encounters:   09/26/23 128/84   09/25/23 118/80   08/29/23 119/61    Pulse Readings from Last 3 Encounters:   09/26/23 51   09/25/23 56   08/29/23 55        Wt Readings from Last 3 Encounters:   09/26/23 201 lb (91.2 kg)   09/25/23 197 lb (89.4 kg)   08/29/23 200 lb (90.7 kg)   Blood pressure and heart rate controlled. Medical management includes  Beta-blocker, remains on amiodarone for rhythm control along with statin. Takes low-dose aspirin    No significant occlusive disease needing intervention with recent heart catheterization. Patient is on amiodarone with no recurrent arrhythmias. We discussed need to repeat monitor. At this time we will discontinue medications. We will need appropriate labs and PFTs for monitoring of side effects from the amiodarone therapy. Memory concerns being addressed by neurology    8/29/2023 angiogram, Left Heart   Cath, Left Ventriculogram, Left Ventricular Pressure Measurement      Conclusions    Normal LV systolic function. Apical hypokinesis   Multivessel coronary artery disease   95% proximal RCA   100% mid LAD   80% Diagonal   50% smooth circumflex   Patent LIMA graft to diagonal sequential to LAD   Patent SVG to distal RCA      Recommendations      Routine Post Diagnostic Cath orders. Risk factor modification.    Medical management.   ------------------------------------------------   Electronically signed by Ese Villar MD(Performing

## 2023-12-05 RX ORDER — AMIODARONE HYDROCHLORIDE 200 MG/1
200 TABLET ORAL DAILY
Qty: 30 TABLET | Refills: 5 | Status: SHIPPED | OUTPATIENT
Start: 2023-12-05

## 2024-02-16 DIAGNOSIS — G47.52 REM SLEEP BEHAVIOR DISORDER: ICD-10-CM

## 2024-02-16 RX ORDER — CLONAZEPAM 0.5 MG/1
TABLET ORAL
Qty: 60 TABLET | Refills: 2 | Status: SHIPPED | OUTPATIENT
Start: 2024-02-16 | End: 2024-03-17

## 2024-02-16 NOTE — TELEPHONE ENCOUNTER
Requested Prescriptions     Pending Prescriptions Disp Refills    clonazePAM (KLONOPIN) 0.5 MG tablet [Pharmacy Med Name: CLONAZEPAM 0.5 MG TABLET] 60 tablet 2     Sig: TAKE TWO TABLETS BY MOUTH EVERY NIGHT AT BEDTIME. MAX DAILY AMOUNT OF 1MG       Last Office Visit:  9/25/2023  Next Office Visit:  4/1/2024  Last Medication Refill:  8/18/2023 with 2 THAIS Auguste up to date:  2/16/2024    *RX updated to reflect   2/16/2024  fill date*

## 2024-03-26 ENCOUNTER — OFFICE VISIT (OUTPATIENT)
Dept: CARDIOLOGY CLINIC | Age: 79
End: 2024-03-26
Payer: MEDICARE

## 2024-03-26 ENCOUNTER — HOSPITAL ENCOUNTER (OUTPATIENT)
Dept: PULMONOLOGY | Age: 79
Discharge: HOME | End: 2024-03-26
Payer: MEDICARE

## 2024-03-26 VITALS
DIASTOLIC BLOOD PRESSURE: 58 MMHG | WEIGHT: 196 LBS | HEART RATE: 59 BPM | OXYGEN SATURATION: 96 % | HEIGHT: 67 IN | BODY MASS INDEX: 30.76 KG/M2 | SYSTOLIC BLOOD PRESSURE: 128 MMHG

## 2024-03-26 VITALS — OXYGEN SATURATION: 97 % | HEIGHT: 67 IN | BODY MASS INDEX: 30.45 KG/M2 | WEIGHT: 194 LBS | RESPIRATION RATE: 16 BRPM

## 2024-03-26 DIAGNOSIS — I10 ESSENTIAL HYPERTENSION: ICD-10-CM

## 2024-03-26 DIAGNOSIS — Z79.899 ON AMIODARONE THERAPY: ICD-10-CM

## 2024-03-26 DIAGNOSIS — I25.10 CORONARY ARTERY DISEASE INVOLVING NATIVE CORONARY ARTERY OF NATIVE HEART WITHOUT ANGINA PECTORIS: Primary | ICD-10-CM

## 2024-03-26 DIAGNOSIS — E78.2 MIXED HYPERLIPIDEMIA: ICD-10-CM

## 2024-03-26 PROCEDURE — 94726 PLETHYSMOGRAPHY LUNG VOLUMES: CPT

## 2024-03-26 PROCEDURE — 1036F TOBACCO NON-USER: CPT | Performed by: CLINICAL NURSE SPECIALIST

## 2024-03-26 PROCEDURE — 94010 BREATHING CAPACITY TEST: CPT

## 2024-03-26 PROCEDURE — 94729 DIFFUSING CAPACITY: CPT

## 2024-03-26 PROCEDURE — G8417 CALC BMI ABV UP PARAM F/U: HCPCS | Performed by: CLINICAL NURSE SPECIALIST

## 2024-03-26 PROCEDURE — 6360000002 HC RX W HCPCS: Performed by: CLINICAL NURSE SPECIALIST

## 2024-03-26 PROCEDURE — G8484 FLU IMMUNIZE NO ADMIN: HCPCS | Performed by: CLINICAL NURSE SPECIALIST

## 2024-03-26 PROCEDURE — 99214 OFFICE O/P EST MOD 30 MIN: CPT | Performed by: CLINICAL NURSE SPECIALIST

## 2024-03-26 PROCEDURE — G8427 DOCREV CUR MEDS BY ELIG CLIN: HCPCS | Performed by: CLINICAL NURSE SPECIALIST

## 2024-03-26 PROCEDURE — 1123F ACP DISCUSS/DSCN MKR DOCD: CPT | Performed by: CLINICAL NURSE SPECIALIST

## 2024-03-26 PROCEDURE — 3078F DIAST BP <80 MM HG: CPT | Performed by: CLINICAL NURSE SPECIALIST

## 2024-03-26 PROCEDURE — 3074F SYST BP LT 130 MM HG: CPT | Performed by: CLINICAL NURSE SPECIALIST

## 2024-03-26 PROCEDURE — 94060 EVALUATION OF WHEEZING: CPT

## 2024-03-26 RX ORDER — ALBUTEROL SULFATE 2.5 MG/3ML
2.5 SOLUTION RESPIRATORY (INHALATION) 2 TIMES DAILY PRN
Status: DISCONTINUED | OUTPATIENT
Start: 2024-03-26 | End: 2024-03-28 | Stop reason: HOSPADM

## 2024-03-26 RX ADMIN — ALBUTEROL SULFATE 2.5 MG: 2.5 SOLUTION RESPIRATORY (INHALATION) at 09:40

## 2024-03-26 NOTE — PROCEDURES
Media Information          Pulmonary Function Study    Interpretation:    The FVC is mildly reduced. FEV1 is Normal. FEV1/FVC ratio is Normal. After bronchodilator therapy there was no significant change in FEV1. Total lung capacity is Normal. Residual volume is Normal.      Diffusing lung capacity when corrected for alveolar volume is Normal.         Impression:    Normal pulmonary function.         Eileen Ceballos MD, FCCP, [facility]

## 2024-03-26 NOTE — PATIENT INSTRUCTIONS
Maintain good blood pressure control-goal<130/80 at rest  Maintain good cholesterol control LDL goal<70 with arterial disease  If you are diabetic work to keep/obtain hemoglobin A1c< 7    Follow up in 6 mos with Dr Malave   Call with any questions or concerns  Follow up with Darren Ribera MD for non cardiac problems  Report any new problems  Cardiovascular Fitness-Exercise as tolerated.  Strive for 30 minutes of exercise most days of the week.    Cardiac / Healthy Diet- Avoid processed high fat foods, maintain low sodium/salt   Continue current medications as directed  Continue plan of treatment  It is always recommended that you bring your medications bottles with you to each visit - this is for your safety!

## 2024-03-26 NOTE — PROGRESS NOTES
Arteries and Lesion Findings     LAD:       Lesion on Mid LAD: Mid subsection.100% stenosis 12 mm length. Pre    procedure DAVID 0 flow was noted. Poor runoff was present.Chronic total    occlusion.       Lesion on 1st Diag: Ostial.80% stenosis 9 mm length. Pre procedure DAVID    III flow was noted. Good runoff was present.     LCx:       Lesion on Mid CX: Mid subsection.50% stenosis 14 mm length. Pre procedure    DAVID III flow was noted. Good runoff was present.     RCA:       Lesion on Prox RCA: Proximal subsection.95% stenosis 19 mm length. Pre    procedure DAVID III flow was noted. Good runoff was present.     Cardiac Grafts      -  There is a Vein graft that originates at the Aorta Right and attaches      to the Dist RCA.      -  There is a LIMA graft that originates at the LIMA and attaches to the      Dist LAD.      States taking medications as prescribed    30 minutes were spent preparing, reviewing and seeing patient.  All questions answered    Plan      Maintain good blood pressure control-goal<130/80 at rest  Maintain good cholesterol control LDL goal<70 with arterial disease  If you are diabetic work to keep/obtain hemoglobin A1c< 7    Follow up in 6 mos with Dr Malave   Call with any questions or concerns  Follow up with Darren Ribera MD for non cardiac problems  Report any new problems  Cardiovascular Fitness-Exercise as tolerated.  Strive for 30 minutes of exercise most days of the week.    Cardiac / Healthy Diet- Avoid processed high fat foods, maintain low sodium/salt   Continue current medications as directed  Continue plan of treatment  It is always recommended that you bring your medications bottles with you to each visit - this is for your safety!       NAYA Thomson dragon/transcription disclaimer: Much of this encounter note is electronic transcription/translation of spoken language to printed tach. Electronic translation of spoken language may be erroneous, or at times,

## 2024-03-31 PROBLEM — Z79.899 ON AMIODARONE THERAPY: Status: ACTIVE | Noted: 2024-03-31

## 2024-04-01 ENCOUNTER — OFFICE VISIT (OUTPATIENT)
Dept: NEUROLOGY | Age: 79
End: 2024-04-01
Payer: MEDICARE

## 2024-04-01 VITALS
BODY MASS INDEX: 30.45 KG/M2 | SYSTOLIC BLOOD PRESSURE: 126 MMHG | RESPIRATION RATE: 16 BRPM | DIASTOLIC BLOOD PRESSURE: 64 MMHG | HEIGHT: 67 IN | WEIGHT: 194 LBS | HEART RATE: 56 BPM

## 2024-04-01 DIAGNOSIS — G31.01: Primary | ICD-10-CM

## 2024-04-01 DIAGNOSIS — G47.52 REM SLEEP BEHAVIOR DISORDER: ICD-10-CM

## 2024-04-01 DIAGNOSIS — G47.33 OBSTRUCTIVE SLEEP APNEA: ICD-10-CM

## 2024-04-01 DIAGNOSIS — F02.B0: Primary | ICD-10-CM

## 2024-04-01 PROCEDURE — 1036F TOBACCO NON-USER: CPT | Performed by: PSYCHIATRY & NEUROLOGY

## 2024-04-01 PROCEDURE — 1123F ACP DISCUSS/DSCN MKR DOCD: CPT | Performed by: PSYCHIATRY & NEUROLOGY

## 2024-04-01 PROCEDURE — 99213 OFFICE O/P EST LOW 20 MIN: CPT | Performed by: PSYCHIATRY & NEUROLOGY

## 2024-04-01 PROCEDURE — G8427 DOCREV CUR MEDS BY ELIG CLIN: HCPCS | Performed by: PSYCHIATRY & NEUROLOGY

## 2024-04-01 PROCEDURE — G8417 CALC BMI ABV UP PARAM F/U: HCPCS | Performed by: PSYCHIATRY & NEUROLOGY

## 2024-04-01 RX ORDER — CLONAZEPAM 0.5 MG/1
TABLET ORAL
Qty: 60 TABLET | Refills: 5 | Status: SHIPPED | OUTPATIENT
Start: 2024-04-01 | End: 2024-10-01

## 2024-04-01 RX ORDER — MEMANTINE HYDROCHLORIDE 10 MG/1
10 TABLET ORAL 2 TIMES DAILY
Qty: 60 TABLET | Refills: 11 | Status: SHIPPED | OUTPATIENT
Start: 2024-04-01

## 2024-04-01 NOTE — PROGRESS NOTES
visual disturbance and photophobia  HENMT: negative for - headaches and sinus pain  Respiratory: negative for - cough, hemoptysis, and shortness of breath  Cardiovascular: negative for - chest pain and palpitations  Gastrointestinal: negative for - blood in stools, constipation, diarrhea, nausea, and vomiting  Genito-Urinary: negative for - hematuria, urinary frequency, urinary urgency, and urinary retention  Musculoskeletal: positive for - joint pain, joint stiffness, and joint swelling  Hematological and Lymphatic: negative for - bleeding problems, abnormal bruising, and swollen lymph nodes  Endocrine:  negative for - polydipsia and polyphagia  Allergy/Immunology:  negative for - rhinorrhea, sinus congestion, hives  Integument:  negative for - negative for - rash, change in moles, new or changing lesions  Psychological: negative for - anxiety and depression  Neurological: positive for - memory loss  Negative for - numbness/tingling, and weakness      PHYSICAL EXAMINATION:  Vitals:  /64   Pulse 56   Resp 16   Ht 1.702 m (5' 7\")   Wt 88 kg (194 lb)   BMI 30.38 kg/m²   General appearance:  Alert, well developed, well nourished, in no distress  HEENT:  normocephalic, atraumatic, sclera appear normal, no nasal abnormalities, no rhinorrhea, Ears appear normal, oral mucous membranes are moist without erythema, trachea midline, thyroid is normal, no lymphadenopathy or neck mass.  Cardiovascular:  Regular rate and rhythm without murmer.  No peripheral edema, No cyanosis or clubbing.  No carotid bruits.  Pulmonary:  Lungs are clear to auscultation.  Breathing appears normal, good expansion, normal effort without use of accessory muscles  Musculoskeletal:  Joints are osteoarthritic  Integument:  No rash, erythema, or pallor  Psychiatric:  Mood, affect, and behavior appear normal      NEUROLOGIC EXAMINATION:  Mental Status:  alert, oriented to person, place, and time.  Speech:  Clear without dysarthria or

## 2024-04-01 NOTE — PATIENT INSTRUCTIONS
INSTRUCTIONS:  Take the Namenda (mementine) 10 mg twice a day  Continue using the APAP during sleep

## 2024-04-02 ENCOUNTER — TELEPHONE (OUTPATIENT)
Dept: CARDIOLOGY CLINIC | Age: 79
End: 2024-04-02

## 2024-04-02 NOTE — TELEPHONE ENCOUNTER
----- Message from NAYA Ray sent at 4/2/2024  8:30 AM CDT -----  Let him know that his lung study looked good.  No problem with the amiodarone affecting his lungs at this point.

## 2024-04-08 ENCOUNTER — TELEPHONE (OUTPATIENT)
Dept: NEUROLOGY | Age: 79
End: 2024-04-08

## 2024-04-08 NOTE — TELEPHONE ENCOUNTER
The pt wife called and stated that the pt CPAP machine has stopped working. They requested an order for a new one sent to [facility].

## 2024-05-31 RX ORDER — AMIODARONE HYDROCHLORIDE 200 MG/1
200 TABLET ORAL DAILY
Qty: 30 TABLET | Refills: 5 | Status: SHIPPED | OUTPATIENT
Start: 2024-05-31

## 2024-06-10 ENCOUNTER — TELEPHONE (OUTPATIENT)
Dept: NEUROLOGY | Age: 79
End: 2024-06-10

## 2024-06-10 NOTE — TELEPHONE ENCOUNTER
Pt needs a medicare compliance appointment for his new cpap machine.  PSC unable to schedule within timeframe.  Please review.

## 2024-06-11 NOTE — TELEPHONE ENCOUNTER
Called and left patient a VM to let him know when I have him scheduled an appointment with Dr. Andre after being setup on his DME sleep machine.

## 2024-07-09 ENCOUNTER — OFFICE VISIT (OUTPATIENT)
Dept: NEUROLOGY | Age: 79
End: 2024-07-09
Payer: MEDICARE

## 2024-07-09 VITALS
SYSTOLIC BLOOD PRESSURE: 110 MMHG | BODY MASS INDEX: 30.76 KG/M2 | DIASTOLIC BLOOD PRESSURE: 66 MMHG | HEIGHT: 67 IN | RESPIRATION RATE: 20 BRPM | HEART RATE: 61 BPM | WEIGHT: 196 LBS | OXYGEN SATURATION: 96 %

## 2024-07-09 DIAGNOSIS — Z79.899 ON LONG TERM DRUG THERAPY: ICD-10-CM

## 2024-07-09 DIAGNOSIS — G47.33 OBSTRUCTIVE SLEEP APNEA: Primary | ICD-10-CM

## 2024-07-09 DIAGNOSIS — G31.01: ICD-10-CM

## 2024-07-09 DIAGNOSIS — F02.B0: ICD-10-CM

## 2024-07-09 DIAGNOSIS — G47.52 REM SLEEP BEHAVIOR DISORDER: ICD-10-CM

## 2024-07-09 LAB
AMPHET UR QL SCN: NEGATIVE
BARBITURATES UR QL SCN: NEGATIVE
BENZODIAZ UR QL SCN: NEGATIVE
BUPRENORPHINE URINE: NEGATIVE
CANNABINOIDS UR QL SCN: NEGATIVE
COCAINE UR QL SCN: NEGATIVE
DRUG SCREEN COMMENT UR-IMP: NORMAL
FENTANYL SCREEN, URINE: NEGATIVE
METHADONE UR QL SCN: NEGATIVE
METHAMPHETAMINE, URINE: NEGATIVE
OPIATES UR QL SCN: NEGATIVE
OXYCODONE UR QL SCN: NEGATIVE
PCP UR QL SCN: NEGATIVE
TRICYCLIC ANTIDEPRESSANTS, UR: NEGATIVE

## 2024-07-09 PROCEDURE — 1036F TOBACCO NON-USER: CPT | Performed by: PSYCHIATRY & NEUROLOGY

## 2024-07-09 PROCEDURE — 1123F ACP DISCUSS/DSCN MKR DOCD: CPT | Performed by: PSYCHIATRY & NEUROLOGY

## 2024-07-09 PROCEDURE — G8417 CALC BMI ABV UP PARAM F/U: HCPCS | Performed by: PSYCHIATRY & NEUROLOGY

## 2024-07-09 PROCEDURE — 99213 OFFICE O/P EST LOW 20 MIN: CPT | Performed by: PSYCHIATRY & NEUROLOGY

## 2024-07-09 PROCEDURE — G8427 DOCREV CUR MEDS BY ELIG CLIN: HCPCS | Performed by: PSYCHIATRY & NEUROLOGY

## 2024-07-09 RX ORDER — ACETAMINOPHEN 325 MG/1
650 TABLET ORAL EVERY 4 HOURS PRN
COMMUNITY

## 2024-07-09 NOTE — PROGRESS NOTES
[facility]  [address], [address]  [place] [address]  Phone [phone]  Fax [phone]     [facility] Follow Up Encounter  24 10:48 AM CDT    Information:   Patient Name: Oli Leal  :   1945  Age:   79 y.o.  MRN:   [id number]  Account #:  [id number]  Today:  24    Provider: Kei Tiwari M.D.     Chief Complaint:   Chief Complaint   Patient presents with    Follow-up    Sleep Apnea     Patient states is here for new machine        Subjective:   Oli Leal is a 79 y.o. man with obstructive sleep apnea, RSBD, and Pick's disease who is following up.  He recently obtained a new CPAP machine.  He is using it nightly and resting better.  He has had few active dreams.  He does take clonazepam and tolerates it.  He has to be told what to do but is still mostly independent with personal care. He has had no evident hallucinations or delusions. He takes Namenda 10 mg BID and it seems to have helped some.      Objective:     Past Medical History:  Past Medical History:   Diagnosis Date    Acute sinusitis     Arthritis     Atherosclerosis of coronary artery bypass graft(s) without angina pectoris     Bone pain     BPH (benign prostatic hyperplasia)     Cardiovascular disease     Chest pain     pressure    Chronic systolic (congestive) heart failure 10/10/2022    CPAP (continuous positive airway pressure) dependence 2018    8cm    Diarrhea     Dyspnea on exertion     ED (erectile dysfunction)     Glaucoma     pre glaucoma    Hyperlipidemia     Hypertension     Hypoglycemia, unspecified     Kidney stones     Kidney stones     Left shoulder pain     Low back pain     Memory disorder     Nocturia     Obstructive sleep apnea     AHI:  47 per [facility] in [place], 3/2018    Other fatigue     Polycythemia     REM sleep behavior disorder     Sleep terrors     Tachyarrhythmia     Unstable angina (HCC)        Past Surgical History:   Procedure Laterality Date    APPENDECTOMY

## 2024-08-22 ENCOUNTER — TELEPHONE (OUTPATIENT)
Dept: CARDIOLOGY CLINIC | Age: 79
End: 2024-08-22

## 2024-08-22 ENCOUNTER — OFFICE VISIT (OUTPATIENT)
Dept: CARDIOLOGY CLINIC | Age: 79
End: 2024-08-22

## 2024-08-22 VITALS
BODY MASS INDEX: 30.29 KG/M2 | SYSTOLIC BLOOD PRESSURE: 124 MMHG | HEART RATE: 68 BPM | OXYGEN SATURATION: 94 % | DIASTOLIC BLOOD PRESSURE: 70 MMHG | HEIGHT: 67 IN | WEIGHT: 193 LBS

## 2024-08-22 DIAGNOSIS — R07.9 CHEST PAIN, UNSPECIFIED TYPE: ICD-10-CM

## 2024-08-22 DIAGNOSIS — I10 ESSENTIAL HYPERTENSION: ICD-10-CM

## 2024-08-22 DIAGNOSIS — I25.10 CORONARY ARTERY DISEASE INVOLVING NATIVE CORONARY ARTERY OF NATIVE HEART WITHOUT ANGINA PECTORIS: Primary | ICD-10-CM

## 2024-08-22 DIAGNOSIS — I47.29 VENTRICULAR TACHYCARDIA (PAROXYSMAL) (HCC): ICD-10-CM

## 2024-08-22 DIAGNOSIS — R55 PRE-SYNCOPE: ICD-10-CM

## 2024-08-22 RX ORDER — TERBINAFINE HYDROCHLORIDE 250 MG/1
250 TABLET ORAL DAILY
COMMUNITY

## 2024-08-22 NOTE — TELEPHONE ENCOUNTER
Spoke with patients wife and advised that patient can be seen today at 2:45. Medical records requested from [facility], I spoke with Yelitza. I also requested most recent records from PCP office.

## 2024-08-22 NOTE — TELEPHONE ENCOUNTER
Patients wife called to advise that pt was seen at [facility] recently for an episode of severe chest pain that lasted about 30 seconds. She states they didn't find anything abnormal at ED. His BP was 133/62 HR 56 that day. She states this morning he had an episode where he felt like collapsing. /75 HR 67. She is asking if he needs a heart monitor to see what's going on. I offered her an appt tomorrow at 8:15 with you but they couldn't make that. I scheduled with your next available at 8/27/2024. She was asking if pt could be worked in today. I did advise if he worsens to go to ED. You have a 2:45 appt open today.

## 2024-08-22 NOTE — PATIENT INSTRUCTIONS
Wear monitor for up to 2 weeks to assess for heart rate and rhythm  Maintain good blood pressure control-goal<130/80 at rest  Maintain good cholesterol control LDL goal<70 with arterial disease  If you are diabetic work to keep/obtain hemoglobin A1c< 7    Follow up in 6 mos with Dr Malave   Call with any questions or concerns  Follow up with Timo Cordoba MD for non cardiac problems  Report any new problems  Cardiovascular Fitness-Exercise as tolerated.  Strive for 30 minutes of exercise most days of the week.    Cardiac / Healthy Diet- Avoid processed high fat foods, maintain low sodium/salt   Continue current medications as directed  Continue plan of treatment  It is always recommended that you bring your medications bottles with you to each visit - this is for your safety!

## 2024-08-22 NOTE — PROGRESS NOTES
[facility] Card[facility]  [address] [address]  Phone: [phone]  Fax: [phone]    OFFICE VISIT:  2024    Oli Leal - : 1945    Reason For Visit:  Oli is a 79 y.o. male who is here for Follow-up ([facility] [place]     no symptoms), Coronary Artery Disease, and Hyperlipidemia  History of coronary disease with previous bypass, Parkinson's disease, ANGE, hypertension hyperlipidemia.  Was hospitalized last year with ventricular tachycardia at [facility].  He was treated with amiodarone.  At that time echo showed preserved LV systolic function with mild diastolic dysfunction and mild MR.  No other significant findings  Monitor worn for a week postdischarge showed normal sinus rhythm.  15 episodes of atrial tachycardia lasting 18 beats is the longest.  7 episodes of ventricular tachycardia with longest lasting 23 beats with an average heart rate 1 13-1 40.  Fastest at 156.  Low ectopy burden.    Was seen with Dr. Malave in follow-up on 2023.  Recommended nuclear stress test to assess for any ischemia causing arrhythmias.  Ongoing amiodarone for 200 mg daily after 2 months of twice daily therapy    Patient was seen in July in follow-up.  Recommended getting a stress test.-done in [place]    Patient underwent heart catheterization 2023 that showed normal LV size and function with patent bypass grafts.  No intervention.  Recommended ongoing medical management    Patient was seen in March doing well.  Recent visit to [facility] for chest pain no acute findings.  Called today with normal blood pressure but had episode of weakness.  Here today for evaluation    He is here today [facility] with his wife.  She reports he has been having sudden onset of feeling weak and almost falling with some presyncope.  No overt syncope.  Blood pressure and heart rate have been checked around those times and she has not noticed anything high or

## 2024-09-10 DIAGNOSIS — R55 PRE-SYNCOPE: ICD-10-CM

## 2024-09-10 DIAGNOSIS — I48.0 PAROXYSMAL A-FIB (HCC): Primary | ICD-10-CM

## 2024-09-10 DIAGNOSIS — I47.29 VENTRICULAR TACHYCARDIA (PAROXYSMAL) (HCC): ICD-10-CM

## 2024-09-10 PROCEDURE — 93228 REMOTE 30 DAY ECG REV/REPORT: CPT | Performed by: CLINICAL NURSE SPECIALIST

## 2024-10-07 ENCOUNTER — OFFICE VISIT (OUTPATIENT)
Dept: CARDIOLOGY CLINIC | Age: 79
End: 2024-10-07
Payer: MEDICARE

## 2024-10-07 ENCOUNTER — TELEPHONE (OUTPATIENT)
Dept: CARDIOLOGY CLINIC | Age: 79
End: 2024-10-07

## 2024-10-07 VITALS
WEIGHT: 196 LBS | DIASTOLIC BLOOD PRESSURE: 52 MMHG | HEART RATE: 56 BPM | OXYGEN SATURATION: 94 % | HEIGHT: 67 IN | BODY MASS INDEX: 30.76 KG/M2 | SYSTOLIC BLOOD PRESSURE: 90 MMHG

## 2024-10-07 DIAGNOSIS — G20.A1 PARKINSON'S DISEASE, UNSPECIFIED WHETHER DYSKINESIA PRESENT, UNSPECIFIED WHETHER MANIFESTATIONS FLUCTUATE (HCC): ICD-10-CM

## 2024-10-07 DIAGNOSIS — R42 DIZZINESS: ICD-10-CM

## 2024-10-07 DIAGNOSIS — I25.10 CORONARY ARTERY DISEASE INVOLVING NATIVE CORONARY ARTERY OF NATIVE HEART WITHOUT ANGINA PECTORIS: ICD-10-CM

## 2024-10-07 DIAGNOSIS — R55 NEAR SYNCOPE: Primary | ICD-10-CM

## 2024-10-07 DIAGNOSIS — I95.1 ORTHOSTATIC HYPOTENSION: ICD-10-CM

## 2024-10-07 DIAGNOSIS — Z79.899 ON AMIODARONE THERAPY: ICD-10-CM

## 2024-10-07 DIAGNOSIS — I47.29 VENTRICULAR TACHYCARDIA (PAROXYSMAL) (HCC): ICD-10-CM

## 2024-10-07 DIAGNOSIS — I50.22 CHRONIC SYSTOLIC (CONGESTIVE) HEART FAILURE (HCC): ICD-10-CM

## 2024-10-07 PROBLEM — I47.20 VENTRICULAR TACHYCARDIA (PAROXYSMAL): Status: ACTIVE | Noted: 2024-10-07

## 2024-10-07 PROBLEM — R07.9 CHEST PAIN: Status: RESOLVED | Noted: 2020-07-29 | Resolved: 2024-10-07

## 2024-10-07 PROCEDURE — G8484 FLU IMMUNIZE NO ADMIN: HCPCS | Performed by: CLINICAL NURSE SPECIALIST

## 2024-10-07 PROCEDURE — G8417 CALC BMI ABV UP PARAM F/U: HCPCS | Performed by: CLINICAL NURSE SPECIALIST

## 2024-10-07 PROCEDURE — 1123F ACP DISCUSS/DSCN MKR DOCD: CPT | Performed by: CLINICAL NURSE SPECIALIST

## 2024-10-07 PROCEDURE — 1036F TOBACCO NON-USER: CPT | Performed by: CLINICAL NURSE SPECIALIST

## 2024-10-07 PROCEDURE — 99214 OFFICE O/P EST MOD 30 MIN: CPT | Performed by: CLINICAL NURSE SPECIALIST

## 2024-10-07 PROCEDURE — G8427 DOCREV CUR MEDS BY ELIG CLIN: HCPCS | Performed by: CLINICAL NURSE SPECIALIST

## 2024-10-07 ASSESSMENT — ENCOUNTER SYMPTOMS
CHEST TIGHTNESS: 0
NAUSEA: 0
COUGH: 0
SHORTNESS OF BREATH: 0
WHEEZING: 0
FACIAL SWELLING: 0
VOMITING: 0
EYE REDNESS: 0
ABDOMINAL PAIN: 0

## 2024-10-07 NOTE — TELEPHONE ENCOUNTER
Patient is scheduled for Loop Recorder Insert on Tuesday, 10/15/24. Advised patient that he will be notified the day before with arrival time. Patient voiced understanding.

## 2024-10-07 NOTE — PROGRESS NOTES
[facility]  [address] [address]  Phone: [phone]  Fax: [phone]    OFFICE VISIT:  10/7/2024    Oli Leal - : 1945    Reason For Visit:  Oli is a 79 y.o. male who is here for Follow-up (Patient states is feeling dizzy and having SOB) and Coronary artery disease involving native coronary artery of       Diagnosis Orders   1. Near syncope        2. Dizziness        3. Coronary artery disease involving native coronary artery of native heart without angina pectoris        4. Chronic systolic (congestive) heart failure        5. Orthostatic hypotension        6. Ventricular tachycardia (paroxysmal) (HCC)        7. On amiodarone therapy        8. Parkinson's disease, unspecified whether dyskinesia present, unspecified whether manifestations fluctuate (HCC)              HPI  History of coronary disease with previous bypass, Parkinson's disease, ANGE, hypertension hyperlipidemia.  Was hospitalized with ventricular tachycardia at [facility].  He was treated with amiodarone.  At that time echo showed preserved LV systolic function with mild diastolic dysfunction and mild MR.  No other significant findings  Monitor worn for a week postdischarge showed normal sinus rhythm.  15 episodes of atrial tachycardia lasting 18 beats is the longest.  7 episodes of ventricular tachycardia with longest lasting 23 beats with an average heart rate 113-140.  Fastest at 156.  Low ectopy burden.     Was seen with Dr. Malave in follow-up on 2023.  Recommended nuclear stress test to assess for any ischemia causing arrhythmias.  Ongoing amiodarone for 200 mg daily after 2 months of twice daily therapy  Patient was seen in July in follow-up.  Recommended getting a stress test.-done in [place]     Patient underwent heart catheterization 2023 that showed normal LV size and function with patent bypass grafts.  No intervention.  Recommended ongoing medical

## 2024-10-07 NOTE — TELEPHONE ENCOUNTER
Called and lvm with patient's wife to cancel 10/7 appt and make an appt with Dr Cope in February per Maya's last office note unless patient is having problems or concerns.

## 2024-10-07 NOTE — TELEPHONE ENCOUNTER
Please contact patient to schedule loop recorder for Dr. Malave.  Wife asked that you call her to schedule.  They are traveling 10/22/2024 through 10/25/2024.

## 2024-10-14 DIAGNOSIS — G47.52 REM SLEEP BEHAVIOR DISORDER: ICD-10-CM

## 2024-10-14 NOTE — TELEPHONE ENCOUNTER
Requested Prescriptions     Pending Prescriptions Disp Refills    clonazePAM (KLONOPIN) 0.5 MG tablet [Pharmacy Med Name: clonazePAM 0.5 MG TABLET] 60 tablet      Sig: TAKE TWO TABLETS BY MOUTH EVERY NIGHT AT BEDTIME. MAX DAILY AMOUNT: 2 TABLETS.       Last Office Visit:  7/9/2024  Next Office Visit:  1/7/2025  Last Medication Refill:  4/1/2024 with 5 THAIS Auguste up to date:  10/14/2024    *RX updated to reflect   10/14/2024  fill date*

## 2024-10-15 ENCOUNTER — HOSPITAL ENCOUNTER (OUTPATIENT)
Age: 79
Setting detail: OUTPATIENT SURGERY
Discharge: HOME | End: 2024-10-15
Attending: INTERNAL MEDICINE | Admitting: INTERNAL MEDICINE
Payer: MEDICARE

## 2024-10-15 VITALS
WEIGHT: 195 LBS | BODY MASS INDEX: 30.61 KG/M2 | HEART RATE: 66 BPM | HEIGHT: 67 IN | DIASTOLIC BLOOD PRESSURE: 63 MMHG | OXYGEN SATURATION: 95 % | TEMPERATURE: 96.8 F | RESPIRATION RATE: 19 BRPM | SYSTOLIC BLOOD PRESSURE: 124 MMHG

## 2024-10-15 DIAGNOSIS — R42 DIZZINESS: ICD-10-CM

## 2024-10-15 DIAGNOSIS — R55 NEAR SYNCOPE: ICD-10-CM

## 2024-10-15 LAB — ECHO BSA: 2.04 M2

## 2024-10-15 PROCEDURE — 7100000011 HC PHASE II RECOVERY - ADDTL 15 MIN: Performed by: INTERNAL MEDICINE

## 2024-10-15 PROCEDURE — 2580000003 HC RX 258: Performed by: INTERNAL MEDICINE

## 2024-10-15 PROCEDURE — 33285 INSJ SUBQ CAR RHYTHM MNTR: CPT | Performed by: INTERNAL MEDICINE

## 2024-10-15 PROCEDURE — 6370000000 HC RX 637 (ALT 250 FOR IP): Performed by: INTERNAL MEDICINE

## 2024-10-15 PROCEDURE — C1764 EVENT RECORDER, CARDIAC: HCPCS | Performed by: INTERNAL MEDICINE

## 2024-10-15 PROCEDURE — 7100000010 HC PHASE II RECOVERY - FIRST 15 MIN: Performed by: INTERNAL MEDICINE

## 2024-10-15 PROCEDURE — 2500000003 HC RX 250 WO HCPCS: Performed by: INTERNAL MEDICINE

## 2024-10-15 DEVICE — ICM LNQ22 LINQ II PRIME US
Type: IMPLANTABLE DEVICE | Status: FUNCTIONAL
Brand: LINQ II™

## 2024-10-15 RX ORDER — SODIUM CHLORIDE 9 MG/ML
INJECTION, SOLUTION INTRAVENOUS PRN
Status: DISCONTINUED | OUTPATIENT
Start: 2024-10-15 | End: 2024-10-15 | Stop reason: HOSPADM

## 2024-10-15 RX ORDER — CLONAZEPAM 0.5 MG/1
TABLET ORAL
Qty: 60 TABLET | Refills: 5 | Status: SHIPPED | OUTPATIENT
Start: 2024-10-15 | End: 2025-04-16

## 2024-10-15 RX ORDER — SODIUM CHLORIDE 0.9 % (FLUSH) 0.9 %
5-40 SYRINGE (ML) INJECTION PRN
Status: DISCONTINUED | OUTPATIENT
Start: 2024-10-15 | End: 2024-10-15 | Stop reason: HOSPADM

## 2024-10-15 RX ORDER — SODIUM CHLORIDE 0.9 % (FLUSH) 0.9 %
5-40 SYRINGE (ML) INJECTION EVERY 12 HOURS SCHEDULED
Status: DISCONTINUED | OUTPATIENT
Start: 2024-10-15 | End: 2024-10-15 | Stop reason: HOSPADM

## 2024-10-15 RX ORDER — SODIUM CHLORIDE 9 MG/ML
INJECTION, SOLUTION INTRAVENOUS CONTINUOUS
Status: DISCONTINUED | OUTPATIENT
Start: 2024-10-15 | End: 2024-10-15 | Stop reason: HOSPADM

## 2024-10-15 RX ORDER — CHLORHEXIDINE GLUCONATE 40 MG/ML
SOLUTION TOPICAL ONCE
Status: COMPLETED | OUTPATIENT
Start: 2024-10-15 | End: 2024-10-15

## 2024-10-15 RX ORDER — LIDOCAINE HYDROCHLORIDE AND EPINEPHRINE BITARTRATE 20; .01 MG/ML; MG/ML
INJECTION, SOLUTION SUBCUTANEOUS PRN
Status: DISCONTINUED | OUTPATIENT
Start: 2024-10-15 | End: 2024-10-15 | Stop reason: HOSPADM

## 2024-10-15 RX ADMIN — SODIUM CHLORIDE: 9 INJECTION, SOLUTION INTRAVENOUS at 08:33

## 2024-10-15 RX ADMIN — CHLORHEXIDINE GLUCONATE 118 ML: 4 SOLUTION TOPICAL at 08:34

## 2024-10-15 NOTE — DISCHARGE INSTRUCTIONS
Recovery is within a short period of time.  All activity can be resumed , once most of the discomfort is gone from the incision site.  Usually discomfort at the incision site lats for 1-2 weeks.  Tylenol is a safe medication to take for this discomfort,  Unless you have an allergy to this drug.      INCISION SITE  1.  Leave dressing in place until follow up appointment  2.  Use only antibacterial soap and water to clean your incision.  Do not use any ointments on your incision, unless directed by your cardiologist.  3.  If topical skin adhesive (or Dermabond) is used to close your incision, you may shower or bathe as usual. Gently blot your skin dry with a soft towel.  The skin adhesive will gradually slough  (fall) off from  Your skin within 10-14 days.  4.  When steri-strips (small band-aides) are used, keep the incision clean and dry. Do not soak the wound until the steri-strips have fallen off, which should be withn 10-14 days.  5.  Check the incision site.  If you notice signs of infection, such as increased soreness, redness or discharge, contact your cardiologist immediately.

## 2024-10-15 NOTE — PROGRESS NOTES
Discharge instructions reviewed with patient and patient states understanding. ILR site c/d/I. Patient discharged from cath holding with all belongings and AVS.  Electronically signed by Andreina Salinas RN on 10/15/2024 at 10:31 AM

## 2024-10-15 NOTE — H&P
------------------------------------------------   Electronically signed by Troy Malave MD(Performing   Physician) on 08/29/2023 12:20     Reviewed EKG dated 8/22/2024 that shows sinus rhythm     Assessment:      Diagnosis Orders   1. Near syncope          2. Dizziness          3. Coronary artery disease involving native coronary artery of native heart without angina pectoris          4. Chronic systolic (congestive) heart failure          5. Orthostatic hypotension          6. Ventricular tachycardia (paroxysmal) (HCC)          7. On amiodarone therapy          8. Parkinson's disease, unspecified whether dyskinesia present, unspecified whether manifestations fluctuate (HCC)                Near syncope/dizziness-ongoing for the past year.  Recent Zio patch heart monitor showed no significant arrhythmias, pauses or heart block.  He did have some history of NSVT in the past year and has been on amiodarone since this time.  His symptoms are intermittent and have been difficult to diagnose.  He had an episode of near syncope and dizziness the day after he removed his heart monitor.  Recommend placing a loop recorder for longer-term monitoring to rule out significant arrhythmias as a cause of his symptoms.  He does also have Parkinson's which can cause some autonomic issues.     CAD-stable without angina.  Reviewed heart cath from 8/23.     Chronic systolic heart failure-history of, stable and euvolemic with normal LVEF per heart cath 8/23     NSVT on amiodarone therapy-no recurrence per recent heart monitor.  Intermittent dizziness and near syncope.  Will place a loop recorder for longer-term monitoring     Orthostatic hypotension-patient does have a 20 point drop in blood pressure upon standing which could be contributing to his dizziness.  Recommend better hydration with at least 8 cups of fluid daily, changing positions slowly.  Currently on no antihypertensives     Parkinson's-as noted above could be causing some

## 2024-10-21 ENCOUNTER — NURSE ONLY (OUTPATIENT)
Dept: CARDIOLOGY CLINIC | Age: 79
End: 2024-10-21

## 2024-10-21 DIAGNOSIS — Z51.89 VISIT FOR WOUND CHECK: Primary | ICD-10-CM

## 2024-10-21 NOTE — PROGRESS NOTES
Patient here for a wound check visit status post loop recorder implant.  Incision dry and intact.  No redness, swelling, or drainage noted  Edges well approximated  Instructed patient to wash area with antibacterial soap and keep it clean and dry.  Reviewed discharged instructions and questions answered.  Reviewed [employer] home monitor and patient verbalized understanding.   Patients wife has the dion on her phone.  She is not always around patient, but she is with him at night.  She keeps the dion open and checks on it periodically to make sure it is active and working.  Patient used his symptom marker yesterday due to dizziness.  No arrhythmias noted.  Patient's wife stated she checked his BP and it was ok.  Follow up as scheduled

## 2024-10-24 DIAGNOSIS — R55 SYNCOPE AND COLLAPSE: ICD-10-CM

## 2024-10-24 DIAGNOSIS — Z95.818 STATUS POST PLACEMENT OF IMPLANTABLE LOOP RECORDER: Primary | ICD-10-CM

## 2024-10-30 ENCOUNTER — TELEPHONE (OUTPATIENT)
Dept: CARDIOLOGY CLINIC | Age: 79
End: 2024-10-30

## 2024-10-30 NOTE — TELEPHONE ENCOUNTER
Patients wife called and stated patient told her today he had some chest pain yesterday and she wanted to know if anything showed up on his loop recorder.  Reviewed loop recorder and there are no new episodes.  Told her chest pain usually does not show up on a loop recorder.  Patient has a scheduled OV on 11/11/24.

## 2024-11-11 ENCOUNTER — OFFICE VISIT (OUTPATIENT)
Dept: CARDIOLOGY CLINIC | Age: 79
End: 2024-11-11

## 2024-11-11 VITALS
WEIGHT: 199 LBS | HEIGHT: 66 IN | HEART RATE: 60 BPM | BODY MASS INDEX: 31.98 KG/M2 | OXYGEN SATURATION: 96 % | SYSTOLIC BLOOD PRESSURE: 140 MMHG | DIASTOLIC BLOOD PRESSURE: 70 MMHG

## 2024-11-11 DIAGNOSIS — Z95.818 STATUS POST PLACEMENT OF IMPLANTABLE LOOP RECORDER: Primary | ICD-10-CM

## 2024-11-11 DIAGNOSIS — G20.A1 PARKINSON'S DISEASE, UNSPECIFIED WHETHER DYSKINESIA PRESENT, UNSPECIFIED WHETHER MANIFESTATIONS FLUCTUATE (HCC): ICD-10-CM

## 2024-11-11 DIAGNOSIS — I25.10 CORONARY ARTERY DISEASE INVOLVING NATIVE CORONARY ARTERY OF NATIVE HEART WITHOUT ANGINA PECTORIS: ICD-10-CM

## 2024-11-11 DIAGNOSIS — Z79.899 ON AMIODARONE THERAPY: ICD-10-CM

## 2024-11-11 DIAGNOSIS — R42 DIZZINESS: ICD-10-CM

## 2024-11-11 DIAGNOSIS — Z95.818 STATUS POST PLACEMENT OF IMPLANTABLE LOOP RECORDER: ICD-10-CM

## 2024-11-11 DIAGNOSIS — I47.29 VENTRICULAR TACHYCARDIA (PAROXYSMAL) (HCC): ICD-10-CM

## 2024-11-11 DIAGNOSIS — R55 NEAR SYNCOPE: ICD-10-CM

## 2024-11-11 DIAGNOSIS — R00.2 PALPITATION: ICD-10-CM

## 2024-11-11 DIAGNOSIS — R07.89 ATYPICAL CHEST PAIN: ICD-10-CM

## 2024-11-11 DIAGNOSIS — R55 NEAR SYNCOPE: Primary | ICD-10-CM

## 2024-11-11 ASSESSMENT — ENCOUNTER SYMPTOMS
COUGH: 0
ABDOMINAL PAIN: 0
NAUSEA: 0
CHEST TIGHTNESS: 0
FACIAL SWELLING: 0
VOMITING: 0
WHEEZING: 0
SHORTNESS OF BREATH: 0
EYE REDNESS: 0

## 2024-11-11 NOTE — PROGRESS NOTES
[facility] Cardiology  [address] [address]  Phone: [phone]  Fax: [phone]    OFFICE VISIT:  2024    Oli Leal - : 1945    Reason For Visit:  Oli is a 79 y.o. male who is here for Follow-up (Patient states he is having ongoing chest pain, dizziness, and fatigue.) and Near syncope       Diagnosis Orders   1. Near syncope        2. Dizziness        3. Atypical chest pain        4. Coronary artery disease involving native coronary artery of native heart without angina pectoris        5. Ventricular tachycardia (paroxysmal) (HCC)        6. On amiodarone therapy        7. Parkinson's disease, unspecified whether dyskinesia present, unspecified whether manifestations fluctuate (HCC)        8. Status post placement of implantable loop recorder                HPI  History of coronary disease with previous bypass, Parkinson's disease, ANGE, hypertension hyperlipidemia.  Was hospitalized with ventricular tachycardia at [facility].  He was treated with amiodarone.  At that time echo showed preserved LV systolic function with mild diastolic dysfunction and mild MR.  No other significant findings  Monitor worn for a week postdischarge showed normal sinus rhythm.  15 episodes of atrial tachycardia lasting 18 beats is the longest.  7 episodes of ventricular tachycardia with longest lasting 23 beats with an average heart rate 113-140.  Fastest at 156.  Low ectopy burden.     Was seen with Dr. Malave in follow-up on 2023.  Recommended nuclear stress test to assess for any ischemia causing arrhythmias.  Ongoing amiodarone for 200 mg daily after 2 months of twice daily therapy  Patient was seen in July in follow-up.  Recommended getting a stress test.-done in [place]     Patient underwent heart catheterization 2023 that showed normal LV size and function with patent bypass grafts.  No intervention.  Recommended ongoing medical management

## 2024-11-11 NOTE — PATIENT INSTRUCTIONS
Return in about 3 months (around 2/11/2025) for APRN.  Contine better fluid intake- 8 cups of water daily  Call for exertional chest pain  Use symptom marker for any passing out episodes

## 2024-12-06 RX ORDER — AMIODARONE HYDROCHLORIDE 200 MG/1
200 TABLET ORAL DAILY
Qty: 30 TABLET | Refills: 5 | Status: SHIPPED | OUTPATIENT
Start: 2024-12-06

## 2024-12-12 DIAGNOSIS — I47.29 VENTRICULAR TACHYCARDIA (PAROXYSMAL) (HCC): ICD-10-CM

## 2024-12-12 DIAGNOSIS — Z95.818 STATUS POST PLACEMENT OF IMPLANTABLE LOOP RECORDER: Primary | ICD-10-CM

## 2024-12-12 DIAGNOSIS — I50.22 CHRONIC SYSTOLIC (CONGESTIVE) HEART FAILURE (HCC): ICD-10-CM

## 2024-12-12 PROCEDURE — 93298 REM INTERROG DEV EVAL SCRMS: CPT | Performed by: CLINICAL NURSE SPECIALIST

## 2024-12-26 ENCOUNTER — TELEPHONE (OUTPATIENT)
Dept: CARDIOLOGY CLINIC | Age: 79
End: 2024-12-26

## 2024-12-26 NOTE — TELEPHONE ENCOUNTER
Patient's wife called and left voice message stating patients loop monitor dion is not connecting.  Returned  call and left her a voice message to check to make sure bluetooth was on and the dion is open running in the background.  If that does not work, then will need to please call [employer] Get connected at [phone] to discuss phone dion issues.

## 2025-01-22 DIAGNOSIS — R42 DIZZINESS: ICD-10-CM

## 2025-01-22 DIAGNOSIS — Z95.818 STATUS POST PLACEMENT OF IMPLANTABLE LOOP RECORDER: Primary | ICD-10-CM

## 2025-01-22 DIAGNOSIS — R55 NEAR SYNCOPE: ICD-10-CM

## 2025-01-22 PROCEDURE — 93298 REM INTERROG DEV EVAL SCRMS: CPT | Performed by: CLINICAL NURSE SPECIALIST

## 2025-02-24 ENCOUNTER — OFFICE VISIT (OUTPATIENT)
Age: 80
End: 2025-02-24
Payer: MEDICARE

## 2025-02-24 VITALS
DIASTOLIC BLOOD PRESSURE: 70 MMHG | HEIGHT: 66 IN | BODY MASS INDEX: 32.14 KG/M2 | HEART RATE: 61 BPM | WEIGHT: 200 LBS | SYSTOLIC BLOOD PRESSURE: 130 MMHG

## 2025-02-24 DIAGNOSIS — R41.3 MEMORY LOSS: ICD-10-CM

## 2025-02-24 DIAGNOSIS — I47.29 VENTRICULAR TACHYCARDIA (PAROXYSMAL) (HCC): ICD-10-CM

## 2025-02-24 DIAGNOSIS — Z95.818 STATUS POST PLACEMENT OF IMPLANTABLE LOOP RECORDER: ICD-10-CM

## 2025-02-24 DIAGNOSIS — Z79.899 ON AMIODARONE THERAPY: ICD-10-CM

## 2025-02-24 DIAGNOSIS — R55 NEAR SYNCOPE: Primary | ICD-10-CM

## 2025-02-24 DIAGNOSIS — R07.9 CHEST PAIN, UNSPECIFIED TYPE: ICD-10-CM

## 2025-02-24 DIAGNOSIS — G20.A1 PARKINSON'S DISEASE, UNSPECIFIED WHETHER DYSKINESIA PRESENT, UNSPECIFIED WHETHER MANIFESTATIONS FLUCTUATE (HCC): ICD-10-CM

## 2025-02-24 DIAGNOSIS — I25.10 CORONARY ARTERY DISEASE INVOLVING NATIVE CORONARY ARTERY OF NATIVE HEART WITHOUT ANGINA PECTORIS: ICD-10-CM

## 2025-02-24 DIAGNOSIS — I50.22 CHRONIC SYSTOLIC (CONGESTIVE) HEART FAILURE (HCC): ICD-10-CM

## 2025-02-24 DIAGNOSIS — R42 DIZZINESS: ICD-10-CM

## 2025-02-24 LAB
ALBUMIN SERPL-MCNC: 3.9 G/DL (ref 3.5–5.2)
ALP SERPL-CCNC: 91 U/L (ref 40–129)
ALT SERPL-CCNC: 21 U/L (ref 5–41)
ANION GAP SERPL CALCULATED.3IONS-SCNC: 11 MMOL/L (ref 8–16)
AST SERPL-CCNC: 19 U/L (ref 5–40)
BILIRUB SERPL-MCNC: 0.5 MG/DL (ref 0.2–1.2)
BUN SERPL-MCNC: 19 MG/DL (ref 8–23)
CALCIUM SERPL-MCNC: 8.9 MG/DL (ref 8.8–10.2)
CHLORIDE SERPL-SCNC: 102 MMOL/L (ref 98–107)
CO2 SERPL-SCNC: 25 MMOL/L (ref 22–29)
CREAT SERPL-MCNC: 1.2 MG/DL (ref 0.7–1.2)
GLUCOSE SERPL-MCNC: 89 MG/DL (ref 70–99)
POTASSIUM SERPL-SCNC: 4.6 MMOL/L (ref 3.5–5.1)
PROT SERPL-MCNC: 6.5 G/DL (ref 6.4–8.3)
SODIUM SERPL-SCNC: 138 MMOL/L (ref 136–145)
TSH SERPL DL<=0.005 MIU/L-ACNC: 2.17 UIU/ML (ref 0.27–4.2)

## 2025-02-24 PROCEDURE — 1159F MED LIST DOCD IN RCRD: CPT | Performed by: CLINICAL NURSE SPECIALIST

## 2025-02-24 PROCEDURE — 93285 PRGRMG DEV EVAL SCRMS IP: CPT | Performed by: CLINICAL NURSE SPECIALIST

## 2025-02-24 PROCEDURE — 99214 OFFICE O/P EST MOD 30 MIN: CPT | Performed by: CLINICAL NURSE SPECIALIST

## 2025-02-24 PROCEDURE — G8427 DOCREV CUR MEDS BY ELIG CLIN: HCPCS | Performed by: CLINICAL NURSE SPECIALIST

## 2025-02-24 PROCEDURE — G8417 CALC BMI ABV UP PARAM F/U: HCPCS | Performed by: CLINICAL NURSE SPECIALIST

## 2025-02-24 PROCEDURE — 93005 ELECTROCARDIOGRAM TRACING: CPT | Performed by: CLINICAL NURSE SPECIALIST

## 2025-02-24 PROCEDURE — 1124F ACP DISCUSS-NO DSCNMKR DOCD: CPT | Performed by: CLINICAL NURSE SPECIALIST

## 2025-02-24 PROCEDURE — 93010 ELECTROCARDIOGRAM REPORT: CPT | Performed by: CLINICAL NURSE SPECIALIST

## 2025-02-24 ASSESSMENT — ENCOUNTER SYMPTOMS
ABDOMINAL PAIN: 0
VOMITING: 0
WHEEZING: 0
FACIAL SWELLING: 0
COUGH: 0
NAUSEA: 0
EYE REDNESS: 0
CHEST TIGHTNESS: 0
SHORTNESS OF BREATH: 0

## 2025-02-24 NOTE — PROGRESS NOTES
[facility] Cardiology  [address] [address]  Phone: [phone]  Fax: [phone]    OFFICE VISIT:  2025    Oli Leal - : 1945    Reason For Visit:  Oli is a 79 y.o. male who is here for Follow-up (Patient is having chest pains and dizziness) and Near syncope       Diagnosis Orders   1. Near syncope  EKG 12 lead      2. Chronic systolic (congestive) heart failure  EKG 12 lead      3. On amiodarone therapy  Comprehensive Metabolic Panel    TSH      4. Chest pain, unspecified type  Nuclear stress test with myocardial perfusion      5. Dizziness        6. Memory loss        7. Parkinson's disease, unspecified whether dyskinesia present, unspecified whether manifestations fluctuate (HCC)        8. Ventricular tachycardia (paroxysmal) ([facility])        9. Coronary artery disease involving native coronary artery of native heart without angina pectoris        10. Status post placement of implantable loop recorder                HPI  History of coronary disease with previous bypass, Parkinson's disease, ANGE, hypertension hyperlipidemia.  Was hospitalized with ventricular tachycardia at [facility].  He was treated with amiodarone.  At that time echo showed preserved LV systolic function with mild diastolic dysfunction and mild MR.  No other significant findings  Monitor worn for a week postdischarge showed normal sinus rhythm.  15 episodes of atrial tachycardia lasting 18 beats is the longest.  7 episodes of ventricular tachycardia with longest lasting 23 beats with an average heart rate 113-140.  Fastest at 156.  Low ectopy burden.     Was seen with Dr. Malave in follow-up on 2023.  Recommended nuclear stress test to assess for any ischemia causing arrhythmias.  Ongoing amiodarone for 200 mg daily after 2 months of twice daily therapy     Patient underwent heart catheterization 2023 that showed normal LV size and function with patent bypass

## 2025-02-24 NOTE — PATIENT INSTRUCTIONS
Return in about 6 months (around 8/24/2025) for Dr. Malave.  Upower  Labs for monitoring amiodarone  Contine better fluid intake- 8 cups of water daily  Call for exertional chest pain  Use symptom marker for any passing out episodes  Advance Care Planning     Advance Care Planning opens a door to talk about and write down your wishes before a sudden accident or illness.  Make your goals, values, and preferences known.     This puts you in the ’s seat and helps others know what matters most to you so they won’t have to guess.      Where can you learn more?    Go to [email or web address]   to learn how to:    Name someone you trust to make healthcare decisions for you, only if you can’t. (Healthcare Power of )    Document your wishes for care if you were seriously ill and not expected to recover or are approaching end of life. (Advance Directive or Living Will)    The same page can be found using the QR code below.

## 2025-02-27 DIAGNOSIS — Z95.818 STATUS POST PLACEMENT OF IMPLANTABLE LOOP RECORDER: Primary | ICD-10-CM

## 2025-02-27 DIAGNOSIS — R55 NEAR SYNCOPE: ICD-10-CM

## 2025-02-27 DIAGNOSIS — I10 ESSENTIAL HYPERTENSION: ICD-10-CM

## 2025-03-03 DIAGNOSIS — F02.B0: Primary | ICD-10-CM

## 2025-03-03 DIAGNOSIS — G31.01: Primary | ICD-10-CM

## 2025-03-03 RX ORDER — MEMANTINE HYDROCHLORIDE 10 MG/1
10 TABLET ORAL 2 TIMES DAILY
Qty: 180 TABLET | Refills: 3 | Status: SHIPPED | OUTPATIENT
Start: 2025-03-03

## 2025-03-03 NOTE — TELEPHONE ENCOUNTER
Requested Prescriptions     Pending Prescriptions Disp Refills    memantine (NAMENDA) 10 MG tablet [Pharmacy Med Name: MEMANTINE HCL 10 MG TABLET] 180 tablet      Sig: TAKE 1 TABLET BY MOUTH 2 TIMES A DAY       Last Office Visit: 7/9/2024  Next Office Visit: 5/5/2025  Last Medication Refill:12/6/2024

## 2025-04-02 ENCOUNTER — RESULTS FOLLOW-UP (OUTPATIENT)
Dept: CARDIOLOGY CLINIC | Age: 80
End: 2025-04-02

## 2025-04-02 DIAGNOSIS — Z95.818 STATUS POST PLACEMENT OF IMPLANTABLE LOOP RECORDER: Primary | ICD-10-CM

## 2025-04-02 DIAGNOSIS — R00.2 PALPITATION: ICD-10-CM

## 2025-04-02 DIAGNOSIS — R42 DIZZINESS: ICD-10-CM

## 2025-04-02 DIAGNOSIS — R55 NEAR SYNCOPE: ICD-10-CM

## 2025-04-02 DIAGNOSIS — R55 PRE-SYNCOPE: ICD-10-CM

## 2025-04-02 PROCEDURE — 93298 REM INTERROG DEV EVAL SCRMS: CPT | Performed by: CLINICAL NURSE SPECIALIST

## 2025-04-23 ENCOUNTER — HOSPITAL ENCOUNTER (OUTPATIENT)
Dept: NUCLEAR MEDICINE | Age: 80
Discharge: HOME | End: 2025-04-25
Payer: MEDICARE

## 2025-04-23 DIAGNOSIS — R07.9 CHEST PAIN, UNSPECIFIED TYPE: ICD-10-CM

## 2025-04-23 LAB
NUC STRESS EJECTION FRACTION: 50 %
STRESS BASELINE DIAS BP: 72 MMHG
STRESS BASELINE HR: 55 BPM
STRESS BASELINE SYS BP: 124 MMHG
STRESS ESTIMATED WORKLOAD: 1 METS
STRESS EXERCISE DUR MIN: 5 MIN
STRESS EXERCISE DUR SEC: 0 SEC
STRESS PEAK DIAS BP: 72 MMHG
STRESS PEAK SYS BP: 136 MMHG
STRESS PERCENT HR ACHIEVED: 49 %
STRESS POST PEAK HR: 68 BPM
STRESS RATE PRESSURE PRODUCT: 9248 BPM*MMHG
STRESS TARGET HR: 140 BPM

## 2025-04-23 PROCEDURE — 3430000000 HC RX DIAGNOSTIC RADIOPHARMACEUTICAL: Performed by: CLINICAL NURSE SPECIALIST

## 2025-04-23 PROCEDURE — 6360000002 HC RX W HCPCS: Performed by: CLINICAL NURSE SPECIALIST

## 2025-04-23 PROCEDURE — A9502 TC99M TETROFOSMIN: HCPCS | Performed by: CLINICAL NURSE SPECIALIST

## 2025-04-23 PROCEDURE — 93017 CV STRESS TEST TRACING ONLY: CPT

## 2025-04-23 RX ORDER — REGADENOSON 0.08 MG/ML
0.4 INJECTION, SOLUTION INTRAVENOUS
Status: COMPLETED | OUTPATIENT
Start: 2025-04-23 | End: 2025-04-23

## 2025-04-23 RX ADMIN — TETROFOSMIN 24 MILLICURIE: 0.23 INJECTION, POWDER, LYOPHILIZED, FOR SOLUTION INTRAVENOUS at 11:03

## 2025-04-23 RX ADMIN — TETROFOSMIN 8 MILLICURIE: 0.23 INJECTION, POWDER, LYOPHILIZED, FOR SOLUTION INTRAVENOUS at 11:03

## 2025-04-23 RX ADMIN — REGADENOSON 0.4 MG: 0.08 INJECTION, SOLUTION INTRAVENOUS at 10:08

## 2025-04-24 ENCOUNTER — RESULTS FOLLOW-UP (OUTPATIENT)
Dept: CARDIOLOGY CLINIC | Age: 80
End: 2025-04-24

## 2025-05-08 ENCOUNTER — RESULTS FOLLOW-UP (OUTPATIENT)
Dept: CARDIOLOGY CLINIC | Age: 80
End: 2025-05-08

## 2025-05-08 DIAGNOSIS — Z95.818 STATUS POST PLACEMENT OF IMPLANTABLE LOOP RECORDER: Primary | ICD-10-CM

## 2025-05-08 DIAGNOSIS — R55 NEAR SYNCOPE: ICD-10-CM

## 2025-05-08 DIAGNOSIS — I10 ESSENTIAL HYPERTENSION: ICD-10-CM

## 2025-05-08 PROCEDURE — 93298 REM INTERROG DEV EVAL SCRMS: CPT | Performed by: CLINICAL NURSE SPECIALIST

## 2025-05-27 ENCOUNTER — OFFICE VISIT (OUTPATIENT)
Dept: CARDIOLOGY CLINIC | Age: 80
End: 2025-05-27
Payer: MEDICARE

## 2025-05-27 VITALS
SYSTOLIC BLOOD PRESSURE: 128 MMHG | OXYGEN SATURATION: 95 % | HEIGHT: 67 IN | WEIGHT: 199 LBS | DIASTOLIC BLOOD PRESSURE: 68 MMHG | BODY MASS INDEX: 31.23 KG/M2 | HEART RATE: 63 BPM

## 2025-05-27 DIAGNOSIS — I25.10 CORONARY ARTERY DISEASE INVOLVING NATIVE CORONARY ARTERY OF NATIVE HEART WITHOUT ANGINA PECTORIS: Primary | ICD-10-CM

## 2025-05-27 DIAGNOSIS — R55 NEAR SYNCOPE: ICD-10-CM

## 2025-05-27 DIAGNOSIS — I50.22 CHRONIC SYSTOLIC (CONGESTIVE) HEART FAILURE (HCC): ICD-10-CM

## 2025-05-27 DIAGNOSIS — G20.A1 PARKINSON'S DISEASE, UNSPECIFIED WHETHER DYSKINESIA PRESENT, UNSPECIFIED WHETHER MANIFESTATIONS FLUCTUATE (HCC): ICD-10-CM

## 2025-05-27 DIAGNOSIS — Z95.818 STATUS POST PLACEMENT OF IMPLANTABLE LOOP RECORDER: ICD-10-CM

## 2025-05-27 DIAGNOSIS — I47.20 VENTRICULAR TACHYCARDIA (PAROXYSMAL) (HCC): ICD-10-CM

## 2025-05-27 DIAGNOSIS — R42 DIZZINESS: ICD-10-CM

## 2025-05-27 DIAGNOSIS — Z79.899 ON AMIODARONE THERAPY: ICD-10-CM

## 2025-05-27 PROCEDURE — 1124F ACP DISCUSS-NO DSCNMKR DOCD: CPT | Performed by: CLINICAL NURSE SPECIALIST

## 2025-05-27 PROCEDURE — G8417 CALC BMI ABV UP PARAM F/U: HCPCS | Performed by: CLINICAL NURSE SPECIALIST

## 2025-05-27 PROCEDURE — G8427 DOCREV CUR MEDS BY ELIG CLIN: HCPCS | Performed by: CLINICAL NURSE SPECIALIST

## 2025-05-27 PROCEDURE — 1036F TOBACCO NON-USER: CPT | Performed by: CLINICAL NURSE SPECIALIST

## 2025-05-27 PROCEDURE — 93285 PRGRMG DEV EVAL SCRMS IP: CPT | Performed by: CLINICAL NURSE SPECIALIST

## 2025-05-27 PROCEDURE — 99214 OFFICE O/P EST MOD 30 MIN: CPT | Performed by: CLINICAL NURSE SPECIALIST

## 2025-05-27 PROCEDURE — 1159F MED LIST DOCD IN RCRD: CPT | Performed by: CLINICAL NURSE SPECIALIST

## 2025-05-27 ASSESSMENT — ENCOUNTER SYMPTOMS
CHEST TIGHTNESS: 0
ABDOMINAL PAIN: 0
EYE REDNESS: 0
WHEEZING: 0
FACIAL SWELLING: 0
VOMITING: 0
COUGH: 0
SHORTNESS OF BREATH: 0
NAUSEA: 0

## 2025-05-27 NOTE — PROGRESS NOTES
[facility] Cardiology  [address] [address]  Phone: [phone]  Fax: [phone]    OFFICE VISIT:  2025    Oli Leal - : 1945    Reason For Visit:  Oli is a 80 y.o. male who is here for Follow-up (Patient has extreme fatigue) and Near syncope       Diagnosis Orders   1. Coronary artery disease involving native coronary artery of native heart without angina pectoris        2. Dizziness        3. Near syncope        4. Ventricular tachycardia (paroxysmal) (HCC)        5. On amiodarone therapy        6. Chronic systolic (congestive) heart failure        7. Status post placement of implantable loop recorder        8. Parkinson's disease, unspecified whether dyskinesia present, unspecified whether manifestations fluctuate (HCC)                  HPI  History of coronary disease with previous bypass, Parkinson's disease, ANGE, hypertension hyperlipidemia.  Was hospitalized  with ventricular tachycardia at [facility].  He was treated with amiodarone.  At that time echo showed preserved LV systolic function with mild diastolic dysfunction and mild MR.       Patient underwent heart catheterization 2023 that showed normal LV size and function with patent bypass grafts.  No intervention.  Recommended ongoing medical management     Patient has had ongoing problems with near syncope/dizziness.  A loop recorder recently on 10/15/2024.  He has been encouraged to stay better hydrated and is trying to do so.  He has had no further syncopal episodes since the  loop recorder, but does still have some occasional positional dizziness.   He has known Parkinson's disease    Concerns about intermittent chest pain , brief and rarely exertional in nature .  Patient underwent Lexiscan  with no evidence of ischemia.  He reports at today's visit that he is having no further issues with chest pain    Timo Cordoba MD is PCP.  Oli Leal has the

## 2025-06-03 DIAGNOSIS — G47.52 REM SLEEP BEHAVIOR DISORDER: ICD-10-CM

## 2025-06-04 RX ORDER — CLONAZEPAM 0.5 MG/1
TABLET ORAL
Qty: 60 TABLET | Refills: 5 | Status: SHIPPED | OUTPATIENT
Start: 2025-06-04 | End: 2025-11-04

## 2025-06-04 NOTE — TELEPHONE ENCOUNTER
Requested Prescriptions     Pending Prescriptions Disp Refills    clonazePAM (KLONOPIN) 0.5 MG tablet [Pharmacy Med Name: clonazePAM 0.5 MG TABLET] 60 tablet      Sig: TAKE 2 TABLET BY MOUTH EVERY NIGHT AT BEDTIME. MAX DAILY AMOUNT 2 TABLETS       Last Office Visit:  5/5/2025  Next Office Visit:  11/18/2025  Last Medication Refill:  3/27/2025  [place] up to date:  up to date    *RX updated to reflect   6/4/2025  fill date*

## 2025-06-09 RX ORDER — AMIODARONE HYDROCHLORIDE 200 MG/1
200 TABLET ORAL DAILY
Qty: 90 TABLET | Refills: 3 | Status: SHIPPED | OUTPATIENT
Start: 2025-06-09

## 2025-06-30 DIAGNOSIS — R42 DIZZINESS: ICD-10-CM

## 2025-06-30 DIAGNOSIS — R55 NEAR SYNCOPE: ICD-10-CM

## 2025-06-30 DIAGNOSIS — Z95.818 STATUS POST PLACEMENT OF IMPLANTABLE LOOP RECORDER: Primary | ICD-10-CM

## 2025-06-30 PROCEDURE — 93298 REM INTERROG DEV EVAL SCRMS: CPT | Performed by: CLINICAL NURSE SPECIALIST

## 2025-07-01 ENCOUNTER — RESULTS FOLLOW-UP (OUTPATIENT)
Dept: CARDIOLOGY CLINIC | Age: 80
End: 2025-07-01

## 2025-08-06 DIAGNOSIS — R42 DIZZINESS: ICD-10-CM

## 2025-08-06 DIAGNOSIS — R55 NEAR SYNCOPE: ICD-10-CM

## 2025-08-06 DIAGNOSIS — Z95.818 STATUS POST PLACEMENT OF IMPLANTABLE LOOP RECORDER: Primary | ICD-10-CM

## 2025-08-06 PROCEDURE — 93298 REM INTERROG DEV EVAL SCRMS: CPT | Performed by: CLINICAL NURSE SPECIALIST
